# Patient Record
Sex: FEMALE | Race: WHITE | Employment: OTHER | URBAN - METROPOLITAN AREA
[De-identification: names, ages, dates, MRNs, and addresses within clinical notes are randomized per-mention and may not be internally consistent; named-entity substitution may affect disease eponyms.]

---

## 2020-09-24 ENCOUNTER — OFFICE VISIT (OUTPATIENT)
Dept: NEUROLOGY | Age: 75
End: 2020-09-24
Payer: MEDICARE

## 2020-09-24 ENCOUNTER — HOSPITAL ENCOUNTER (OUTPATIENT)
Dept: GENERAL RADIOLOGY | Age: 75
Discharge: HOME | End: 2020-09-24
Payer: MEDICARE

## 2020-09-24 VITALS
HEART RATE: 56 BPM | OXYGEN SATURATION: 98 % | TEMPERATURE: 98 F | WEIGHT: 123 LBS | SYSTOLIC BLOOD PRESSURE: 138 MMHG | DIASTOLIC BLOOD PRESSURE: 74 MMHG | RESPIRATION RATE: 12 BRPM

## 2020-09-24 DIAGNOSIS — G43.109 MIGRAINE WITH AURA, NOT INTRACTABLE, WITHOUT STATUS MIGRAINOSUS: ICD-10-CM

## 2020-09-24 DIAGNOSIS — M47.22 CERVICAL RADICULOPATHY DUE TO DEGENERATIVE JOINT DISEASE OF SPINE: ICD-10-CM

## 2020-09-24 DIAGNOSIS — G44.86 CERVICOGENIC HEADACHE: Primary | ICD-10-CM

## 2020-09-24 DIAGNOSIS — M48.061 DEGENERATIVE LUMBAR SPINAL STENOSIS: ICD-10-CM

## 2020-09-24 DIAGNOSIS — G44.86 CERVICOGENIC HEADACHE: ICD-10-CM

## 2020-09-24 PROCEDURE — 72052 X-RAY EXAM NECK SPINE 6/>VWS: CPT

## 2020-09-24 PROCEDURE — 1090F PRES/ABSN URINE INCON ASSESS: CPT | Performed by: PSYCHIATRY & NEUROLOGY

## 2020-09-24 PROCEDURE — G8400 PT W/DXA NO RESULTS DOC: HCPCS | Performed by: PSYCHIATRY & NEUROLOGY

## 2020-09-24 PROCEDURE — 1101F PT FALLS ASSESS-DOCD LE1/YR: CPT | Performed by: PSYCHIATRY & NEUROLOGY

## 2020-09-24 PROCEDURE — G8427 DOCREV CUR MEDS BY ELIG CLIN: HCPCS | Performed by: PSYCHIATRY & NEUROLOGY

## 2020-09-24 PROCEDURE — G8536 NO DOC ELDER MAL SCRN: HCPCS | Performed by: PSYCHIATRY & NEUROLOGY

## 2020-09-24 PROCEDURE — G8510 SCR DEP NEG, NO PLAN REQD: HCPCS | Performed by: PSYCHIATRY & NEUROLOGY

## 2020-09-24 PROCEDURE — 99205 OFFICE O/P NEW HI 60 MIN: CPT | Performed by: PSYCHIATRY & NEUROLOGY

## 2020-09-24 PROCEDURE — G8421 BMI NOT CALCULATED: HCPCS | Performed by: PSYCHIATRY & NEUROLOGY

## 2020-09-24 PROCEDURE — 3017F COLORECTAL CA SCREEN DOC REV: CPT | Performed by: PSYCHIATRY & NEUROLOGY

## 2020-09-24 RX ORDER — ACETAMINOPHEN 160 MG/5ML
SUSPENSION, ORAL (FINAL DOSE FORM) ORAL DAILY
COMMUNITY
End: 2021-04-05

## 2020-09-24 RX ORDER — MULTIVIT WITH MINERALS/HERBS
1 TABLET ORAL DAILY
COMMUNITY
End: 2021-04-05

## 2020-09-24 RX ORDER — LANOLIN ALCOHOL/MO/W.PET/CERES
3000 CREAM (GRAM) TOPICAL DAILY
COMMUNITY
End: 2021-04-05

## 2020-09-24 RX ORDER — TIZANIDINE 2 MG/1
2 TABLET ORAL
Qty: 100 TAB | Refills: 5 | Status: SHIPPED | OUTPATIENT
Start: 2020-09-24 | End: 2021-04-05 | Stop reason: SINTOL

## 2020-09-24 RX ORDER — ATORVASTATIN CALCIUM 10 MG/1
TABLET, FILM COATED ORAL EVERY EVENING
COMMUNITY
Start: 2020-06-30

## 2020-09-24 RX ORDER — BUTALBITAL, ACETAMINOPHEN AND CAFFEINE 50; 325; 40 MG/1; MG/1; MG/1
2 TABLET ORAL
Qty: 50 TAB | Refills: 5 | Status: SHIPPED | OUTPATIENT
Start: 2020-09-24 | End: 2021-04-05 | Stop reason: SDUPTHER

## 2020-09-24 RX ORDER — CHOLECALCIFEROL (VITAMIN D3) 125 MCG
CAPSULE ORAL
COMMUNITY
End: 2021-04-05

## 2020-09-24 RX ORDER — DICLOFENAC SODIUM 75 MG/1
75 TABLET, DELAYED RELEASE ORAL 2 TIMES DAILY
COMMUNITY
Start: 2020-07-26

## 2020-09-24 NOTE — LETTER
9/24/20 Patient: Jazmin Muñoz YOB: 1945 Date of Visit: 9/24/2020 Chava Young MD 
[facility] [address] [facility] [address] [place] [address] VIA Facsimile: [phone] Dear Chava Young MD, Thank you for referring Ms. Angelika Sal to [address] [place] [address] for evaluation. My notes for this consultation are attached. Consult REFERRED BY: 
Gerardo Guevara MD 
 
CHIEF COMPLAINT: Severe headaches in the right posterior head regions Subjective:  
 
Jazmin Muñoz is a 77 y.o. right-handed  female who states that in 2017 she fell and hit her head, and ever since then has had bad headaches seem to begin at the base of her skull on the right side and radiate up into the eyebrow area, and she is been diagnosed with right occipital neuralgia, and receiving intermittent injections in that area to try to control her pain. She had an MRI scan of the cervical spine done in 2017 that showed mild degenerative disease and disc disease and neuroforaminal disease of moderate severity throughout the cervical spine but no significant cord compression. She also had an MRI done of the lumbar spine within the last year that shows severe spinal stenosis at L3-4 and L4-5 which causes her pain and discomfort and she has to get injections in her lumbar spine intermittently from her pain management physician Dr. Siva Lizama. Patient also gets chiropractic treatment which also aligned the lower lumbar spine and the cervical spine. She is concerned because she is getting more severe headaches sometimes in the right occipital region that are associated with nausea and vomiting and sensitivity to sound and light and nausea.   Patient does not have any focal weakness or sensory loss with the headaches, but because the headaches are getting worse she is concerned about intracranial disease or vascular malformation and she is never had any imaging of the brain done. Patient tried gabapentin capsule, that dose unknown and found to be drowsy or sedating and could not lessen the dose because it was a capsule not a tablet. She tried Zanaflex capsule also and felt that too drowsy and could not break that in half because it was a capsule also. She has a lot of neck muscle tightness and spasm. She never really had headaches before this. She has had no fever or meningismus, no recent trauma, does not clench her teeth or grind her teeth, and knows of no other cause for the headaches. Past Medical History:  
Diagnosis Date  Degenerative cervical spinal stenosis  Degenerative lumbar spinal stenosis  Hyperlipemia  Hypertension  Occipital neuralgia of right side  Thyroid disease Past Surgical History:  
Procedure Laterality Date  HX APPENDECTOMY  HX CHOLECYSTECTOMY Family History Problem Relation Age of Onset  Dementia Mother  Breast Cancer Mother [employer] Other Mother POLIO AND COLITIS  COPD Father  Migraines Child  Allergic Rhinitis Child  Sleep Apnea Child Social History Tobacco Use  Smoking status: Former Smoker Last attempt to quit: 1970 Years since quittin.1  Smokeless tobacco: Never Used Substance Use Topics  Alcohol use: Yes Frequency: Monthly or less Current Outpatient Medications:  
  atorvastatin (LIPITOR) 10 mg tablet, , Disp: , Rfl:  
  diclofenac EC (VOLTAREN) 75 mg EC tablet, , Disp: , Rfl:  
  cholecalciferol, vitamin D3, (Vitamin D3) 50 mcg (2,000 unit) tab, Take  by mouth., Disp: , Rfl:  
  coenzyme Q-10 (Co Q-10) 200 mg capsule, Take  by mouth daily. , Disp: , Rfl:  
  cyanocobalamin 1,000 mcg tablet, Take 3,000 mcg by mouth daily. , Disp: , Rfl:  
  b complex vitamins tablet, Take 1 Tab by mouth daily. , Disp: , Rfl:  
   butalbital-acetaminophen-caffeine (FIORICET, ESGIC) -40 mg per tablet, Take 2 Tabs by mouth every eight (8) hours as needed for Headache., Disp: 50 Tab, Rfl: 5 
  tiZANidine (ZANAFLEX) 2 mg tablet, Take 1 Tab by mouth three (3) times daily as needed for Muscle Spasm(s). , Disp: 100 Tab, Rfl: 5 
  fexofenadine (ALLEGRA) 180 mg tablet, Take 180 mg by mouth daily. , Disp: , Rfl:  
  conjugated estrogens (PREMARIN) 0.625 mg tablet, Take  by mouth., Disp: , Rfl:  
  thyroid (ARMOUR) 90 mg tablet, Take 1 Tab by mouth daily. , Disp: 30 Tab, Rfl: 5 Allergies Allergen Reactions  Penicillamine Rash  Synthroid [Levothyroxine] Rash and Itching To breast area MRI Results (most recent): 
Results from [facility] Encounter encounter on 02/29/12 MRI SHOULDER LEFT WITHOUT CONTRAST Narrative **Final Report** 
  
 
ICD Codes / Adm. Diagnosis: 840.4   / ROTATOR CUFF (CAPSULE) SPRAIN Examination:  MR SHOULDER WO CON LT  - [id number] - Feb 29 2012  2:46PM 
Accession No:  [id number] Reason:  srct shoulder REPORT: 
INDICATION: Left shoulder pain Exam: Unenhanced MRI of the left shoulder Comparisons: None Sequences: Oblique coronal T1, oblique coronal proton density and T2 with  
fat saturation, axial proton density with fat saturation, oblique sagittal  
T2 fat saturation. Findings: There is mild to moderate degeneration of the acromioclavicular  
joint with subtle subacromial spurring. There is fluid in the subacromial  
and subdeltoid bursa with a moderate joint effusion. There is a large full  
thickness tear of the rotator cuff with 2.5 cm of retraction. There is  
shallow partial thickness undersurface tearing of the anterior  
infraspinatus. Subscapularis and tear minor are intact. The biceps long head  
tendon is located within the bicipital groove and is normal in appearance. There is no evidence for labral tear. No muscle atrophy.  
   
 
IMPRESSION: 
 1. Large full thickness tear the supraspinatus with 2.5 cm of retraction 2. Shallow partial thickness undersurface tearing of the infraspinatus Signing/Reading Doctor: Nikole LIU ([id number]) ApprovedHaydee Door ([id number])  02/29/2012 [facility] MRI SHOULDER LEFT WITHOUT CONTRAST Narrative **Final Report** 
  
 
ICD Codes / Adm. Diagnosis: 840.4   / ROTATOR CUFF (CAPSULE) SPRAIN Examination:  MR SHOULDER WO CON LT  - [id number] - Feb 29 2012  2:46PM 
Accession No:  [id number] Reason:  srct shoulder REPORT: 
INDICATION: Left shoulder pain Exam: Unenhanced MRI of the left shoulder Comparisons: None Sequences: Oblique coronal T1, oblique coronal proton density and T2 with  
fat saturation, axial proton density with fat saturation, oblique sagittal  
T2 fat saturation. Findings: There is mild to moderate degeneration of the acromioclavicular  
joint with subtle subacromial spurring. There is fluid in the subacromial  
and subdeltoid bursa with a moderate joint effusion. There is a large full  
thickness tear of the rotator cuff with 2.5 cm of retraction. There is  
shallow partial thickness undersurface tearing of the anterior  
infraspinatus. Subscapularis and tear minor are intact. The biceps long head  
tendon is located within the bicipital groove and is normal in appearance. There is no evidence for labral tear. No muscle atrophy. IMPRESSION: 
1. Large full thickness tear the supraspinatus with 2.5 cm of retraction 2. Shallow partial thickness undersurface tearing of the infraspinatus Signing/Reading Doctor: Nikole LIU ([id number]) ApprovedHaydee Door ([id number])  02/29/2012 Review of Systems: A comprehensive review of systems was negative except for: Constitutional: positive for fatigue and malaise Eyes: positive for visual disturbance Musculoskeletal: positive for myalgias, arthralgias, stiff joints and neck pain Neurological: positive for headaches Behvioral/Psych: positive for anxiety and depression Vitals:  
 09/24/20 1039 BP: 138/74 Pulse: (!) 56 Temp: 98 °F (36.7 °C) SpO2: 98% Weight: 123 lb (55.8 kg) Objective: I 
 
 
NEUROLOGICAL EXAM: 
 
Appearance: The patient is well developed, well nourished, provides a coherent history and is in no acute distress. Mental Status: Oriented to time, place and person, and the president, cognitive function is normal and speech is fluent and no aphasia or dysarthria. Mood and affect appropriate, but appears a little anxious and depressed. Cranial Nerves:   Intact visual fields. Fundi are benign, disc are flat, no lesions seen on funduscopy. VIANNEY, EOM's full, no nystagmus, no ptosis. Facial sensation is normal. Corneal reflexes are not tested. Facial movement is symmetric. Hearing is normal bilaterally. Palate is midline with normal sternocleidomastoid and trapezius muscles are normal. Tongue is midline. Neck without meningismus or bruits, but patient has mild pain on range of motion, particularly on rotation to the left and hyperextension and very tender at the occipital area at the craniocervical junction consistent with her occipital neuralgia. Temporal arteries are not tender or enlarged TMJ areas are not tender on palpation Motor:  5/5 strength in upper and lower proximal and distal muscles. Normal bulk and tone. No fasciculations. Rapid alternating movement is symmetric and intact bilaterally Reflexes:   Deep tendon reflexes 2+/4 and symmetrical. 
No babinski or clonus present Sensory:   Normal to touch, pinprick and vibration and temperature. DSS is intact Gait:  Normal gait for patient's age. Tremor:   No tremor noted.   
Cerebellar:  No abnormal cerebellar signs present on Romberg and tandem testing and finger-nose-finger exam.  
Neurovascular:  Normal heart sounds and regular rhythm, peripheral pulses decreased, and no carotid bruits. Assessment: ICD-10-CM ICD-9-CM 1. Cervicogenic headache  R51 784.0 MRI BRAIN W WO CONT  
   CBC WITH AUTOMATED DIFF JR COMPREHENSIVE PLUS PANEL  
   SED RATE (ESR) XR SPINE CERV W OBL/FLEX/EXT MIN 6 V COMP  
   butalbital-acetaminophen-caffeine (FIORICET, ESGIC) -40 mg per tablet  
   tiZANidine (ZANAFLEX) 2 mg tablet 2. Migraine with aura, not intractable, without status migrainosus  G43.109 346.00 MRI BRAIN W WO CONT  
   CBC WITH AUTOMATED DIFF JR COMPREHENSIVE PLUS PANEL  
   SED RATE (ESR) XR SPINE CERV W OBL/FLEX/EXT MIN 6 V COMP  
   butalbital-acetaminophen-caffeine (FIORICET, ESGIC) -40 mg per tablet  
   tiZANidine (ZANAFLEX) 2 mg tablet 3. Degenerative lumbar spinal stenosis  M48.061 724.02 MRI BRAIN W WO CONT  
   CBC WITH AUTOMATED DIFF JR COMPREHENSIVE PLUS PANEL  
   SED RATE (ESR) XR SPINE CERV W OBL/FLEX/EXT MIN 6 V COMP  
   butalbital-acetaminophen-caffeine (FIORICET, ESGIC) -40 mg per tablet  
   tiZANidine (ZANAFLEX) 2 mg tablet 4. Cervical radiculopathy due to degenerative joint disease of spine  M47.22 721.0 MRI BRAIN W WO CONT  
   CBC WITH AUTOMATED DIFF JR COMPREHENSIVE PLUS PANEL  
   SED RATE (ESR) XR SPINE CERV W OBL/FLEX/EXT MIN 6 V COMP  
   butalbital-acetaminophen-caffeine (FIORICET, ESGIC) -40 mg per tablet  
   tiZANidine (ZANAFLEX) 2 mg tablet Active Problems: * No active hospital problems. * 
 
 
Plan:  
 
Patient with complicated history of degenerative disease of the cervical spine and cervicogenic headaches typical of occipital neuralgia, and to help her with the headaches, we will give her Zanaflex tablets which she can cut in half or quarters and use as needed to keep the muscle spasm down to see if it helps her headaches. For the headaches himself, she found that Fioricet helped, so we gave her a new prescription for the Fioricet and will use that as needed and try to keep her off narcotics. She is to continue working with her pain management physician Dr. Hussein Hagen, and get periodic injections to help control her pain. She will also continue working with her chiropractor. Because she has never had any imaging of the brain we will get an MRI of the brain with and without contrast to rule out any structural or vascular disease. We will also check a cervical spine x-ray with flexion-extension views to rule out any instability in the spine. Also check a sed rate and JR to rule out any type of arteritis. Extremely difficult case, we could probably try liquid gabapentin which she could try a low dose and gradually work up on if this fails. She may be a candidate for Botox for her headaches, which may be beneficial for the muscle spasm and her tension vascular and transformed migraines. Do not think CGRP inhibitors will help here. 1 hour for the patient going over all her history, reviewing her previous MRI scans and x-rays, all her lab test, from her portal, and discussing her diagnosis prognosis treatment options and testing needed medications and side effects for her headaches. Follow-up in 3 months time or earlier as needed. Signed By: Demian Cobos MD   
 September 24, 2020 CC: Netta Sosa MD 
FAX: [phone] This note will not be viewable in [address] If you have questions, please do not hesitate to call me. I look forward to following your patient along with you. Sincerely, Demian Cobos MD

## 2020-09-25 NOTE — PROGRESS NOTES
Consult  REFERRED BY:  Suhail Cloud MD    CHIEF COMPLAINT: Severe headaches in the right posterior head regions      Subjective:     Eber Hudson is a 77 y.o. right-handed  female who states that in 2017 she fell and hit her head, and ever since then has had bad headaches seem to begin at the base of her skull on the right side and radiate up into the eyebrow area, and she is been diagnosed with right occipital neuralgia, and receiving intermittent injections in that area to try to control her pain. She had an MRI scan of the cervical spine done in 2017 that showed mild degenerative disease and disc disease and neuroforaminal disease of moderate severity throughout the cervical spine but no significant cord compression. She also had an MRI done of the lumbar spine within the last year that shows severe spinal stenosis at L3-4 and L4-5 which causes her pain and discomfort and she has to get injections in her lumbar spine intermittently from her pain management physician Dr. Cristina York. Patient also gets chiropractic treatment which also aligned the lower lumbar spine and the cervical spine. She is concerned because she is getting more severe headaches sometimes in the right occipital region that are associated with nausea and vomiting and sensitivity to sound and light and nausea. Patient does not have any focal weakness or sensory loss with the headaches, but because the headaches are getting worse she is concerned about intracranial disease or vascular malformation and she is never had any imaging of the brain done. Patient tried gabapentin capsule, that dose unknown and found to be drowsy or sedating and could not lessen the dose because it was a capsule not a tablet. She tried Zanaflex capsule also and felt that too drowsy and could not break that in half because it was a capsule also. She has a lot of neck muscle tightness and spasm. She never really had headaches before this.   She has had no fever or meningismus, no recent trauma, does not clench her teeth or grind her teeth, and knows of no other cause for the headaches. Past Medical History:   Diagnosis Date    Degenerative cervical spinal stenosis     Degenerative lumbar spinal stenosis     Hyperlipemia     Hypertension     Occipital neuralgia of right side     Thyroid disease       Past Surgical History:   Procedure Laterality Date    HX APPENDECTOMY      HX CHOLECYSTECTOMY       Family History   Problem Relation Age of Onset    Dementia Mother     Breast Cancer Mother     Other Mother         POLIO AND COLITIS    COPD Father     Migraines Child     Allergic Rhinitis Child     Sleep Apnea Child       Social History     Tobacco Use    Smoking status: Former Smoker     Last attempt to quit: 1970     Years since quittin.1    Smokeless tobacco: Never Used   Substance Use Topics    Alcohol use: Yes     Frequency: Monthly or less         Current Outpatient Medications:     atorvastatin (LIPITOR) 10 mg tablet, , Disp: , Rfl:     diclofenac EC (VOLTAREN) 75 mg EC tablet, , Disp: , Rfl:     cholecalciferol, vitamin D3, (Vitamin D3) 50 mcg (2,000 unit) tab, Take  by mouth., Disp: , Rfl:     coenzyme Q-10 (Co Q-10) 200 mg capsule, Take  by mouth daily. , Disp: , Rfl:     cyanocobalamin 1,000 mcg tablet, Take 3,000 mcg by mouth daily. , Disp: , Rfl:     b complex vitamins tablet, Take 1 Tab by mouth daily. , Disp: , Rfl:     butalbital-acetaminophen-caffeine (FIORICET, ESGIC) -40 mg per tablet, Take 2 Tabs by mouth every eight (8) hours as needed for Headache., Disp: 50 Tab, Rfl: 5    tiZANidine (ZANAFLEX) 2 mg tablet, Take 1 Tab by mouth three (3) times daily as needed for Muscle Spasm(s). , Disp: 100 Tab, Rfl: 5    fexofenadine (ALLEGRA) 180 mg tablet, Take 180 mg by mouth daily. , Disp: , Rfl:     conjugated estrogens (PREMARIN) 0.625 mg tablet, Take  by mouth., Disp: , Rfl:     thyroid (ARMOUR) 90 mg tablet, Take 1 Tab by mouth daily. , Disp: 30 Tab, Rfl: 5        Allergies   Allergen Reactions    Penicillamine Rash    Synthroid [Levothyroxine] Rash and Itching     To breast area      MRI Results (most recent):  Results from Hospital Encounter encounter on 02/29/12   MRI SHOULDER LEFT WITHOUT CONTRAST    Narrative **Final Report**       ICD Codes / Adm. Diagnosis: 840.4   / ROTATOR CUFF (CAPSULE) SPRAIN    Examination:  MR SHOULDER WO CON LT  - [id number] - Feb 29 2012  2:46PM  Accession No:  [id number]  Reason:  srct shoulder      REPORT:  INDICATION: Left shoulder pain    Exam: Unenhanced MRI of the left shoulder     Comparisons: None    Sequences: Oblique coronal T1, oblique coronal proton density and T2 with   fat saturation, axial proton density with fat saturation, oblique sagittal   T2 fat saturation. Findings: There is mild to moderate degeneration of the acromioclavicular   joint with subtle subacromial spurring. There is fluid in the subacromial   and subdeltoid bursa with a moderate joint effusion. There is a large full   thickness tear of the rotator cuff with 2.5 cm of retraction. There is   shallow partial thickness undersurface tearing of the anterior   infraspinatus. Subscapularis and tear minor are intact. The biceps long head   tendon is located within the bicipital groove and is normal in appearance. There is no evidence for labral tear. No muscle atrophy. IMPRESSION:  1. Large full thickness tear the supraspinatus with 2.5 cm of retraction  2. Shallow partial thickness undersurface tearing of the infraspinatus            Signing/Reading Doctor: Saima Hammond ([id number])    Approved: Saima Hammond ([id number])  02/29/2012                                      Results from Hospital Encounter encounter on 02/29/12   MRI SHOULDER LEFT WITHOUT CONTRAST    Narrative **Final Report**       ICD Codes / Adm. Diagnosis: 840.4   / ROTATOR CUFF (CAPSULE) SPRAIN    Examination:  MR SHOULDER  CON LT  - [id number] - Feb 29 2012  2:46PM  Accession No:  [id number]  Reason:  srct shoulder      REPORT:  INDICATION: Left shoulder pain    Exam: Unenhanced MRI of the left shoulder     Comparisons: None    Sequences: Oblique coronal T1, oblique coronal proton density and T2 with   fat saturation, axial proton density with fat saturation, oblique sagittal   T2 fat saturation. Findings: There is mild to moderate degeneration of the acromioclavicular   joint with subtle subacromial spurring. There is fluid in the subacromial   and subdeltoid bursa with a moderate joint effusion. There is a large full   thickness tear of the rotator cuff with 2.5 cm of retraction. There is   shallow partial thickness undersurface tearing of the anterior   infraspinatus. Subscapularis and tear minor are intact. The biceps long head   tendon is located within the bicipital groove and is normal in appearance. There is no evidence for labral tear. No muscle atrophy. IMPRESSION:  1. Large full thickness tear the supraspinatus with 2.5 cm of retraction  2. Shallow partial thickness undersurface tearing of the infraspinatus            Signing/Reading Doctor: Wing LIU ([id number])    Approved: Ruth Anderson ([id number])  02/29/2012                                    Review of Systems:  A comprehensive review of systems was negative except for: Constitutional: positive for fatigue and malaise  Eyes: positive for visual disturbance  Musculoskeletal: positive for myalgias, arthralgias, stiff joints and neck pain  Neurological: positive for headaches  Behvioral/Psych: positive for anxiety and depression   Vitals:    09/24/20 1039   BP: 138/74   Pulse: (!) 56   Temp: 98 °F (36.7 °C)   SpO2: 98%   Weight: 123 lb (55.8 kg)     Objective:     I      NEUROLOGICAL EXAM:    Appearance: The patient is well developed, well nourished, provides a coherent history and is in no acute distress.    Mental Status: Oriented to time, place and person, and the president, cognitive function is normal and speech is fluent and no aphasia or dysarthria. Mood and affect appropriate, but appears a little anxious and depressed. Cranial Nerves:   Intact visual fields. Fundi are benign, disc are flat, no lesions seen on funduscopy. VIANNEY, EOM's full, no nystagmus, no ptosis. Facial sensation is normal. Corneal reflexes are not tested. Facial movement is symmetric. Hearing is normal bilaterally. Palate is midline with normal sternocleidomastoid and trapezius muscles are normal. Tongue is midline. Neck without meningismus or bruits, but patient has mild pain on range of motion, particularly on rotation to the left and hyperextension and very tender at the occipital area at the craniocervical junction consistent with her occipital neuralgia. Temporal arteries are not tender or enlarged  TMJ areas are not tender on palpation   Motor:  5/5 strength in upper and lower proximal and distal muscles. Normal bulk and tone. No fasciculations. Rapid alternating movement is symmetric and intact bilaterally   Reflexes:   Deep tendon reflexes 2+/4 and symmetrical.  No babinski or clonus present   Sensory:   Normal to touch, pinprick and vibration and temperature. DSS is intact   Gait:  Normal gait for patient's age. Tremor:   No tremor noted. Cerebellar:  No abnormal cerebellar signs present on Romberg and tandem testing and finger-nose-finger exam.   Neurovascular:  Normal heart sounds and regular rhythm, peripheral pulses decreased, and no carotid bruits. Assessment:       ICD-10-CM ICD-9-CM    1. Cervicogenic headache  R51 784.0 MRI BRAIN W WO CONT      CBC WITH AUTOMATED DIFF      JR COMPREHENSIVE PLUS PANEL      SED RATE (ESR)      XR SPINE CERV W OBL/FLEX/EXT MIN 6 V COMP      butalbital-acetaminophen-caffeine (FIORICET, ESGIC) -40 mg per tablet      tiZANidine (ZANAFLEX) 2 mg tablet   2.  Migraine with aura, not intractable, without status migrainosus  G43.109 346.00 MRI BRAIN W WO CONT      CBC WITH AUTOMATED DIFF      JR COMPREHENSIVE PLUS PANEL      SED RATE (ESR)      XR SPINE CERV W OBL/FLEX/EXT MIN 6 V COMP      butalbital-acetaminophen-caffeine (FIORICET, ESGIC) -40 mg per tablet      tiZANidine (ZANAFLEX) 2 mg tablet   3. Degenerative lumbar spinal stenosis  M48.061 724.02 MRI BRAIN W WO CONT      CBC WITH AUTOMATED DIFF      JR COMPREHENSIVE PLUS PANEL      SED RATE (ESR)      XR SPINE CERV W OBL/FLEX/EXT MIN 6 V COMP      butalbital-acetaminophen-caffeine (FIORICET, ESGIC) -40 mg per tablet      tiZANidine (ZANAFLEX) 2 mg tablet   4. Cervical radiculopathy due to degenerative joint disease of spine  M47.22 721.0 MRI BRAIN W WO CONT      CBC WITH AUTOMATED DIFF      JR COMPREHENSIVE PLUS PANEL      SED RATE (ESR)      XR SPINE CERV W OBL/FLEX/EXT MIN 6 V COMP      butalbital-acetaminophen-caffeine (FIORICET, ESGIC) -40 mg per tablet      tiZANidine (ZANAFLEX) 2 mg tablet     Active Problems:    * No active hospital problems. *      Plan:     Patient with complicated history of degenerative disease of the cervical spine and cervicogenic headaches typical of occipital neuralgia, and to help her with the headaches, we will give her Zanaflex tablets which she can cut in half or quarters and use as needed to keep the muscle spasm down to see if it helps her headaches. For the headaches himself, she found that Fioricet helped, so we gave her a new prescription for the Fioricet and will use that as needed and try to keep her off narcotics. She is to continue working with her pain management physician Dr. Wiley Ledbetter, and get periodic injections to help control her pain. She will also continue working with her chiropractor. Because she has never had any imaging of the brain we will get an MRI of the brain with and without contrast to rule out any structural or vascular disease.   We will also check a cervical spine x-ray with flexion-extension views to rule out any instability in the spine. Also check a sed rate and JR to rule out any type of arteritis. Extremely difficult case, we could probably try liquid gabapentin which she could try a low dose and gradually work up on if this fails. She may be a candidate for Botox for her headaches, which may be beneficial for the muscle spasm and her tension vascular and transformed migraines. Do not think CGRP inhibitors will help here. 1 hour for the patient going over all her history, reviewing her previous MRI scans and x-rays, all her lab test, from her portal, and discussing her diagnosis prognosis treatment options and testing needed medications and side effects for her headaches. Follow-up in 3 months time or earlier as needed. Signed By: Pricilla Meza MD     September 24, 2020       CC: Anum Goodrich MD  FAX: [phone]    This note will not be viewable in [address]

## 2020-09-26 LAB
BASOPHILS # BLD AUTO: 0.1 X10E3/UL (ref 0–0.2)
BASOPHILS NFR BLD AUTO: 1 %
CENTROMERE B AB SER-ACNC: <0.2 AI (ref 0–0.9)
CHROMATIN AB SERPL-ACNC: <0.2 AI (ref 0–0.9)
DSDNA AB SER-ACNC: 1 IU/ML (ref 0–9)
ENA JO1 AB SER-ACNC: <0.2 AI (ref 0–0.9)
ENA RNP AB SER-ACNC: <0.2 AI (ref 0–0.9)
ENA SCL70 AB SER-ACNC: <0.2 AI (ref 0–0.9)
ENA SM AB SER-ACNC: <0.2 AI (ref 0–0.9)
ENA SM+RNP AB SER-ACNC: <0.2 AI (ref 0–0.9)
ENA SS-A AB SER-ACNC: <0.2 AI (ref 0–0.9)
ENA SS-B AB SER-ACNC: 0.3 AI (ref 0–0.9)
EOSINOPHIL # BLD AUTO: 0.1 X10E3/UL (ref 0–0.4)
EOSINOPHIL NFR BLD AUTO: 2 %
ERYTHROCYTE [DISTWIDTH] IN BLOOD BY AUTOMATED COUNT: 12 % (ref 11.7–15.4)
ERYTHROCYTE [SEDIMENTATION RATE] IN BLOOD BY WESTERGREN METHOD: 16 MM/HR (ref 0–40)
HCT VFR BLD AUTO: 38 % (ref 34–46.6)
HGB BLD-MCNC: 12.8 G/DL (ref 11.1–15.9)
IMM GRANULOCYTES # BLD AUTO: 0 X10E3/UL (ref 0–0.1)
IMM GRANULOCYTES NFR BLD AUTO: 0 %
LYMPHOCYTES # BLD AUTO: 2.2 X10E3/UL (ref 0.7–3.1)
LYMPHOCYTES NFR BLD AUTO: 32 %
MCH RBC QN AUTO: 31.6 PG (ref 26.6–33)
MCHC RBC AUTO-ENTMCNC: 33.7 G/DL (ref 31.5–35.7)
MCV RBC AUTO: 94 FL (ref 79–97)
MONOCYTES # BLD AUTO: 0.7 X10E3/UL (ref 0.1–0.9)
MONOCYTES NFR BLD AUTO: 10 %
NEUTROPHILS # BLD AUTO: 3.9 X10E3/UL (ref 1.4–7)
NEUTROPHILS NFR BLD AUTO: 55 %
PLATELET # BLD AUTO: 231 X10E3/UL (ref 150–450)
RBC # BLD AUTO: 4.05 X10E6/UL (ref 3.77–5.28)
RIBOSOMAL P AB SER-ACNC: <0.2 AI (ref 0–0.9)
SEE BELOW:, 164879: NORMAL
WBC # BLD AUTO: 7 X10E3/UL (ref 3.4–10.8)

## 2020-10-08 ENCOUNTER — TELEPHONE (OUTPATIENT)
Dept: NEUROLOGY | Age: 75
End: 2020-10-08

## 2020-10-08 NOTE — TELEPHONE ENCOUNTER
Generic Fioricet PA sent to [facility]  CaseId:[id number];Status:Approved; Review Type:Prior Auth; Coverage Start Date:09/08/2020; Coverage End Date:10/08/2021;    Faxed to [facility] in [place].

## 2020-10-09 ENCOUNTER — HOSPITAL ENCOUNTER (OUTPATIENT)
Dept: MRI IMAGING | Age: 75
Discharge: HOME | End: 2020-10-09
Attending: PSYCHIATRY & NEUROLOGY
Payer: MEDICARE

## 2020-10-09 DIAGNOSIS — M48.061 DEGENERATIVE LUMBAR SPINAL STENOSIS: ICD-10-CM

## 2020-10-09 DIAGNOSIS — G43.109 MIGRAINE WITH AURA, NOT INTRACTABLE, WITHOUT STATUS MIGRAINOSUS: ICD-10-CM

## 2020-10-09 DIAGNOSIS — M47.22 CERVICAL RADICULOPATHY DUE TO DEGENERATIVE JOINT DISEASE OF SPINE: ICD-10-CM

## 2020-10-09 DIAGNOSIS — G44.86 CERVICOGENIC HEADACHE: ICD-10-CM

## 2020-10-09 PROCEDURE — 70551 MRI BRAIN STEM W/O DYE: CPT

## 2020-11-10 ENCOUNTER — OFFICE VISIT (OUTPATIENT)
Dept: NEUROLOGY | Age: 75
End: 2020-11-10
Payer: MEDICARE

## 2020-11-10 VITALS
DIASTOLIC BLOOD PRESSURE: 59 MMHG | HEIGHT: 65 IN | OXYGEN SATURATION: 98 % | TEMPERATURE: 97.2 F | WEIGHT: 112 LBS | SYSTOLIC BLOOD PRESSURE: 146 MMHG | HEART RATE: 64 BPM | BODY MASS INDEX: 18.66 KG/M2

## 2020-11-10 DIAGNOSIS — M47.22 CERVICAL RADICULOPATHY DUE TO DEGENERATIVE JOINT DISEASE OF SPINE: ICD-10-CM

## 2020-11-10 DIAGNOSIS — G44.86 CERVICOGENIC HEADACHE: ICD-10-CM

## 2020-11-10 DIAGNOSIS — F32.1 MODERATE MAJOR DEPRESSION, SINGLE EPISODE (HCC): ICD-10-CM

## 2020-11-10 DIAGNOSIS — Z13.31 POSITIVE DEPRESSION SCREENING: ICD-10-CM

## 2020-11-10 DIAGNOSIS — F34.1 DYSTHYMIA: Primary | ICD-10-CM

## 2020-11-10 DIAGNOSIS — M54.81 OCCIPITAL NEURALGIA OF RIGHT SIDE: ICD-10-CM

## 2020-11-10 PROCEDURE — 1101F PT FALLS ASSESS-DOCD LE1/YR: CPT | Performed by: PSYCHIATRY & NEUROLOGY

## 2020-11-10 PROCEDURE — 99215 OFFICE O/P EST HI 40 MIN: CPT | Performed by: PSYCHIATRY & NEUROLOGY

## 2020-11-10 PROCEDURE — 3017F COLORECTAL CA SCREEN DOC REV: CPT | Performed by: PSYCHIATRY & NEUROLOGY

## 2020-11-10 PROCEDURE — G8420 CALC BMI NORM PARAMETERS: HCPCS | Performed by: PSYCHIATRY & NEUROLOGY

## 2020-11-10 PROCEDURE — 1090F PRES/ABSN URINE INCON ASSESS: CPT | Performed by: PSYCHIATRY & NEUROLOGY

## 2020-11-10 PROCEDURE — G8427 DOCREV CUR MEDS BY ELIG CLIN: HCPCS | Performed by: PSYCHIATRY & NEUROLOGY

## 2020-11-10 PROCEDURE — G8432 DEP SCR NOT DOC, RNG: HCPCS | Performed by: PSYCHIATRY & NEUROLOGY

## 2020-11-10 PROCEDURE — G8536 NO DOC ELDER MAL SCRN: HCPCS | Performed by: PSYCHIATRY & NEUROLOGY

## 2020-11-10 PROCEDURE — G8400 PT W/DXA NO RESULTS DOC: HCPCS | Performed by: PSYCHIATRY & NEUROLOGY

## 2020-11-10 RX ORDER — OMEPRAZOLE 20 MG/1
20 CAPSULE, DELAYED RELEASE ORAL DAILY
COMMUNITY
End: 2021-04-05

## 2020-11-10 RX ORDER — DULOXETIN HYDROCHLORIDE 20 MG/1
20 CAPSULE, DELAYED RELEASE ORAL DAILY
Qty: 30 CAP | Refills: 2 | Status: SHIPPED | OUTPATIENT
Start: 2020-11-10 | End: 2020-12-22 | Stop reason: SINTOL

## 2020-11-10 NOTE — PROGRESS NOTES
[address] UP IN OFFICE VISIT    Deena Bryant is a 77 y.o. female who presents today for the following:  Chief Complaint   Patient presents with    Headache           HPI  Historical Data  Patient is known to the practice and has been previously seen by Dr. Carmina Laboy    Neurologic diagnosis  Headaches and migraines   Onset: 2017 when she fell and hit her head   Location right side starting at the base of her skull radiating to her eyebrow   Associated symptoms: Nausea and vomiting along with light and sound sensitivity    Occipital neuralgia   Has received occipital nerve blocks   Hx gave months of relief    Now non-sustained    Cervical spinal degenerative disc disease with moderately severe foraminal stenosis but without cord compression    Degenerative lumbar spinal stenosis: Severe at L3-4 and L4-5    Patient is followed by pain management: Dr. Rufus Dukes    Interim Data:     Headaches and migraines  Using butalbital 2 tablets 4 times a day  Additionally she continues on Zanaflex 2 mg at bedtime but finds she cannot take it during the day  She has headache pain 24/7  Mostly on the right as stated above  She continues on all of her baseline medications  Additionally she is using lidocaine gel as well as Voltaren gel and CBD oil  She has trouble sleeping at night secondary to the pain  Her activities are significantly curtailed. She is a  and does dog agility training as well she has not been able to do any of her normal activities and she is very frustrated by it.     She does attest to being depressed but not suicidal or wanting to harm her self  She continues to be seen by Dr. Rufus Dukes pain management and is due for a cervical block in the next 2 weeks      Allergies   Allergen Reactions    Penicillamine Rash    Synthroid [Levothyroxine] Rash and Itching     To breast area       Current Outpatient Medications   Medication Sig    omeprazole (PRILOSEC) 20 mg capsule Take 20 mg by mouth daily.  DULoxetine (CYMBALTA) 20 mg capsule Take 1 Cap by mouth daily. Indications: chronic muscle or bone pain    atorvastatin (LIPITOR) 10 mg tablet     diclofenac EC (VOLTAREN) 75 mg EC tablet     cholecalciferol, vitamin D3, (Vitamin D3) 50 mcg (2,000 unit) tab Take  by mouth.  coenzyme Q-10 (Co Q-10) 200 mg capsule Take  by mouth daily.  cyanocobalamin 1,000 mcg tablet Take 3,000 mcg by mouth daily.  b complex vitamins tablet Take 1 Tab by mouth daily.  butalbital-acetaminophen-caffeine (FIORICET, ESGIC) -40 mg per tablet Take 2 Tabs by mouth every eight (8) hours as needed for Headache.  tiZANidine (ZANAFLEX) 2 mg tablet Take 1 Tab by mouth three (3) times daily as needed for Muscle Spasm(s).  fexofenadine (ALLEGRA) 180 mg tablet Take 180 mg by mouth daily.  conjugated estrogens (PREMARIN) 0.625 mg tablet Take  by mouth.  thyroid (ARMOUR) 90 mg tablet Take 1 Tab by mouth daily. No current facility-administered medications for this visit.         Past Medical History:   Diagnosis Date    Degenerative cervical spinal stenosis     Degenerative lumbar spinal stenosis     Hyperlipemia     Hypertension     Occipital neuralgia of right side     Thyroid disease        Past Surgical History:   Procedure Laterality Date    HX APPENDECTOMY      HX CHOLECYSTECTOMY         Family History   Problem Relation Age of Onset    Dementia Mother     Breast Cancer Mother     Other Mother         POLIO AND COLITIS    COPD Father     Migraines Child     Allergic Rhinitis Child     Sleep Apnea Child        Social History     Socioeconomic History    Marital status:      Spouse name: Not on file    Number of children: Not on file    Years of education: Not on file    Highest education level: Not on file   Tobacco Use    Smoking status: Former Smoker     Last attempt to quit: 1970     Years since quittin.3    Smokeless tobacco: Never Used   Substance and Sexual Activity    Alcohol use: Yes     Frequency: Monthly or less    Drug use: Never         ROS  Other than what is stated above under HPI there is no new or changing issues related to the following:  Constitutional: No recent weight change, fever,fatigue, sleep difficulties, or loss of appetite. ENT/Mouth:  No hearing loss, ringing in the ears, chronic sinus problem, nose bleeds sore throat, voice change, hoarseness, swollen glands in neck, or difficulties with chewing and swallowing. Cardiovascular:  No chest pain/angina pectoris, palpitations,swelling of feet/ankles/hands, or calf pain while walking. Respiratory: No chronic or frequent coughs, spitting up blood, shortness of breath, asthma, or wheezing. Gastrointestinal: No abdominal pain, heartburn, nausea, vomiting, constipation, frequent diarrhea, rectal bleeding, or blood in stool. Genitourinary: No frequent urination, burning or painful urination, blood in urine, incontinence or dribbling. Musculoskeletal:   No joint pain, stiffness/swelling, weakness of muscles, muscle pain/cramp, or back pain. Integument:   No rash/itching, change in skin color, change in hair/nails, or change in color/size of moles. Neurological:  No dizziness/vertigo, loss of consciousness, numbness/tingling sensation, tremors, weakness in limbs, difficulty with balance, frequent or recurring headaches, memory loss or confusion. Psychiatric:   No nervousness, depression, hallucinations, paranoia or suspiciousness. Endocrine: No excessive thirst or urination, heat or cold intolerance. Hematologic/Lymphatic: No bleeding/bruising tendency, phlebitis, or past transfusion.        EXAMINATION  Visit Vitals  BP (!) 146/59   Pulse 64   Temp 97.2 °F (36.2 °C)   Ht 5' 5\" (1.651 m)   Wt 112 lb (50.8 kg)   SpO2 98%   BMI 18.64 kg/m²            General appearance: Patient is well-developed and well-nourished in no apparent distress and well groomed. Psych/mental health:  Affect: Appropriate    PHQ  3 most recent PHQ Screens 11/10/2020   Little interest or pleasure in doing things Nearly every day   Feeling down, depressed, irritable, or hopeless Nearly every day   Total Score PHQ 2 6   Trouble falling or staying asleep, or sleeping too much Nearly every day   Feeling tired or having little energy Nearly every day   Poor appetite, weight loss, or overeating Nearly every day   Feeling bad about yourself - or that you are a failure or have let yourself or your family down Not at all   Trouble concentrating on things such as school, work, reading, or watching TV Several days   Moving or speaking so slowly that other people could have noticed; or the opposite being so fidgety that others notice Not at all   Thoughts of being better off dead, or hurting yourself in some way Not at all   PHQ 9 Score 16   How difficult have these problems made it for you to do your work, take care of your home and get along with others Extremely difficult       HEENT:   Normocephalic  With evidence of trauma: No  Full range of motion head neck: Yes  Tenderness to palpation of the head neck region: No      Cardiovascular:   Extremities warm to touch: Yes   Extremity swelling: No  Discoloration: No  Evidence of PVD: No    Respiratory:   Dyspnea on exertion: No   Normal effort on casual observation: No   Use of portable oxygen: No   Evidence of cyanosis: No     Musculoskeletal:   Evidence of significant bone deformities: No   Spinal curvature: No     Integumentary:    Obvious bruising: No   Lacerations or discoloration on casual observation: No       Neurological Examination:   Mental Status:        MMSE  No flowsheet data found. Formal testing was not completed    there was nothing concerning on general observation and discussion.    Alert oriented and appropriate to general conversation  Normal processing on general observation  Followed conversation and responded seemingly appropriate throughout the office visit  No word finding difficulties noted on casual observation  Able to follow directions without difficulty     Cranial Nerves:    PERRLA,   EOMs intact gaze is conjugate  No nystagmus is appreciated  No JEANNA  Facial motor and sensory intact bilaterally  Hearing intact to conversation  Voice with normal projection, no evidence of secretion pooling  Palate elevates symmetrically  Shoulder shrug intact bilaterally  No tongue deviation appreciated     Motor:   Normal tone and bulk  Fine dexterity intact bilaterally  No tremor appreciated on today's exam  No abnormal movements appreciated on today's exam    Muscle strength testing:  Right side  Upper extremities  Deltoid 5/5  Bicep 5/5  Tricep 5/5  Hand grasp 5/5    Lower extremity  Hip flexor 5/5  Extensor 5/5  Flexor 5/5  Plantar flexion 5/5  Dorsiflexion 5/5      Left side  Upper extremity  Deltoid 5/5  Bicep 5/5  Tricep 5/5  Hand grasp 5/5    Lower extremity  Hip flexor 5/5  Extensor 5/5  Flexor 5/5  Plantar flexion 5/5  Dorsiflexion 5/5    Sensation: Intact to light touch bilaterally    Coordination/Cerebellar:   Grossly intact    Gait: Ambulates independently    Fall risk assessment  Fall Risk Assessment, last 12 mths 11/10/2020   Able to walk? Yes   Fall in past 12 months? No       Reflexes: Symmetrical    ASSESSMENT AND PLAN    Patient is known to this practice. This is my first time seeing the patient. Chart and history reviewed in detail at today's office visit. Chronic headache and some migraines of multiple etiology very disruptive to her life with reactionary major depression  Restart the patient on Cymbalta 20 mg and titrate her upward.   Patient states she has a high sensitivity to medications and usually has reactions at normal doses which is why restarting at low dose    Discussed with the patient and her daughter today that the Cymbalta hopefully will have multifactorial purpose to include pain management, mood improvement, and sleep improvement. Continue to use Zanaflex 2 mg at bedtime and try half a tablet during the day if needed hopefully should be able to tolerate that during the day    Finally I want her to try to wean down off the butalbital she currently is taking approximately 8 tablets daily I would like her to wean down by half a tablet every several days as tolerated regarding pain    Depression  Starting on Cymbalta as discussed above    She is to continue with all of her other medications and specialist.        ICD-10-CM ICD-9-CM    1. Dysthymia  F34.1 300.4 DULoxetine (CYMBALTA) 20 mg capsule   2. Cervicogenic headache  R51.9 784.0 DULoxetine (CYMBALTA) 20 mg capsule   3. Moderate major depression, single episode (HCC)  F32.1 296.22    4. Positive depression screening  Z13.31 796.4    5. Cervical radiculopathy due to degenerative joint disease of spine  M47.22 721.0    6. Occipital neuralgia of right side  M54.81 723.8            Additional pertinent medical data reviewed at today's office visit:       Office Visit on 09/24/2020   Component Date Value    WBC 09/25/2020 7.0     RBC 09/25/2020 4.05     HGB 09/25/2020 12.8     HCT 09/25/2020 38.0     MCV 09/25/2020 94     MCH 09/25/2020 31.6     MCHC 09/25/2020 33.7     RDW 09/25/2020 12.0     PLATELET 05/35/5971 016     NEUTROPHILS 09/25/2020 55     Lymphocytes 09/25/2020 32     MONOCYTES 09/25/2020 10     EOSINOPHILS 09/25/2020 2     BASOPHILS 09/25/2020 1     ABS. NEUTROPHILS 09/25/2020 3.9     Abs Lymphocytes 09/25/2020 2.2     ABS. MONOCYTES 09/25/2020 0.7     ABS. EOSINOPHILS 09/25/2020 0.1     ABS. BASOPHILS 09/25/2020 0.1     IMMATURE GRANULOCYTES 09/25/2020 0     ABS. IMM.  GRANS. 09/25/2020 0.0     Anti-DNA (DS) Ab, QT 09/25/2020 1     RNP Abs 09/25/2020 <0.2     Leal Abs 09/25/2020 <0.2     Leal/RNP Abs 09/25/2020 <0.2     Scleroderma-70 Ab 09/25/2020 <0.2     Sjogren's Anti-SS-A 09/25/2020 <0.2     Sjogren's Anti-SS-B 09/25/2020 0.3     Antichromatin Ab 09/25/2020 <0.2     Anti Ribosomal P Ab 09/25/2020 <0.2     Anti-Dhara-1 09/25/2020 <0.2     Centromere B Ab 09/25/2020 <0.2     See below 09/25/2020 Comment     Sed rate (ESR) 09/25/2020 16        XR Results (maximum last 3): Results from Hospital Encounter encounter on 09/24/20   XR SPINE CERV W OBL/FLEX/EXT MIN 6 V COMP    Impression IMPRESSION:  Degenerative disc and joint disease as detailed above. CT Results (maximum last 3): No results found for this or any previous visit. MRI Results (maximum last 3): Results from [place] encounter on 10/09/20   MRI BRAIN WO CONT    Impression IMPRESSION:     1. Normal brain MRI. Results from [place] encounter on 02/29/12   MRI SHOULDER LEFT WITHOUT CONTRAST         Follow-up and Dispositions    · Return in about 4 weeks (around 12/8/2020) for In office appointment. Cm West, MS, ANP-BC, MSCN  Depression screen positive, PHQ 9 Score: 16, .

## 2020-11-10 NOTE — PATIENT INSTRUCTIONS
We are going to add in Cymbalta 20 mg start by taking 1 tablet every other day for approximately 2 weeks and then increase to 1 full tablet daily This will have hopefully multiple benefits to include pain management, mood management, and sleep Continue with the lidocaine gel as well as the Voltaren gel and CBD as appropriate Continue with your pain management doctor and I agree with getting the cervical block Continue with the Zanaflex 2 mg at bedtime and if needed try just taking a half a tablet during the day since a full tablet is too much for you during waking hours Try to wean the butalbital starting by 1/2 tablet every couple of days as tolerated to the lowest dose possible without pain acceleration We will see you back in a month were available to you sooner if needed

## 2020-12-22 ENCOUNTER — OFFICE VISIT (OUTPATIENT)
Dept: NEUROLOGY | Age: 75
End: 2020-12-22
Payer: MEDICARE

## 2020-12-22 VITALS
BODY MASS INDEX: 19.49 KG/M2 | SYSTOLIC BLOOD PRESSURE: 150 MMHG | RESPIRATION RATE: 18 BRPM | HEIGHT: 65 IN | HEART RATE: 64 BPM | WEIGHT: 117 LBS | OXYGEN SATURATION: 97 % | DIASTOLIC BLOOD PRESSURE: 84 MMHG | TEMPERATURE: 92.7 F

## 2020-12-22 DIAGNOSIS — G44.209 TENSION VASCULAR HEADACHE: ICD-10-CM

## 2020-12-22 DIAGNOSIS — M47.22 CERVICAL RADICULOPATHY DUE TO DEGENERATIVE JOINT DISEASE OF SPINE: ICD-10-CM

## 2020-12-22 DIAGNOSIS — G44.86 CERVICOGENIC HEADACHE: ICD-10-CM

## 2020-12-22 DIAGNOSIS — Z91.89 AT RISK FOR SIDE EFFECT OF MEDICATION: ICD-10-CM

## 2020-12-22 DIAGNOSIS — M54.81 OCCIPITAL NEURALGIA OF RIGHT SIDE: Primary | ICD-10-CM

## 2020-12-22 PROCEDURE — G8536 NO DOC ELDER MAL SCRN: HCPCS | Performed by: PSYCHIATRY & NEUROLOGY

## 2020-12-22 PROCEDURE — 1090F PRES/ABSN URINE INCON ASSESS: CPT | Performed by: PSYCHIATRY & NEUROLOGY

## 2020-12-22 PROCEDURE — G8432 DEP SCR NOT DOC, RNG: HCPCS | Performed by: PSYCHIATRY & NEUROLOGY

## 2020-12-22 PROCEDURE — G8427 DOCREV CUR MEDS BY ELIG CLIN: HCPCS | Performed by: PSYCHIATRY & NEUROLOGY

## 2020-12-22 PROCEDURE — G8400 PT W/DXA NO RESULTS DOC: HCPCS | Performed by: PSYCHIATRY & NEUROLOGY

## 2020-12-22 PROCEDURE — 1101F PT FALLS ASSESS-DOCD LE1/YR: CPT | Performed by: PSYCHIATRY & NEUROLOGY

## 2020-12-22 PROCEDURE — 3017F COLORECTAL CA SCREEN DOC REV: CPT | Performed by: PSYCHIATRY & NEUROLOGY

## 2020-12-22 PROCEDURE — 99215 OFFICE O/P EST HI 40 MIN: CPT | Performed by: PSYCHIATRY & NEUROLOGY

## 2020-12-22 PROCEDURE — G8420 CALC BMI NORM PARAMETERS: HCPCS | Performed by: PSYCHIATRY & NEUROLOGY

## 2020-12-22 RX ORDER — GABAPENTIN 100 MG/1
CAPSULE ORAL
Qty: 240 CAP | Refills: 3 | Status: SHIPPED | OUTPATIENT
Start: 2020-12-22 | End: 2021-07-09 | Stop reason: SDUPTHER

## 2020-12-22 NOTE — PROGRESS NOTES
[address] UP IN OFFICE VISIT    Rach Lew is a 77 y.o. female who presents today for the following:  Chief Complaint   Patient presents with    Neurologic Problem     Occipital neuralgia         HPI  Historical Data  Patient is known to the practice and has been previously seen by Dr. Giovanni Dorado    Neurologic diagnosis  Headaches and migraines   Onset: 2017 when she fell and hit her head   Location right side starting at the base of her skull radiating to her eyebrow   Associated symptoms: Nausea and vomiting along with light and sound sensitivity    Occipital neuralgia   Has received occipital nerve blocks   Hx gave months of relief    Now non-sustained    Cervical spinal degenerative disc disease with moderately severe foraminal stenosis but without cord compression    Degenerative lumbar spinal stenosis: Severe at L3-4 and L4-5    Patient is followed by pain management: Dr. Celeste Sun    Interim Data:     Headaches and migraines  Using butalbital 2 tablets 4 times a day  Additionally she continues on Zanaflex 2 mg at bedtime but finds she cannot take it during the day  She has headache pain 24/7  Mostly on the right as stated above  She continues on all of her baseline medications  Additionally she is using lidocaine gel as well as Voltaren gel and CBD oil  She has trouble sleeping at night secondary to the pain  Her activities are significantly curtailed. She is a  and does dog agility training as well she has not been able to do any of her normal activities and she is very frustrated by it. Office visit 11/10/2020 we started Cymbalta titrating to 20 mg this was started for pain management headaches and mood; due to patient's high sensitivity to medications a very slow induction titration was planned which is why she is only on 20 mg    OV 12/22/2020:could not take cymbalta; got Occipital Nerve block 6 weeks ago;  Fioricet decreased 1/2 tab TID  And zanaflex 1 tab Q8 hours; continues w ON pain all the time no matter what; overall did well up until about a week ago occipital nerve pain is worsening she does feel as though the epidural cervical blocks she got from pain management has helped with the neck and upper back pain but the headaches are starting to escalate again. She does feel as though she might benefit from occipital nerve blocks again and she has an appointment with pain management in the beginning in January    Other  She does attest to being depressed but not suicidal or wanting to harm her self  She continues to be seen by Dr. Vinayak Sherman pain management and is due for a cervical block in the next 2 weeks    She is received cervical epidural injections 11/24/2020 and lumbar epidural injections 12/1/2020      Allergies   Allergen Reactions    Penicillamine Rash    Synthroid [Levothyroxine] Rash and Itching     To breast area       Current Outpatient Medications   Medication Sig    gabapentin (NEURONTIN) 100 mg capsule 1 to 2 tablets up to 4 times a day as needed for headache/migraine rescue  Indications: chronic headaches    omeprazole (PRILOSEC) 20 mg capsule Take 20 mg by mouth daily.  atorvastatin (LIPITOR) 10 mg tablet     diclofenac EC (VOLTAREN) 75 mg EC tablet     cholecalciferol, vitamin D3, (Vitamin D3) 50 mcg (2,000 unit) tab Take  by mouth.  coenzyme Q-10 (Co Q-10) 200 mg capsule Take  by mouth daily.  cyanocobalamin 1,000 mcg tablet Take 3,000 mcg by mouth daily.  b complex vitamins tablet Take 1 Tab by mouth daily.  butalbital-acetaminophen-caffeine (FIORICET, ESGIC) -40 mg per tablet Take 2 Tabs by mouth every eight (8) hours as needed for Headache.  tiZANidine (ZANAFLEX) 2 mg tablet Take 1 Tab by mouth three (3) times daily as needed for Muscle Spasm(s).  fexofenadine (ALLEGRA) 180 mg tablet Take 180 mg by mouth daily.  conjugated estrogens (PREMARIN) 0.625 mg tablet Take  by mouth.     thyroid (ARMOUR) 90 mg tablet Take 1 Tab by mouth daily. No current facility-administered medications for this visit. Past Medical History:   Diagnosis Date    Degenerative cervical spinal stenosis     Degenerative lumbar spinal stenosis     Hyperlipemia     Hypertension     Occipital neuralgia of right side     Thyroid disease        Past Surgical History:   Procedure Laterality Date    HX APPENDECTOMY      HX CHOLECYSTECTOMY         Family History   Problem Relation Age of Onset    Dementia Mother     Breast Cancer Mother     Other Mother         POLIO AND COLITIS    COPD Father     Migraines Child     Allergic Rhinitis Child     Sleep Apnea Child        Social History     Socioeconomic History    Marital status:      Spouse name: Not on file    Number of children: Not on file    Years of education: Not on file    Highest education level: Not on file   Tobacco Use    Smoking status: Former Smoker     Quit date: 1970     Years since quittin.4    Smokeless tobacco: Never Used   Substance and Sexual Activity    Alcohol use: Yes     Frequency: Monthly or less    Drug use: Never         ROS  Other than what is stated above under HPI there is no new or changing issues related to the following:  Constitutional: No recent weight change, fever,fatigue, sleep difficulties, or loss of appetite. ENT/Mouth:  No hearing loss, ringing in the ears, chronic sinus problem, nose bleeds sore throat, voice change, hoarseness, swollen glands in neck, or difficulties with chewing and swallowing. Cardiovascular:  No chest pain/angina pectoris, palpitations,swelling of feet/ankles/hands, or calf pain while walking. Respiratory: No chronic or frequent coughs, spitting up blood, shortness of breath, asthma, or wheezing. Gastrointestinal: No abdominal pain, heartburn, nausea, vomiting, constipation, frequent diarrhea, rectal bleeding, or blood in stool.    Genitourinary: No frequent urination, burning or painful urination, blood in urine, incontinence or dribbling. Musculoskeletal:   No joint pain, stiffness/swelling, weakness of muscles, muscle pain/cramp, or back pain. Integument:   No rash/itching, change in skin color, change in hair/nails, or change in color/size of moles. Neurological:  No dizziness/vertigo, loss of consciousness, numbness/tingling sensation, tremors, weakness in limbs, difficulty with balance, frequent or recurring headaches, memory loss or confusion. Psychiatric:   No nervousness, depression, hallucinations, paranoia or suspiciousness. Endocrine: No excessive thirst or urination, heat or cold intolerance. Hematologic/Lymphatic: No bleeding/bruising tendency, phlebitis, or past transfusion. EXAMINATION  Visit Vitals  BP (!) 150/84 (BP 1 Location: Left arm, BP Patient Position: Sitting)   Pulse 64   Temp (!) 92.7 °F (33.7 °C) (Temporal)   Resp 18   Ht 5' 5\" (1.651 m)   Wt 117 lb (53.1 kg)   SpO2 97%   BMI 19.47 kg/m²            General appearance: Patient is well-developed and well-nourished in no apparent distress and well groomed.     Psych/mental health:  Affect: Appropriate    PHQ  3 most recent PHQ Screens 11/10/2020   Little interest or pleasure in doing things Nearly every day   Feeling down, depressed, irritable, or hopeless Nearly every day   Total Score PHQ 2 6   Trouble falling or staying asleep, or sleeping too much Nearly every day   Feeling tired or having little energy Nearly every day   Poor appetite, weight loss, or overeating Nearly every day   Feeling bad about yourself - or that you are a failure or have let yourself or your family down Not at all   Trouble concentrating on things such as school, work, reading, or watching TV Several days   Moving or speaking so slowly that other people could have noticed; or the opposite being so fidgety that others notice Not at all   Thoughts of being better off dead, or hurting yourself in some way Not at all PHQ 9 Score 16   How difficult have these problems made it for you to do your work, take care of your home and get along with others Extremely difficult       HEENT:   Normocephalic  With evidence of trauma: No  Full range of motion head neck: Yes  Tenderness to palpation of the head neck region: Yes especially on the right side over the occipital neurovascular bundles and of the sternocleidomastoid on the right side      Cardiovascular:   Extremities warm to touch: Yes   Extremity swelling: No  Discoloration: No  Evidence of PVD: No    Respiratory:   Dyspnea on exertion: No   Normal effort on casual observation: Yes  Use of portable oxygen: No   Evidence of cyanosis: No     Musculoskeletal:   Evidence of significant bone deformities: No   Spinal curvature: No     Integumentary:    Obvious bruising: No   Lacerations or discoloration on casual observation: No       Neurological Examination:   Mental Status:        MMSE  No flowsheet data found. Formal testing was not completed    there was nothing concerning on general observation and discussion.    Alert oriented and appropriate to general conversation  Normal processing on general observation  Followed conversation and responded seemingly appropriate throughout the office visit  No word finding difficulties noted on casual observation  Able to follow directions without difficulty     Cranial Nerves:    PERRLA,   EOMs intact gaze is conjugate  No nystagmus is appreciated  No JEANNA  Facial motor and sensory intact bilaterally  Hearing intact to conversation  Voice with normal projection, no evidence of secretion pooling  Palate elevates symmetrically  Shoulder shrug intact bilaterally  No tongue deviation appreciated     Motor:   Normal tone and bulk  Fine dexterity intact bilaterally  No tremor appreciated on today's exam  No abnormal movements appreciated on today's exam  Moves all extremities spontaneously and with purpose    Sensation: Intact to light touch bilaterally    Coordination/Cerebellar:   Grossly intact    Gait: Ambulates independently    Fall risk assessment  Fall Risk Assessment, last 12 mths 11/10/2020   Able to walk? Yes   Fall in past 12 months? No       Reflexes: Not tested    ASSESSMENT AND PLAN    Chronic headache and migraine disorder: Mixed   Cervicogenic   Occipital neuralgia   Migraines   Vascular tension headaches    Patient is to continue on reduced dose of butalbital half a tablet 3-4 times a day and Zanaflex 2 mg 3 times a day  We are going to start  gabapentin 100 mg 1 to 2 tablets up to 4 times a day as needed for breakthrough headache pain  Patient is extremely sensitive to medications so have asked her to start with just 1 or 2 tablets/day and increase as needed or tolerated  She also has a home traction unit and I have asked her to start using that again more consistently    We can consider Botox injections to see if there would be some benefit from that perspective  I sent a message to Dr. Ligia Benson to get his thoughts about this with this particular patient    We can try NurTec or Ubrelvy for rescue medicine or possibly consider CGRP therapy preventatively  Topamax or Depakote could be tried or perhaps a very low dose amitriptyline or nortriptyline or possibly trazodone    We can also consider atypicals such as Namenda, Zonegran or possibly lithium    What ever is tried it will need to be started at very low doses due to patient's medication sensitivity      She is to continue with all of her other medications and specialist.        ICD-10-CM ICD-9-CM    1. Occipital neuralgia of right side  M54.81 723.8 gabapentin (NEURONTIN) 100 mg capsule   2. Cervicogenic headache  R51.9 784.0 gabapentin (NEURONTIN) 100 mg capsule   3. Cervical radiculopathy due to degenerative joint disease of spine  M47.22 721.0    4. Tension vascular headache  G44.209 307.81    5.  At risk for side effect of medication  Z91.89 V49.89            Additional pertinent medical data reviewed at today's office visit:       Office Visit on 09/24/2020   Component Date Value    WBC 09/25/2020 7.0     RBC 09/25/2020 4.05     HGB 09/25/2020 12.8     HCT 09/25/2020 38.0     MCV 09/25/2020 94     MCH 09/25/2020 31.6     MCHC 09/25/2020 33.7     RDW 09/25/2020 12.0     PLATELET 03/92/3255 871     NEUTROPHILS 09/25/2020 55     Lymphocytes 09/25/2020 32     MONOCYTES 09/25/2020 10     EOSINOPHILS 09/25/2020 2     BASOPHILS 09/25/2020 1     ABS. NEUTROPHILS 09/25/2020 3.9     Abs Lymphocytes 09/25/2020 2.2     ABS. MONOCYTES 09/25/2020 0.7     ABS. EOSINOPHILS 09/25/2020 0.1     ABS. BASOPHILS 09/25/2020 0.1     IMMATURE GRANULOCYTES 09/25/2020 0     ABS. IMM. GRANS. 09/25/2020 0.0     Anti-DNA (DS) Ab, QT 09/25/2020 1     RNP Abs 09/25/2020 <0.2     Leal Abs 09/25/2020 <0.2     Leal/RNP Abs 09/25/2020 <0.2     Scleroderma-70 Ab 09/25/2020 <0.2     Sjogren's Anti-SS-A 09/25/2020 <0.2     Sjogren's Anti-SS-B 09/25/2020 0.3     Antichromatin Ab 09/25/2020 <0.2     Anti Ribosomal P Ab 09/25/2020 <0.2     Anti-Dhara-1 09/25/2020 <0.2     Centromere B Ab 09/25/2020 <0.2     See below 09/25/2020 Comment     Sed rate (ESR) 09/25/2020 16        XR Results (maximum last 3): Results from Hospital Encounter encounter on 09/24/20   XR SPINE CERV W OBL/FLEX/EXT MIN 6 V COMP    Impression IMPRESSION:  Degenerative disc and joint disease as detailed above. CT Results (maximum last 3): No results found for this or any previous visit. MRI Results (maximum last 3): Results from [place] encounter on 10/09/20   MRI BRAIN WO CONT    Impression IMPRESSION:     1. Normal brain MRI. Results from [place] encounter on 02/29/12   MRI SHOULDER LEFT WITHOUT CONTRAST         Follow-up and Dispositions    · Return in about 6 weeks (around 2/2/2021) for Virtual visit.            Audrey Pichardo, MS, ANP-BC, MSCN  ,

## 2020-12-22 NOTE — PATIENT INSTRUCTIONS
Supportive discussion today in the office  We are going to add in gabapentin gabapentin 100 mg 1 to 2 tablets up to 4 times a day as needed for breakthrough headache pain  For you start slow and low and titrate up as needed and I am hopeful that may be can back down on the butalbital continue with the Zanaflex    I would also consider restarting your traction and again start low and slow and work your way back to the 8 pounds for 8 minutes/day and if it helps terrific if it does not then we can discontinue it but I do think he will probably get some benefit from it    We will see you back for virtual visit in approximately 6 weeks    Wishing you a very Rachelle Sandhu and a [place]    o Phone calls/patient messages:  Please allow up to 24 hours for someone in the office to contact you about your call or message. Be mindful your provider may be out of the office or your message may require further review. We encourage you to use [employer] for your messages as this is a faster, more efficient way to communicate with our office    o Medication Refills:  Prescription medications require up to 48 business hours to process. We encourage you to use [employer] for your refills. For controlled medications: Please allow up to 72 business hours to process. Certain medications may require you to  a written prescription at our office. NO narcotic/controlled medications will be prescribed after 4pm Monday through Friday or on weekends    o Form/Paperwork Completion:  We ask that you allow 7-14 business days. You may also download your forms to [employer] to have your doctor print off.

## 2021-02-05 ENCOUNTER — VIRTUAL VISIT (OUTPATIENT)
Dept: NEUROLOGY | Age: 76
End: 2021-02-05
Payer: MEDICARE

## 2021-02-05 DIAGNOSIS — G44.86 CERVICOGENIC HEADACHE: ICD-10-CM

## 2021-02-05 DIAGNOSIS — G44.209 TENSION VASCULAR HEADACHE: ICD-10-CM

## 2021-02-05 DIAGNOSIS — M47.22 CERVICAL RADICULOPATHY DUE TO DEGENERATIVE JOINT DISEASE OF SPINE: ICD-10-CM

## 2021-02-05 DIAGNOSIS — M54.81 OCCIPITAL NEURALGIA OF RIGHT SIDE: Primary | ICD-10-CM

## 2021-02-05 PROCEDURE — G8400 PT W/DXA NO RESULTS DOC: HCPCS | Performed by: PSYCHIATRY & NEUROLOGY

## 2021-02-05 PROCEDURE — G8420 CALC BMI NORM PARAMETERS: HCPCS | Performed by: PSYCHIATRY & NEUROLOGY

## 2021-02-05 PROCEDURE — 99214 OFFICE O/P EST MOD 30 MIN: CPT | Performed by: PSYCHIATRY & NEUROLOGY

## 2021-02-05 PROCEDURE — 1101F PT FALLS ASSESS-DOCD LE1/YR: CPT | Performed by: PSYCHIATRY & NEUROLOGY

## 2021-02-05 PROCEDURE — G8536 NO DOC ELDER MAL SCRN: HCPCS | Performed by: PSYCHIATRY & NEUROLOGY

## 2021-02-05 PROCEDURE — 3017F COLORECTAL CA SCREEN DOC REV: CPT | Performed by: PSYCHIATRY & NEUROLOGY

## 2021-02-05 PROCEDURE — G8427 DOCREV CUR MEDS BY ELIG CLIN: HCPCS | Performed by: PSYCHIATRY & NEUROLOGY

## 2021-02-05 PROCEDURE — G8432 DEP SCR NOT DOC, RNG: HCPCS | Performed by: PSYCHIATRY & NEUROLOGY

## 2021-02-05 PROCEDURE — 1090F PRES/ABSN URINE INCON ASSESS: CPT | Performed by: PSYCHIATRY & NEUROLOGY

## 2021-02-05 NOTE — PATIENT INSTRUCTIONS
As per our discussion today I think we have a good protocol to help manage your occipital neuralgia Continue with the occipital nerve blocks with pain management but as we discussed Dr. Keren Horan did mention that he would be willing to bring it in house to do it here should you not be able to get routine appointments with your current pain management team 
Continue with the gabapentin and the Fioricet as appropriate with de-escalation of use after the occipital nerve blocks and slow increase of use as the nerve blocks were off while waiting for the next nerve block Continue with physical therapy but please check with pain management regarding adding the dry needling but from my perspective I have absolutely no objection to it Awesome that you are residential through your Covid vaccination :-) I strongly encourage you to use my chart if you need to contact us. Presently with a high utilization of telehealth it is very difficult to get through on her phone lines. If you have not already activated my chart or you forget your password their instructions in this packet to get my chart activated. Happy birthday :-) Office Policies 
 
o Phone calls/patient messages: Please allow up to 24 hours for someone in the office to contact you about your call or message. Be mindful your provider may be out of the office or your message may require further review. We encourage you to use [employer] for your messages as this is a faster, more efficient way to communicate with our office 
 
o Medication Refills: 
Prescription medications require up to 48 business hours to process. We encourage you to use [employer] for your refills. For controlled medications: Please allow up to 72 business hours to process. Certain medications may require you to  a written prescription at our office.  
 
NO narcotic/controlled medications will be prescribed after 4pm Monday through Friday or on weekends 
 
o Form/Paperwork Completion: We ask that you allow 7-14 business days. You may also download your forms to [employer] to have your doctor print off.

## 2021-02-05 NOTE — PROGRESS NOTES
[address]  TELEHEALTH Virtual FOLLOW-UP VISIT        Michael Ha is a 77 y.o. female who was seen by synchronous (real-time) audio-video technology on 2/5/2021. Michael Ha is a 77 y.o. female who presents today for the following:  Chief Complaint   Patient presents with    Follow-up     PT and massage only help some, they suggested dry needling for inflammation , seeing pain management    Insomnia     up at night with headaches, \"right side, occipital, shoulder, hand and arm painful and asleep\"sleeps in chair alot     Headache     visual disturbance prior to headache         ASSESSMENT AND PLAN    Patient has chronic pain syndrome  From our perspective are managing occipital neuralgia and cervicogenic headaches with medication gabapentin, Fioricet, Zanaflex  She is getting interventional therapy through pain management which she also finds helpful  She is using physical therapy as prescribed by pain management which is helpful and have asked her to discuss with them adding the dry needling to the order    She is reasonably stable. Patient does understand that she will have to [facility] with pain\" but for right now things are reasonably manageable although slowly escalating as she gets closer to needing her repeat injections. And she is to continue to use her medications prescribed us in a stratified fashion depending on pain level severity            ICD-10-CM ICD-9-CM    1. Occipital neuralgia of right side  M54.81 723.8    2. Cervicogenic headache  R51.9 784.0    3. Cervical radiculopathy due to degenerative joint disease of spine  M47.22 721.0    4.  Tension vascular headache  G44.209 307.81          I attest that 30 minutes was spent on today's visit reviewing medical records and diagnostic testing deemed pertinent to this patient's care, along with direct time spent at patient's visit including the history, physical assessment and plan, discussing diagnosis and management along with documentation. HPI  Historical Data  Patient is known to the practice and has been previously seen by Dr. Diana Fajardo     Neurologic diagnosis  Headaches and migraines             Onset: 2017 when she fell and hit her head             Location right side starting at the base of her skull radiating to her eyebrow             Associated symptoms: Nausea and vomiting along with light and sound sensitivity     Occipital neuralgia             Has received occipital nerve blocks             Hx gave months of relief              Now non-sustained     Cervical spinal degenerative disc disease with moderately severe foraminal stenosis but without cord compression     Degenerative lumbar spinal stenosis: Severe at L3-4 and L4-5     Patient is followed by pain management: Dr. Sandoval Freddy     Interim Data:      Headaches and migraines  Using butalbital 2 tablets 4 times a day  Additionally she continues on Zanaflex 2 mg at bedtime but finds she cannot take it during the day  She has headache pain 24/7  Mostly on the right as stated above  She continues on all of her baseline medications  Additionally she is using lidocaine gel as well as Voltaren gel and CBD oil  She has trouble sleeping at night secondary to the pain  Her activities are significantly curtailed. She is a  and does dog agility training as well she has not been able to do any of her normal activities and she is very frustrated by it. Office visit 11/10/2020 we started Cymbalta titrating to 20 mg this was started for pain management headaches and mood; due to patient's high sensitivity to medications a very slow induction titration was planned which is why she is only on 20 mg     OV 12/22/2020:could not take cymbalta; got Occipital Nerve block 6 weeks ago;  Fioricet decreased 1/2 tab TID  And zanaflex 1 tab Q8 hours; continues w ON pain all the time no matter what; overall did well up until about a week ago occipital nerve pain is worsening she does feel as though the epidural cervical blocks she got from pain management has helped with the neck and upper back pain but the headaches are starting to escalate again. She does feel as though she might benefit from occipital nerve blocks again and she has an appointment with pain management in the beginning in January    OV 2/5/2021Occiptial N blocks improved and gabapentin only q 6 hours and off routine Fioricet: night is worse and only getting about 4-5 ours of sleep then has to get up and walk around to reduce the pain; needing to use Fioricet about 3 AM when she walks up; physical therapy is helpful and she is wondering about dry needling; she also continues on her Zanaflex       Other  She does attest to being depressed but not suicidal or wanting to harm her self  She continues to be seen by Dr. Zachery Obrien pain management she gets epidural injections as well as occipital nerve blocks        Allergies   Allergen Reactions    Penicillamine Rash    Synthroid [Levothyroxine] Rash and Itching     To breast area       Current Outpatient Medications   Medication Sig    gabapentin (NEURONTIN) 100 mg capsule 1 to 2 tablets up to 4 times a day as needed for headache/migraine rescue  Indications: chronic headaches    omeprazole (PRILOSEC) 20 mg capsule Take 20 mg by mouth daily.  atorvastatin (LIPITOR) 10 mg tablet     diclofenac EC (VOLTAREN) 75 mg EC tablet     cholecalciferol, vitamin D3, (Vitamin D3) 50 mcg (2,000 unit) tab Take  by mouth.  coenzyme Q-10 (Co Q-10) 200 mg capsule Take  by mouth daily.  cyanocobalamin 1,000 mcg tablet Take 3,000 mcg by mouth daily.  b complex vitamins tablet Take 1 Tab by mouth daily.  butalbital-acetaminophen-caffeine (FIORICET, ESGIC) -40 mg per tablet Take 2 Tabs by mouth every eight (8) hours as needed for Headache.  tiZANidine (ZANAFLEX) 2 mg tablet Take 1 Tab by mouth three (3) times daily as needed for Muscle Spasm(s).     fexofenadine (ALLEGRA) 180 mg tablet Take 180 mg by mouth daily.  conjugated estrogens (PREMARIN) 0.625 mg tablet Take  by mouth.  thyroid (ARMOUR) 90 mg tablet Take 1 Tab by mouth daily. No current facility-administered medications for this visit. Past medical history/surgical history, family history, and social history have been reviewed for today's visit      ROS    A ten system review of constitutional, cardiovascular, respiratory, musculoskeletal, endocrine, skin, SHEENT, genitourinary, psychiatric and neurologic systems was obtained and is unremarkable except as mentioned under HPI          EXAMINATION: Within the context of virtual telehealth visit:    General appearance: Patient is well-developed and well-nourished in no apparent distress and well groomed.     Psych/mental health:  Affect: Appropriate    PHQ  3 most recent PHQ Screens 2/5/2021   Little interest or pleasure in doing things Not at all   Feeling down, depressed, irritable, or hopeless Several days   Total Score PHQ 2 1   Trouble falling or staying asleep, or sleeping too much -   Feeling tired or having little energy -   Poor appetite, weight loss, or overeating -   Feeling bad about yourself - or that you are a failure or have let yourself or your family down -   Trouble concentrating on things such as school, work, reading, or watching TV -   Moving or speaking so slowly that other people could have noticed; or the opposite being so fidgety that others notice -   Thoughts of being better off dead, or hurting yourself in some way -   PHQ 9 Score -   How difficult have these problems made it for you to do your work, take care of your home and get along with others -       HEENT:   Normocephalic  With evidence of trauma: No  Full range of motion head neck: Yes  Tenderness to palpation of the head neck region: N/A      Cardiovascular:     Extremities warm to touch: N/A  Extremity swelling: No  Discoloration: No  Evidence of PVD: No    Respiratory:   Dyspnea on exertion: No   Abnormal effort on casual observation: No   Use of portable oxygen: No   Evidence of cyanosis: No     Musculoskeletal:   Evidence of significant bone deformities: No   Spinal curvature: No     Integumentary:    Obvious bruising: No   Lacerations or discoloration on casual observation: No       Neurological Examination:   Mental Status:        MMSE  No flowsheet data found. Formal testing was not completed    there was nothing concerning on general observation and discussion. Alert oriented and appropriate to general conversation  Normal processing on general observation  Followed conversation and responded seemingly appropriate throughout the office visit  No word finding difficulties noted on casual observation  Able to follow directions without difficulty     Cranial Nerves:    Grossly intact    Motor:   Normal bulk  No tremor appreciated on today's exam  No abnormal movements appreciated on today's exam  Moves extremities spontaneously and with purpose      Sensation: Not tested    Coordination/Cerebellar:   Grossly intact    Gait: Not tested    Fall risk assessment  Fall Risk Assessment, last 12 mths 2/5/2021   Able to walk? Yes   Fall in past 12 months? 0   Do you feel unsteady? 0   Are you worried about falling 0           Due to this being a TeleHealth evaluation, many elements of the physical examination are unable to be assessed. Pursuant to the emergency declaration under the [facility], [facility] waiver authority and the [employer] Act, this Virtual  Visit was conducted, with patient's consent, to reduce the patient's risk of exposure to COVID-19 and provide continuity of care for an established patient. Services were provided through a video synchronous discussion virtually to substitute for in-person clinic visit.     Follow-up and Dispositions    · Return in about 2 months (around 4/5/2021) for Virtual visit.            Diane Baez MS, ANP-BC, [facility]

## 2021-02-10 ENCOUNTER — PATIENT MESSAGE (OUTPATIENT)
Dept: NEUROLOGY | Age: 76
End: 2021-02-10

## 2021-02-10 DIAGNOSIS — G44.86 CERVICOGENIC HEADACHE: Primary | ICD-10-CM

## 2021-02-10 DIAGNOSIS — M54.81 OCCIPITAL NEURALGIA OF RIGHT SIDE: ICD-10-CM

## 2021-03-16 NOTE — TELEPHONE ENCOUNTER
Katharine Frey, LPN 1/29/4139 9:45 AM EDT      ----- Message -----  From: Fannie Arshad  Sent: 3/16/2021 6:39 AM EDT  To: Leda Lee  Subject: RE: Non-Urgent Medical Question     Good Morning, is it possible to get the doctor in your office to give me an occipital injection? Dr Annita Cogan not available untilApril 6. Im not sure if its medicine related or not but for the last couple of weeks Marquez had serious diarrhea. Megan Hunger been able to get it to stop with anti diarrhea pill but it returns. Im only taking minimal pain meds during the day and my prescription meds. Marquez had some good days but terrible nights. Would like to see orthopedics about shoulder pain, particularly at night. Occipital pain and shoulder pain along with hand numbness and resulting headache equals not much sleep.

## 2021-04-05 ENCOUNTER — VIRTUAL VISIT (OUTPATIENT)
Dept: NEUROLOGY | Age: 76
End: 2021-04-05
Payer: MEDICARE

## 2021-04-05 ENCOUNTER — TELEPHONE (OUTPATIENT)
Dept: NEUROLOGY | Age: 76
End: 2021-04-05

## 2021-04-05 DIAGNOSIS — G44.86 CERVICOGENIC HEADACHE: ICD-10-CM

## 2021-04-05 DIAGNOSIS — G44.209 TENSION VASCULAR HEADACHE: ICD-10-CM

## 2021-04-05 DIAGNOSIS — G43.109 MIGRAINE WITH AURA, NOT INTRACTABLE, WITHOUT STATUS MIGRAINOSUS: ICD-10-CM

## 2021-04-05 DIAGNOSIS — M48.061 DEGENERATIVE LUMBAR SPINAL STENOSIS: ICD-10-CM

## 2021-04-05 DIAGNOSIS — M54.81 OCCIPITAL NEURALGIA OF RIGHT SIDE: ICD-10-CM

## 2021-04-05 DIAGNOSIS — M47.22 CERVICAL RADICULOPATHY DUE TO DEGENERATIVE JOINT DISEASE OF SPINE: ICD-10-CM

## 2021-04-05 DIAGNOSIS — G44.86 CERVICOGENIC HEADACHE: Primary | ICD-10-CM

## 2021-04-05 DIAGNOSIS — F34.1 DYSTHYMIA: ICD-10-CM

## 2021-04-05 PROCEDURE — G8427 DOCREV CUR MEDS BY ELIG CLIN: HCPCS | Performed by: PSYCHIATRY & NEUROLOGY

## 2021-04-05 PROCEDURE — 3017F COLORECTAL CA SCREEN DOC REV: CPT | Performed by: PSYCHIATRY & NEUROLOGY

## 2021-04-05 PROCEDURE — 99214 OFFICE O/P EST MOD 30 MIN: CPT | Performed by: PSYCHIATRY & NEUROLOGY

## 2021-04-05 PROCEDURE — 1101F PT FALLS ASSESS-DOCD LE1/YR: CPT | Performed by: PSYCHIATRY & NEUROLOGY

## 2021-04-05 PROCEDURE — G8432 DEP SCR NOT DOC, RNG: HCPCS | Performed by: PSYCHIATRY & NEUROLOGY

## 2021-04-05 PROCEDURE — G8400 PT W/DXA NO RESULTS DOC: HCPCS | Performed by: PSYCHIATRY & NEUROLOGY

## 2021-04-05 PROCEDURE — G8420 CALC BMI NORM PARAMETERS: HCPCS | Performed by: PSYCHIATRY & NEUROLOGY

## 2021-04-05 PROCEDURE — 1090F PRES/ABSN URINE INCON ASSESS: CPT | Performed by: PSYCHIATRY & NEUROLOGY

## 2021-04-05 PROCEDURE — G8536 NO DOC ELDER MAL SCRN: HCPCS | Performed by: PSYCHIATRY & NEUROLOGY

## 2021-04-05 RX ORDER — CHOLECALCIFEROL (VITAMIN D3) 50 MCG
CAPSULE ORAL DAILY
COMMUNITY

## 2021-04-05 RX ORDER — BUTALBITAL, ACETAMINOPHEN AND CAFFEINE 50; 325; 40 MG/1; MG/1; MG/1
2 TABLET ORAL
Qty: 50 TAB | Refills: 5 | Status: SHIPPED | OUTPATIENT
Start: 2021-04-05 | End: 2021-11-12 | Stop reason: SDUPTHER

## 2021-04-05 RX ORDER — LEVOTHYROXINE AND LIOTHYRONINE 57; 13.5 UG/1; UG/1
TABLET ORAL
COMMUNITY

## 2021-04-05 NOTE — PROGRESS NOTES
Chief Complaint   Patient presents with    Headache     migraines  2 month f/u    Medication Evaluation       1. Have you been to the ER, urgent care clinic since your last visit? Hospitalized since your last visit? no    2. Have you seen or consulted any other health care providers outside of the [address] [place] [address] since your last visit? Include any pap smears or colon screening.   no

## 2021-04-05 NOTE — PATIENT INSTRUCTIONS
As per our discussion I am thrilled that you are feeling much better But not as thrilled as you are LOL Remember not to overdo it when you are feeling well Continue on all your prescription medications as we discussed and I discontinued the Zanaflex which is also known as tizanidine Follow-up with all your regular providers as appropriate If you have a vitamin D level you may need to go back on supplements but I would recommend the gummy supplements versus the tablets as the Gummies tend to not upset stomach as much as tablets do Thank you for getting your Covid vaccination completed :-) I strongly encourage you to use my chart if you need to contact us. Presently with a high utilization of telehealth it is very difficult to get through on her phone lines. If you have not already activated my chart or you forget your password their instructions in this packet to get my chart activated. Office Policies 
 
o Phone calls/patient messages: Please allow up to 24 hours for someone in the office to contact you about your call or message. Be mindful your provider may be out of the office or your message may require further review. We encourage you to use [employer] for your messages as this is a faster, more efficient way to communicate with our office 
 
o Medication Refills: 
Prescription medications require up to 48 business hours to process. We encourage you to use [employer] for your refills. For controlled medications: Please allow up to 72 business hours to process. Certain medications may require you to  a written prescription at our office. NO narcotic/controlled medications will be prescribed after 4pm Monday through Friday or on weekends 
 
o Form/Paperwork Completion: We ask that you allow 7-14 business days. You may also download your forms to [employer] to have your doctor print off.
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
## 2021-06-09 ENCOUNTER — TELEPHONE (OUTPATIENT)
Dept: NEUROLOGY | Age: 76
End: 2021-06-09

## 2021-06-09 NOTE — TELEPHONE ENCOUNTER
----- Message from [employer] sent at 6/9/2021  8:28 AM EDT -----  Regarding: /telephone  General Message/Vendor Calls    Caller's first and last name:      Reason for call: The pt stated she needs to get a \"Nerve block\" scheduled. Callback required yes/no and why:Yes.       Best contact number(s):[phone]

## 2021-07-04 DIAGNOSIS — G44.86 CERVICOGENIC HEADACHE: ICD-10-CM

## 2021-07-04 DIAGNOSIS — M54.81 OCCIPITAL NEURALGIA OF RIGHT SIDE: ICD-10-CM

## 2021-07-04 RX ORDER — GABAPENTIN 100 MG/1
CAPSULE ORAL
Qty: 240 CAPSULE | Refills: 3 | Status: CANCELLED | OUTPATIENT
Start: 2021-07-04

## 2021-07-09 DIAGNOSIS — M54.81 OCCIPITAL NEURALGIA OF RIGHT SIDE: ICD-10-CM

## 2021-07-09 DIAGNOSIS — G44.86 CERVICOGENIC HEADACHE: ICD-10-CM

## 2021-07-12 RX ORDER — GABAPENTIN 100 MG/1
CAPSULE ORAL
Qty: 240 CAPSULE | Refills: 3 | Status: SHIPPED | OUTPATIENT
Start: 2021-07-12 | End: 2022-03-07

## 2021-08-09 ENCOUNTER — VIRTUAL VISIT (OUTPATIENT)
Dept: NEUROLOGY | Age: 76
End: 2021-08-09
Payer: MEDICARE

## 2021-08-09 DIAGNOSIS — G44.86 CERVICOGENIC HEADACHE: ICD-10-CM

## 2021-08-09 DIAGNOSIS — G43.109 MIGRAINE WITH AURA, NOT INTRACTABLE, WITHOUT STATUS MIGRAINOSUS: ICD-10-CM

## 2021-08-09 DIAGNOSIS — M54.81 OCCIPITAL NEURALGIA OF RIGHT SIDE: Primary | ICD-10-CM

## 2021-08-09 DIAGNOSIS — M47.22 CERVICAL RADICULOPATHY DUE TO DEGENERATIVE JOINT DISEASE OF SPINE: ICD-10-CM

## 2021-08-09 DIAGNOSIS — M48.02 DEGENERATIVE CERVICAL SPINAL STENOSIS: ICD-10-CM

## 2021-08-09 DIAGNOSIS — G44.209 TENSION VASCULAR HEADACHE: ICD-10-CM

## 2021-08-09 DIAGNOSIS — M48.061 DEGENERATIVE LUMBAR SPINAL STENOSIS: ICD-10-CM

## 2021-08-09 PROBLEM — M54.16 LUMBAR RADICULOPATHY: Status: ACTIVE | Noted: 2020-08-18

## 2021-08-09 PROBLEM — S81.802A WOUND OF LEFT LEG: Status: ACTIVE | Noted: 2017-12-13

## 2021-08-09 PROBLEM — M25.811 SHOULDER IMPINGEMENT, RIGHT: Status: ACTIVE | Noted: 2020-04-06

## 2021-08-09 PROBLEM — M47.812 CERVICAL SPONDYLOSIS: Status: ACTIVE | Noted: 2017-05-02

## 2021-08-09 PROBLEM — M75.41 SHOULDER IMPINGEMENT, RIGHT: Status: ACTIVE | Noted: 2020-04-06

## 2021-08-09 PROBLEM — M79.18 MYOFASCIAL PAIN DYSFUNCTION SYNDROME: Status: ACTIVE | Noted: 2017-05-02

## 2021-08-09 PROBLEM — S81.802A WOUND OF LEFT LEG: Status: RESOLVED | Noted: 2017-12-13 | Resolved: 2021-08-09

## 2021-08-09 PROBLEM — E03.8 OTHER SPECIFIED ACQUIRED HYPOTHYROIDISM: Status: ACTIVE | Noted: 2017-03-21

## 2021-08-09 PROCEDURE — G8400 PT W/DXA NO RESULTS DOC: HCPCS | Performed by: PSYCHIATRY & NEUROLOGY

## 2021-08-09 PROCEDURE — 1101F PT FALLS ASSESS-DOCD LE1/YR: CPT | Performed by: PSYCHIATRY & NEUROLOGY

## 2021-08-09 PROCEDURE — G8420 CALC BMI NORM PARAMETERS: HCPCS | Performed by: PSYCHIATRY & NEUROLOGY

## 2021-08-09 PROCEDURE — 3017F COLORECTAL CA SCREEN DOC REV: CPT | Performed by: PSYCHIATRY & NEUROLOGY

## 2021-08-09 PROCEDURE — G8536 NO DOC ELDER MAL SCRN: HCPCS | Performed by: PSYCHIATRY & NEUROLOGY

## 2021-08-09 PROCEDURE — 1090F PRES/ABSN URINE INCON ASSESS: CPT | Performed by: PSYCHIATRY & NEUROLOGY

## 2021-08-09 PROCEDURE — G8427 DOCREV CUR MEDS BY ELIG CLIN: HCPCS | Performed by: PSYCHIATRY & NEUROLOGY

## 2021-08-09 PROCEDURE — G8432 DEP SCR NOT DOC, RNG: HCPCS | Performed by: PSYCHIATRY & NEUROLOGY

## 2021-08-09 PROCEDURE — 99214 OFFICE O/P EST MOD 30 MIN: CPT | Performed by: PSYCHIATRY & NEUROLOGY

## 2021-08-09 RX ORDER — DIPHENOXYLATE HCL/ATROPINE 2.5-.025/5
5 LIQUID (ML) ORAL
COMMUNITY
End: 2022-07-26

## 2021-08-09 RX ORDER — COLESEVELAM 180 1/1
TABLET ORAL
COMMUNITY
Start: 2021-07-23 | End: 2022-01-07

## 2021-08-09 NOTE — PATIENT INSTRUCTIONS
As per our discussion  I am really glad that you are doing so much better  Sorry to hear about the diarrhea and the GI issues but hopefully that will get squared away soon  Continue to work on minimizing your medications not just from a GI standpoint but I think overall it is beneficial for the headaches   In a perfect world I do not want you taking rescue medicine more than 2 days/week but overall you are doing tremendously well keep up the good work    You need to get a hold of me before we see you back my chart is most efficient method of communication    Office Policies    o Phone calls/patient messages:  Please allow up to 24 hours for someone in the office to contact you about your call or message. Be mindful your provider may be out of the office or your message may require further review. We encourage you to use [employer] for your messages as this is a faster, more efficient way to communicate with our office    o Medication Refills:  Prescription medications require up to 48 business hours to process. We encourage you to use [employer] for your refills. For controlled medications: Please allow up to 72 business hours to process. Certain medications may require you to  a written prescription at our office. NO narcotic/controlled medications will be prescribed after 4pm Monday through Friday or on weekends    o Form/Paperwork Completion:  We ask that you allow 7-14 business days. You may also download your forms to [employer] to have your doctor print off.

## 2021-08-09 NOTE — PROGRESS NOTES
[facility]  TELEHEALTH Virtual FOLLOW-UP VISIT        Nahomy Weston is a 77 y.o. female who was seen by synchronous (real-time) audio-video technology on 8/9/2021. Nahomy Weston is a 77 y.o. female who presents today for the following:  Chief Complaint   Patient presents with    Follow-up     improving     Migraine         ASSESSMENT AND PLAN    Patient has chronic pain syndrome due to multiple etiologies  Currently stable on current protocol  In fact she is actually improved  Patient is having reduction in rescue medication   Patient has been encouraged to continue to try to reduce rescue medication as appropriate but not to the extent or quality of life is interrupted    Continue to follow-up with all providers as appropriate to include orthopedics pain management and primary care        ICD-10-CM ICD-9-CM    1. Occipital neuralgia of right side  M54.81 723.8    2. Cervicogenic headache  R51.9 784.0    3. Degenerative lumbar spinal stenosis  M48.061 724.02    4. Tension vascular headache  G44.209 307.81    5. Cervical radiculopathy due to degenerative joint disease of spine  M47.22 721.0    6. Degenerative cervical spinal stenosis  M48.02 723.0    7. Migraine with aura, not intractable, without status migrainosus  G43.109 346.00          I attest that 30 minutes was spent on today's visit reviewing medical records and diagnostic testing deemed pertinent to this patient's care, along with direct time spent at patient's visit including the history, physical assessment and plan, discussing diagnosis and management along with documentation.       HPI  Historical Data  Patient is known to the practice and has been previously seen by Dr. Demarcus Gonzalez     Neurologic diagnosis  Headaches and migraines             Onset: 2017 when she fell and hit her head             Location right side starting at the base of her skull radiating to her eyebrow             Associated symptoms: Nausea and vomiting along with light and sound sensitivity     Occipital neuralgia             Has received occipital nerve blocks             Hx gave months of relief              Now non-sustained     Cervical spinal degenerative disc disease with moderately severe foraminal stenosis but without cord compression     Degenerative lumbar spinal stenosis: Severe at L3-4 and L4-5     Patient is followed by pain management: Dr. Maria A Chua     Interim Data:      Headaches and migraines  Due to diarrhea: limiting meds    appt with GI pending    Using butalbital about  1/2-1BID; usually 1/2 tab   Occasionally taking gabapentin up to 2 times/day  Still using massage therapy and traction  Still with daily headache but not lasting all day [was 24/7]  Mostly on RT side  Usually wakes at 4 or 5 AM with a headache and uses Butalbital  Still getting occipital nerve blocks every 2-3 months  Still getting cervical injections about every 6 months   Sees Dr Maria A Chua for pain mangement      Cannot tolerate: cymbalta zanaflex          Allergies   Allergen Reactions    Cephalexin Other (comments)     Other reaction(s): Dermatological problems, e.g., rash, hives    Penicillins Rash     Other reaction(s): Dermatological problems, e.g., rash, hives  Other reaction(s): Dermatological problems, e.g., rash, hives      Aspirin Rash     Unable to take large dosages  Unable to take large dosages      Codeine Other (comments)     Rash, unable to take large dosages       Penicillamine Rash    Penicillamine Rash    Synthroid [Levothyroxine] Rash and Itching     To breast area    Levothyroxine Itching and Rash     To breast area       Current Outpatient Medications   Medication Sig    colesevelam (WELCHOL) 625 mg tablet TAKE 1-2 TABLETS BY MOUTH WITH MEAL THREE TIMES DAILY    diphenoxylate-atropine (LOMOTIL) 2.5-0.025 mg/5 mL liquid Take 5 mL by mouth.     gabapentin (NEURONTIN) 100 mg capsule 1 to 2 tablets up to 4 times a day as needed for headache/migraine rescue  Indications: chronic headaches    B.infantis-B.ani-B.long-B.bifi (Probiotic 4X) 10-15 mg TbEC Take  by mouth.  butalbital-acetaminophen-caffeine (FIORICET, ESGIC) -40 mg per tablet Take 2 Tabs by mouth every eight (8) hours as needed for Headache.  atorvastatin (LIPITOR) 10 mg tablet     diclofenac EC (VOLTAREN) 75 mg EC tablet     conjugated estrogens (PREMARIN) 0.625 mg tablet Take  by mouth.  thyroid (ARMOUR) 90 mg tablet Take 1 Tab by mouth daily.  thyroid, [place], ([place] Thyroid) 90 mg tablet [place] Thyroid 90 mg tablet   Take 1 tablet every day by oral route. (Patient not taking: Reported on 8/9/2021)     No current facility-administered medications for this visit. Past medical history/surgical history, family history, and social history have been reviewed for today's visit      ROS    A ten system review of constitutional, cardiovascular, respiratory, musculoskeletal, endocrine, skin, SHEENT, genitourinary, psychiatric and neurologic systems was obtained and is unremarkable except as mentioned under HPI          EXAMINATION: Within the context of virtual telehealth visit:    General appearance: Patient is well-developed and well-nourished in no apparent distress and well groomed.     Psych/mental health:  Affect: Appropriate    PHQ  3 most recent PHQ Screens 2/5/2021   Little interest or pleasure in doing things Not at all   Feeling down, depressed, irritable, or hopeless Several days   Total Score PHQ 2 1   Trouble falling or staying asleep, or sleeping too much -   Feeling tired or having little energy -   Poor appetite, weight loss, or overeating -   Feeling bad about yourself - or that you are a failure or have let yourself or your family down -   Trouble concentrating on things such as school, work, reading, or watching TV -   Moving or speaking so slowly that other people could have noticed; or the opposite being so fidgety that others notice - Thoughts of being better off dead, or hurting yourself in some way -   PHQ 9 Score -   How difficult have these problems made it for you to do your work, take care of your home and get along with others -       HEENT:   Normocephalic  With evidence of trauma: No  Full range of motion head neck: Yes  Tenderness to palpation of the head neck region: N/A      Cardiovascular:     Extremities warm to touch: N/A  Extremity swelling: No  Discoloration: No  Evidence of PVD: No    Respiratory:   Dyspnea on exertion: No   Abnormal effort on casual observation: No   Use of portable oxygen: No   Evidence of cyanosis: No     Musculoskeletal:   Evidence of significant bone deformities: No   Spinal curvature: No     Integumentary:    Obvious bruising: No   Lacerations or discoloration on casual observation: No       Neurological Examination:   Mental Status:        MMSE  No flowsheet data found. Formal testing was not completed    there was nothing concerning on general observation and discussion. Alert oriented and appropriate to general conversation  Normal processing on general observation  Followed conversation and responded seemingly appropriate throughout the office visit  No word finding difficulties noted on casual observation  Able to follow directions without difficulty     Cranial Nerves:    Grossly intact    Motor:   Normal bulk  No tremor appreciated on today's exam  No abnormal movements appreciated on today's exam  Moves extremities spontaneously and with purpose      Sensation: Not tested    Coordination/Cerebellar:   Grossly intact    Gait: Not tested    Fall risk assessment  Fall Risk Assessment, last 12 mths 2/5/2021   Able to walk? Yes   Fall in past 12 months? 0   Do you feel unsteady? 0   Are you worried about falling 0           Due to this being a TeleHealth evaluation, many elements of the physical examination are unable to be assessed.        Pursuant to the emergency declaration under the [facility] Act and the [address] waiver authority and the [employer] Act, this Virtual  Visit was conducted, with patient's consent, to reduce the patient's risk of exposure to COVID-19 and provide continuity of care for an established patient. Services were provided through a video synchronous discussion virtually to substitute for in-person clinic visit. Follow-up and Dispositions    · Return in about 6 months (around 2/9/2022) for Virtual visit.            Miles Olivier MS, ANP-BC, [facility]

## 2021-09-30 ENCOUNTER — HOSPITAL ENCOUNTER (OUTPATIENT)
Dept: PREADMISSION TESTING | Age: 76
Discharge: HOME | End: 2021-09-30
Payer: MEDICARE

## 2021-09-30 LAB
SARS-COV-2, XPLCVT: NOT DETECTED
SOURCE, COVRS: NORMAL

## 2021-09-30 PROCEDURE — U0005 INFEC AGEN DETEC AMPLI PROBE: HCPCS

## 2021-10-01 RX ORDER — DICYCLOMINE HYDROCHLORIDE 10 MG/1
10 CAPSULE ORAL EVERY 6 HOURS
COMMUNITY
End: 2022-01-07

## 2021-10-04 ENCOUNTER — HOSPITAL ENCOUNTER (OUTPATIENT)
Age: 76
Setting detail: OUTPATIENT SURGERY
Discharge: HOME | End: 2021-10-04
Attending: INTERNAL MEDICINE | Admitting: INTERNAL MEDICINE
Payer: MEDICARE

## 2021-10-04 ENCOUNTER — ANESTHESIA (OUTPATIENT)
Dept: ENDOSCOPY | Age: 76
End: 2021-10-04
Payer: MEDICARE

## 2021-10-04 ENCOUNTER — ANESTHESIA EVENT (OUTPATIENT)
Dept: ENDOSCOPY | Age: 76
End: 2021-10-04
Payer: MEDICARE

## 2021-10-04 VITALS
DIASTOLIC BLOOD PRESSURE: 52 MMHG | RESPIRATION RATE: 11 BRPM | OXYGEN SATURATION: 99 % | TEMPERATURE: 97.6 F | BODY MASS INDEX: 17.33 KG/M2 | HEART RATE: 62 BPM | HEIGHT: 65 IN | SYSTOLIC BLOOD PRESSURE: 127 MMHG | WEIGHT: 104 LBS

## 2021-10-04 PROCEDURE — 2709999900 HC NON-CHARGEABLE SUPPLY: Performed by: INTERNAL MEDICINE

## 2021-10-04 PROCEDURE — 74011250636 HC RX REV CODE- 250/636: Performed by: ANESTHESIOLOGY

## 2021-10-04 PROCEDURE — 74011250636 HC RX REV CODE- 250/636: Performed by: INTERNAL MEDICINE

## 2021-10-04 PROCEDURE — 77030021593 HC FCPS BIOP ENDOSC BSC -A: Performed by: INTERNAL MEDICINE

## 2021-10-04 PROCEDURE — 76040000019: Performed by: INTERNAL MEDICINE

## 2021-10-04 PROCEDURE — 76060000031 HC ANESTHESIA FIRST 0.5 HR: Performed by: INTERNAL MEDICINE

## 2021-10-04 PROCEDURE — 88305 TISSUE EXAM BY PATHOLOGIST: CPT

## 2021-10-04 RX ORDER — SODIUM CHLORIDE 0.9 % (FLUSH) 0.9 %
5-40 SYRINGE (ML) INJECTION EVERY 8 HOURS
Status: DISCONTINUED | OUTPATIENT
Start: 2021-10-04 | End: 2021-10-04 | Stop reason: HOSPADM

## 2021-10-04 RX ORDER — NALOXONE HYDROCHLORIDE 0.4 MG/ML
0.4 INJECTION, SOLUTION INTRAMUSCULAR; INTRAVENOUS; SUBCUTANEOUS
Status: DISCONTINUED | OUTPATIENT
Start: 2021-10-04 | End: 2021-10-04 | Stop reason: HOSPADM

## 2021-10-04 RX ORDER — SODIUM CHLORIDE 9 MG/ML
50 INJECTION, SOLUTION INTRAVENOUS CONTINUOUS
Status: DISCONTINUED | OUTPATIENT
Start: 2021-10-04 | End: 2021-10-04 | Stop reason: HOSPADM

## 2021-10-04 RX ORDER — EPINEPHRINE 0.1 MG/ML
1 INJECTION INTRACARDIAC; INTRAVENOUS
Status: DISCONTINUED | OUTPATIENT
Start: 2021-10-04 | End: 2021-10-04 | Stop reason: HOSPADM

## 2021-10-04 RX ORDER — SODIUM CHLORIDE 0.9 % (FLUSH) 0.9 %
5-40 SYRINGE (ML) INJECTION AS NEEDED
Status: DISCONTINUED | OUTPATIENT
Start: 2021-10-04 | End: 2021-10-04 | Stop reason: HOSPADM

## 2021-10-04 RX ORDER — FLUMAZENIL 0.1 MG/ML
0.2 INJECTION INTRAVENOUS
Status: DISCONTINUED | OUTPATIENT
Start: 2021-10-04 | End: 2021-10-04 | Stop reason: HOSPADM

## 2021-10-04 RX ORDER — ATROPINE SULFATE 0.1 MG/ML
0.5 INJECTION INTRAVENOUS
Status: DISCONTINUED | OUTPATIENT
Start: 2021-10-04 | End: 2021-10-04 | Stop reason: HOSPADM

## 2021-10-04 RX ORDER — DEXTROMETHORPHAN/PSEUDOEPHED 2.5-7.5/.8
1.2 DROPS ORAL
Status: DISCONTINUED | OUTPATIENT
Start: 2021-10-04 | End: 2021-10-04 | Stop reason: HOSPADM

## 2021-10-04 RX ORDER — PROPOFOL 10 MG/ML
INJECTION, EMULSION INTRAVENOUS AS NEEDED
Status: DISCONTINUED | OUTPATIENT
Start: 2021-10-04 | End: 2021-10-04 | Stop reason: HOSPADM

## 2021-10-04 RX ORDER — SODIUM CHLORIDE 9 MG/ML
25 INJECTION, SOLUTION INTRAVENOUS CONTINUOUS
Status: DISCONTINUED | OUTPATIENT
Start: 2021-10-04 | End: 2021-10-04 | Stop reason: HOSPADM

## 2021-10-04 RX ADMIN — PROPOFOL 30 MG: 10 INJECTION, EMULSION INTRAVENOUS at 10:13

## 2021-10-04 RX ADMIN — SODIUM CHLORIDE 50 ML/HR: 9 INJECTION, SOLUTION INTRAVENOUS at 09:43

## 2021-10-04 RX ADMIN — PROPOFOL 40 MG: 10 INJECTION, EMULSION INTRAVENOUS at 10:07

## 2021-10-04 RX ADMIN — PROPOFOL 20 MG: 10 INJECTION, EMULSION INTRAVENOUS at 10:16

## 2021-10-04 RX ADMIN — PROPOFOL 80 MG: 10 INJECTION, EMULSION INTRAVENOUS at 10:03

## 2021-10-04 RX ADMIN — PROPOFOL 30 MG: 10 INJECTION, EMULSION INTRAVENOUS at 10:10

## 2021-10-04 NOTE — ANESTHESIA POSTPROCEDURE EVALUATION
Procedure(s):  COLONOSCOPY WITH BIOPSY  COLON BIOPSY. general, total IV anesthesia    Anesthesia Post Evaluation      Multimodal analgesia: multimodal analgesia used between 6 hours prior to anesthesia start to PACU discharge  Patient location during evaluation: bedside  Patient participation: complete - patient participated  Level of consciousness: awake  Pain management: adequate  Airway patency: patent  Anesthetic complications: no  Cardiovascular status: acceptable  Respiratory status: acceptable  Hydration status: acceptable  Post anesthesia nausea and vomiting:  controlled  Final Post Anesthesia Temperature Assessment:  Normothermia (36.0-37.5 degrees C)      INITIAL Post-op Vital signs:   Vitals Value Taken Time   /120 10/04/21 1048   Temp 36.4 °C (97.6 °F) 10/04/21 1033   Pulse 67 10/04/21 1050   Resp 11 10/04/21 1050   SpO2 97 % 10/04/21 1050   Vitals shown include unvalidated device data.

## 2021-10-04 NOTE — ROUTINE PROCESS
Fannie Caballeros  1945  [id number]    Situation:  Verbal report received from: Rachna Hardy, RN  Procedure: Procedure(s):  COLONOSCOPY WITH BIOPSY  COLON BIOPSY    Background:    Preoperative diagnosis: Diarrhea, unspecified type [R19.7]  Postoperative diagnosis: Diverticulosis, hemorrhoids    :  Dr. Jeff Malave  Assistant(s): Endoscopy RN-1: Jessica James RN; Nina Cruz RN    Specimens:   ID Type Source Tests Collected by Time Destination   1 : Random colon bx Preservative   Tony Carney MD 10/4/2021 1018 Pathology     H. Pylori  no    Assessment:  Intra-procedure medications   Anesthesia gave intra-procedure sedation and medications, see anesthesia flow sheet yes    Intravenous fluids: NS@ KVO     Vital signs stable     Abdominal assessment: round and soft     Recommendation:  Discharge patient per MD order.     Family   Permission to share finding with family or friend yes    Glasses returned to patient

## 2021-10-04 NOTE — ANESTHESIA PREPROCEDURE EVALUATION
Relevant Problems   No relevant active problems       Anesthetic History   No history of anesthetic complications            Review of Systems / Medical History  Patient summary reviewed and pertinent labs reviewed    Pulmonary  Within defined limits                 Neuro/Psych         Headaches     Cardiovascular    Hypertension          Hyperlipidemia    Exercise tolerance: >4 METS     GI/Hepatic/Renal  Within defined limits              Endo/Other      Hypothyroidism: well controlled  Arthritis     Other Findings   Comments: Lumbar stenosis  Cervical stenosis         Physical Exam    Airway  Mallampati: I  TM Distance: > 6 cm    Mouth opening: Normal     Cardiovascular  Regular rate and rhythm,  S1 and S2 normal,  no murmur, click, rub, or gallop  Rhythm: regular  Rate: normal         Dental  No notable dental hx       Pulmonary  Breath sounds clear to auscultation               Abdominal  GI exam deferred       Other Findings            Anesthetic Plan    ASA: 2  Anesthesia type: general and total IV anesthesia          Induction: Intravenous  Anesthetic plan and risks discussed with: Patient

## 2021-10-04 NOTE — PROGRESS NOTES
Endoscope was pre-cleaned at the bedside immediately following procedure by LISA Bowden. Anesthesia reports 200mg Propofol and 550mL NS given during procedure. Received report from anesthesia staff on vital signs and status of patient.

## 2021-10-04 NOTE — PROCEDURES
NAME:  Carmen Yi   :   1945   MRN:   [id number]     Date/Time:  10/4/2021 10:04 AM    Colonoscopy Operative Report    Procedure Type:  Colonoscopy with biopsy     Indications: diarrhea, weight loss unexplained  Pre-operative Diagnosis: see indication above  Post-operative Diagnosis:  See findings below  :  Bon Lopez MD  Referring Provider: Percy Moeller MD    Exam:  Airway: clear, no airway problems anticipated  Heart: RRR, without gallops or rubs  Lungs: clear bilaterally without wheezes, crackles, or rhonchi  Abdomen: soft, nontender, nondistended, bowel sounds present  Mental Status: awake, alert and oriented to person, place and time    Sedation:  MAC anesthesia Propofol  Procedure Details:  After informed consent was obtained with all risks and benefits of procedure explained and preoperative exam completed, the patient was taken to the endoscopy suite and placed in the left lateral decubitus position. Upon sequential sedation as per above, a digital rectal exam was performed demonstrating internal and external hemorrhoids. The Olympus videocolonoscope  was inserted in the rectum and carefully advanced to the terminal ileum. The quality of preparation was good. The colonoscope was slowly withdrawn with careful evaluation between folds. Retroflexion in the rectum was completed demonstrating internal and external hemorrhoids. Findings:   ANUS: Anal exam reveals no masses but hemorrhoids, sphincter tone is normal.   RECTUM: Rectal exam reveals no masses but hemorrhoids. SIGMOID COLON: Diverticulosis, otherwise normal.  DESCENDING COLON: Diverticulosis, otherwise normal.  TRANSVERSE COLON: The mucosa is normal with good vascular pattern and without ulcers, diverticula, and polyps. ASCENDING COLON: The mucosa is normal with good vascular pattern and without ulcers, diverticula, and polyps.    CECUM: The appendiceal orifice appears normal. The ileocecal valve appears normal.   TERMINAL ILEUM: The terminal ileum was normal.    Specimen Removed:  Random colon  Complications:  None. EBL:     None. Impression:  -- diverticulosis, left-sided  -- internal hemorrhoids  -- normal mucosa otherwise, without explanation for diarrhea, biopsies obtained throughout    Recommendations:   -- await pathology  -- high fiber diet  -- repeat colonoscopy not required for routine screening    Discharge Disposition:  Home in the company of a  when able to ambulate.       Brooke Pisano MD

## 2021-10-04 NOTE — DISCHARGE INSTRUCTIONS
Aranza Dominguez  [id number]  1945    COLON DISCHARGE INSTRUCTIONS  Discomfort:  Redness at IV site- apply warm compress to area; if redness or soreness persist- contact your physician  There may be a slight amount of blood passed from the rectum  Gaseous discomfort- walking, belching will help relieve any discomfort  You may not operate a vehicle for 12 hours  You may not engage in an occupation involving machinery or appliances for rest of today  You may not drink alcoholic beverages for at least 12 hours  Avoid making any critical decisions for at least 24 hour  DIET:   High fiber diet. - however -  remember your colon is empty and a heavy meal will produce gas. Avoid these foods:  vegetables, fried / greasy foods, carbonated drinks for today  MEDICATION:  (See attached)     ACTIVITY:  You may not resume your normal daily activities until tomorrow AM; it is recommended that you spend the remainder of the day resting -  avoid any strenuous activity. CALL M.D. ANY SIGN OF:   Increasing pain, nausea, vomiting  Abdominal distension (swelling)  New increased bleeding (oral or rectal)  Fever (chills)  Pain in chest area  Bloody discharge from nose or mouth  Shortness of breath    IMPRESSION:  -- no polyps! !  -- diverticulosis, which is when small out-pouchings in the inner lining of the colon form, little stretched out pockets. This is very common, and a diet high in fiber with the addition of a daily fiber supplement (like 2 teaspoons of metamucil or psyllium husk fiber powder mixed in water) can help treat this! -- we saw some hemorrhoids, which are very common, and these are swollen veins at the anal canal or end of the rectum, which can bulge with constipation and straining or frequent bowel movements. Sometimes they cause bleeding, burning, pressure, or even pain.  A diet high in fiber helps, but using a daily fiber supplement (like 2 teaspoons of psyllium husk powder or metamucil) can help treat these.    Follow-up Instructions:   Call Dr. Jeff Malave for the results of procedure / biopsy in 7-10 days  Appointment in 4-6 [facility]  Telephone # [phone]  Repeat colonoscopy not required for routine screening    Darrian Timmons MD

## 2021-10-04 NOTE — H&P
Gastroenterology Outpatient History and Physical    Patient: Michael Ha    Physician: Shelia Romero MD    Chief Complaint: diarrhea  History of Present Illness: 76 yo F with diarrhea. History:  Past Medical History:   Diagnosis Date    Degenerative cervical spinal stenosis     Degenerative lumbar spinal stenosis     Hyperlipemia     Hypertension     Occipital neuralgia of right side     Thyroid disease     Ulceration of vulva 2008    Wound of left leg 2017      Past Surgical History:   Procedure Laterality Date    HX APPENDECTOMY      HX COLONOSCOPY      HX GYN      hysterectomy at age 28   [place] [facility] Left 2017    rotator cuff repair      Social History     Socioeconomic History    Marital status:      Spouse name: Not on file    Number of children: Not on file    Years of education: Not on file    Highest education level: Not on file   Tobacco Use    Smoking status: Former Smoker     Quit date: 1970     Years since quittin.2    Smokeless tobacco: Never Used   Vaping Use    Vaping Use: Never used   Substance and Sexual Activity    Alcohol use: Not Currently     Comment: very occasionally    Drug use: Never     Social Determinants of Health     Financial Resource Strain:     Difficulty of Paying Living Expenses:    Food Insecurity:     Worried About Running Out of Food in the Last Year:     Ran Out of Food in the Last Year:    Transportation Needs:     Lack of Transportation (Medical):      Lack of Transportation (Non-Medical):    Physical Activity:     Days of Exercise per Week:     Minutes of Exercise per Session:    Stress:     Feeling of Stress :    Social Connections:     Frequency of Communication with Friends and Family:     Frequency of Social Gatherings with Friends and Family:     Attends Hoahaoism Services:     Active Member of Clubs or Organizations:     Attends Club or Organization Meetings:     Marital Status:       Family History   Problem Relation Age of Onset    Dementia Mother     Breast Cancer Mother     Other Mother         POLIO AND COLITIS    COPD Father     Migraines Child     Allergic Rhinitis Child     Sleep Apnea Child       Patient Active Problem List   Diagnosis Code    Migraine with aura, not intractable, without status migrainosus G43. 109    Cervicogenic headache G44.86    Degenerative lumbar spinal stenosis M48.061    Cervical radiculopathy due to degenerative joint disease of spine M47.22    Hyperlipemia E78.5    Degenerative cervical spinal stenosis M48.02    Occipital neuralgia of right side M54.81    Thyroid disease E07.9    Actinic keratosis L57.0    Atopic rhinitis J30.9    Degeneration of lumbar or lumbosacral intervertebral disc M51.37    Diverticulosis of intestine K57.90    Family history of cardiovascular disease Z80.55    Family history of malignant neoplasm of breast Z80.3    History of colonic polyps Z86.010    Inflammation of sacroiliac joint (HCC) M46.1    Cervical spondylosis M47.812    Lumbar spondylosis M47.816    Malignant neoplasm of skin C44.90    Menopausal syndrome N95.1    Myofascial pain dysfunction syndrome M79.18    Other specified acquired hypothyroidism E03.8    Primary osteoarthritis of left knee M17.12    Shoulder impingement, right M75.41    Cervical radiculopathy M54.12    Lumbar radiculopathy M54.16    Spinal stenosis of cervical region M48.02       Allergies:    Allergies   Allergen Reactions    Cephalexin Other (comments)     Other reaction(s): Dermatological problems, e.g., rash, hives    Penicillins Rash     Other reaction(s): Dermatological problems, e.g., rash, hives  Other reaction(s): Dermatological problems, e.g., rash, hives      Aspirin Rash     Unable to take large dosages  Unable to take large dosages      Codeine Other (comments)     Rash, unable to take large dosages       Penicillamine Rash    Penicillamine Rash    Synthroid [Levothyroxine] Rash and Itching     To breast area    Levothyroxine Itching and Rash     To breast area     Medications:   Prior to Admission medications    Medication Sig Start Date End Date Taking? Authorizing Provider   dicyclomine (BENTYL) 10 mg capsule Take 10 mg by mouth every six (6) hours. Yes Provider, Historical   colesevelam (WELCHOL) 625 mg tablet TAKE 1-2 TABLETS BY MOUTH WITH MEAL THREE TIMES DAILY 7/23/21  Yes Provider, Historical   gabapentin (NEURONTIN) 100 mg capsule 1 to 2 tablets up to 4 times a day as needed for headache/migraine rescue  Indications: chronic headaches  Patient taking differently: every six (6) hours. 1 to 2 tablets  needed for headache/migraine rescue  Indications: chronic headaches 7/12/21  Yes Walter Jean Baptiste NP   B.infantis-B.ani-B.long-B.bifi (Probiotic 4X) 10-15 mg TbEC Take  by mouth. Yes Provider, Historical   butalbital-acetaminophen-caffeine (FIORICET, ESGIC) -40 mg per tablet Take 2 Tabs by mouth every eight (8) hours as needed for Headache. 4/5/21  Yes Walter Jean Baptiste NP   atorvastatin (LIPITOR) 10 mg tablet every evening. 6/30/20  Yes Provider, Historical   diclofenac EC (VOLTAREN) 75 mg EC tablet 75 mg two (2) times a day. 7/26/20  Yes Provider, Historical   conjugated estrogens (PREMARIN) 0.625 mg tablet Take  by mouth. Yes Provider, Historical   thyroid (ARMOUR) 90 mg tablet Take 1 Tab by mouth daily. Patient taking differently: Take 90 mg by mouth daily. None on weekends! 7/29/10  Yes Caleb Cui NP   diphenoxylate-atropine (LOMOTIL) 2.5-0.025 mg/5 mL liquid Take 5 mL by mouth. Patient not taking: Reported on 10/1/2021    Provider, Historical   thyroid, Pork, ([place] Thyroid) 90 mg tablet [place] Thyroid 90 mg tablet   Take 1 tablet every day by oral route. Patient not taking: Reported on 8/9/2021    Provider, Historical     Physical Exam:   Vital Signs: Blood pressure 139/63, pulse 76, resp.  rate 12, height 5' 5\" (1.651 m), weight 47.2 kg (104 lb), SpO2 99 %, not currently breastfeeding.   General: well developed, well nourished   HEENT: unremarkable   Heart: regular rhythm no mumur    Lungs: clear   Abdominal:  benign   Neurological: unremarkable   Extremities: no edema     Findings/Diagnosis: diarrhea  Plan of Care/Planned Procedure: Colonoscopy with conscious/deep sedation    Signed:  Kerwin Mejia MD 10/4/2021

## 2021-11-12 ENCOUNTER — PATIENT MESSAGE (OUTPATIENT)
Dept: NEUROLOGY | Age: 76
End: 2021-11-12

## 2021-11-12 DIAGNOSIS — G43.109 MIGRAINE WITH AURA, NOT INTRACTABLE, WITHOUT STATUS MIGRAINOSUS: ICD-10-CM

## 2021-11-12 DIAGNOSIS — G44.86 CERVICOGENIC HEADACHE: ICD-10-CM

## 2021-11-12 DIAGNOSIS — M48.061 DEGENERATIVE LUMBAR SPINAL STENOSIS: ICD-10-CM

## 2021-11-12 DIAGNOSIS — M47.22 CERVICAL RADICULOPATHY DUE TO DEGENERATIVE JOINT DISEASE OF SPINE: ICD-10-CM

## 2021-11-12 RX ORDER — BUTALBITAL, ACETAMINOPHEN AND CAFFEINE 50; 325; 40 MG/1; MG/1; MG/1
2 TABLET ORAL
Qty: 50 TABLET | Refills: 5 | Status: SHIPPED | OUTPATIENT
Start: 2021-11-12 | End: 2022-06-09

## 2021-11-12 NOTE — TELEPHONE ENCOUNTER
Channing Lopez 11/12/2021 3:17 PM EST      ----- Message -----  From: Carlos Crook  Sent: 11/12/2021 3:14 PM EST  To: [facility]  Subject: Prescription     Hi Frances Jewell,  I need a new prescription sent to [facility] for Butalbital.   Have been doing pretty good for the last several months, managing with Gabapentin during the day and 1/2 does of Butalbital at night. Was diagnosed with Cystitis in October and those meds have worked well. Have been under a lot of emotional stress for the last two weeks due to the loss of our son to Covid. Its hard to lose a child no matter the age.      Cathi Zarate

## 2021-12-21 ENCOUNTER — TELEPHONE (OUTPATIENT)
Dept: NEUROLOGY | Age: 76
End: 2021-12-21

## 2021-12-21 NOTE — TELEPHONE ENCOUNTER
ian requesting alternative as patient's butalbital has been denied. Covered alternative is   Ibuprophen, meloxicam, naproxen, naratriptanhcl, sumatriptansuccinate.   Please escribe alternative to Ian hilton

## 2021-12-22 NOTE — TELEPHONE ENCOUNTER
Called and confirmed patient id. Patient states that she has picked up the script and will use [facility] the next time but it was only 26 dollars. She was happy to get it.

## 2021-12-22 NOTE — TELEPHONE ENCOUNTER
Tell the patient her insurance is denying the butalbital so she can use [facility] and pay cash for it at her local pharmacy otherwise she is going to have to wait until we can get a prior authorization completed

## 2022-01-07 ENCOUNTER — OFFICE VISIT (OUTPATIENT)
Dept: ORTHOPEDIC SURGERY | Age: 77
End: 2022-01-07

## 2022-01-07 VITALS — BODY MASS INDEX: 17.33 KG/M2 | HEIGHT: 65 IN | WEIGHT: 104 LBS

## 2022-01-07 DIAGNOSIS — M25.511 ACUTE PAIN OF RIGHT SHOULDER: Primary | ICD-10-CM

## 2022-01-07 DIAGNOSIS — M75.121 NONTRAUMATIC COMPLETE TEAR OF RIGHT ROTATOR CUFF: ICD-10-CM

## 2022-01-07 DIAGNOSIS — S32.2XXA CLOSED FRACTURE OF COCCYX, INITIAL ENCOUNTER (HCC): ICD-10-CM

## 2022-01-07 DIAGNOSIS — M53.3 COCCYX PAIN: ICD-10-CM

## 2022-01-07 PROCEDURE — 1090F PRES/ABSN URINE INCON ASSESS: CPT | Performed by: ORTHOPAEDIC SURGERY

## 2022-01-07 PROCEDURE — 1101F PT FALLS ASSESS-DOCD LE1/YR: CPT | Performed by: ORTHOPAEDIC SURGERY

## 2022-01-07 PROCEDURE — G8427 DOCREV CUR MEDS BY ELIG CLIN: HCPCS | Performed by: ORTHOPAEDIC SURGERY

## 2022-01-07 PROCEDURE — G8432 DEP SCR NOT DOC, RNG: HCPCS | Performed by: ORTHOPAEDIC SURGERY

## 2022-01-07 PROCEDURE — G8536 NO DOC ELDER MAL SCRN: HCPCS | Performed by: ORTHOPAEDIC SURGERY

## 2022-01-07 PROCEDURE — G8419 CALC BMI OUT NRM PARAM NOF/U: HCPCS | Performed by: ORTHOPAEDIC SURGERY

## 2022-01-07 PROCEDURE — 27200 TREAT TAIL BONE FRACTURE: CPT | Performed by: ORTHOPAEDIC SURGERY

## 2022-01-07 PROCEDURE — G8400 PT W/DXA NO RESULTS DOC: HCPCS | Performed by: ORTHOPAEDIC SURGERY

## 2022-01-07 PROCEDURE — 3017F COLORECTAL CA SCREEN DOC REV: CPT | Performed by: ORTHOPAEDIC SURGERY

## 2022-01-07 PROCEDURE — 99214 OFFICE O/P EST MOD 30 MIN: CPT | Performed by: ORTHOPAEDIC SURGERY

## 2022-01-07 PROCEDURE — 20610 DRAIN/INJ JOINT/BURSA W/O US: CPT | Performed by: ORTHOPAEDIC SURGERY

## 2022-01-07 RX ORDER — BUDESONIDE 3 MG/1
CAPSULE, COATED PELLETS ORAL
COMMUNITY
Start: 2022-01-03

## 2022-01-07 RX ORDER — TRIAMCINOLONE ACETONIDE 40 MG/ML
40 INJECTION, SUSPENSION INTRA-ARTICULAR; INTRAMUSCULAR ONCE
Status: COMPLETED | OUTPATIENT
Start: 2022-01-07 | End: 2022-01-07

## 2022-01-07 RX ORDER — BUPIVACAINE HYDROCHLORIDE 2.5 MG/ML
6 INJECTION, SOLUTION INFILTRATION; PERINEURAL ONCE
Status: COMPLETED | OUTPATIENT
Start: 2022-01-07 | End: 2022-01-07

## 2022-01-07 RX ADMIN — BUPIVACAINE HYDROCHLORIDE 15 MG: 2.5 INJECTION, SOLUTION INFILTRATION; PERINEURAL at 14:03

## 2022-01-07 RX ADMIN — TRIAMCINOLONE ACETONIDE 40 MG: 40 INJECTION, SUSPENSION INTRA-ARTICULAR; INTRAMUSCULAR at 14:04

## 2022-01-07 NOTE — PROGRESS NOTES
Jalyn Villa (: 1945) is a 77 y.o. female, established patient, here for evaluation of the following chief complaint(s):  Shoulder Pain (right) and Tailbone Pain       ASSESSMENT/PLAN:  Below is the assessment and plan developed based on review of pertinent history, physical exam, labs, studies, and medications. She elected to try corticosteroid injection for the right shoulder today. We discussed the risks and benefits of injection and informed consent was obtained. After a sterile preparation, 6 cc of bupivicaine and 40 mg of Kenalog were injected into the right shoulder. The patient tolerated the procedure well and there were no complications. Post injection pain, blood sugar elevation, skin discoloration, fatty atrophy and the signs of infection were discussed in detail. The patient was instructed to contact us if there were any questions or concerns prior to their follow up appointment. We discussed conservative treatment of her coccyx fracture. She will take vitamin D and calcium supplement and she will use a donut pillow. 1. Acute pain of right shoulder  2. Coccyx pain  -     XR SACRUM AND COCCYX; Future  3. Nontraumatic complete tear of right rotator cuff  4. Closed fracture of coccyx, initial encounter ([facility] [address]      Return in about 3 months (around 2022), or if symptoms worsen or fail to improve. SUBJECTIVE/OBJECTIVE:  Jalyn Villa (: 1945) is a 77 y.o. female. She notes a fall that occurred recently and she landed straight backwards. She describes coccygeal pain. She denies any radicular symptoms or bowel or bladder symptoms. She does note continued chronic right shoulder pain that responds well to corticosteroid injection. She works with therapy for range of motion of the shoulder.         Allergies   Allergen Reactions    Cephalexin Other (comments)     Other reaction(s): Dermatological problems, e.g., rash, hives    Penicillins Rash     Other reaction(s): Dermatological problems, e.g., rash, hives  Other reaction(s): Dermatological problems, e.g., rash, hives      Aspirin Rash     Unable to take large dosages  Unable to take large dosages      Codeine Other (comments)     Rash, unable to take large dosages       Penicillamine Rash    Penicillamine Rash    Synthroid [Levothyroxine] Rash and Itching     To breast area    Levothyroxine Itching and Rash     To breast area       Current Outpatient Medications   Medication Sig    budesonide (ENTOCORT EC) 3 mg capsule TAKE 3 CAPSULES BY MOUTH DAILY    butalbital-acetaminophen-caffeine (FIORICET, ESGIC) -40 mg per tablet Take 2 Tablets by mouth every eight (8) hours as needed for Headache.  diphenoxylate-atropine (LOMOTIL) 2.5-0.025 mg/5 mL liquid Take 5 mL by mouth.  gabapentin (NEURONTIN) 100 mg capsule 1 to 2 tablets up to 4 times a day as needed for headache/migraine rescue  Indications: chronic headaches (Patient taking differently: every six (6) hours. 1 to 2 tablets  needed for headache/migraine rescue  Indications: chronic headaches)    B.infantis-B.ani-B.long-B.bifi (Probiotic 4X) 10-15 mg TbEC Take  by mouth.  thyroid, Pork, ([place] Thyroid) 90 mg tablet [place] Thyroid 90 mg tablet   Take 1 tablet every day by oral route.  atorvastatin (LIPITOR) 10 mg tablet every evening.  diclofenac EC (VOLTAREN) 75 mg EC tablet 75 mg two (2) times a day.  conjugated estrogens (PREMARIN) 0.625 mg tablet Take  by mouth.      Current Facility-Administered Medications   Medication    bupivacaine HCl (MARCAINE) 0.25 % (2.5 mg/mL) injection 15 mg    triamcinolone acetonide (KENALOG-40) 40 mg/mL injection 40 mg       Social History     Socioeconomic History    Marital status:      Spouse name: Not on file    Number of children: Not on file    Years of education: Not on file    Highest education level: Not on file   Occupational History    Not on file   Tobacco Use    Smoking status: Former Smoker     Packs/day: 1.00     Years: 8.00     Pack years: 8.00     Quit date: 1970     Years since quittin.4    Smokeless tobacco: Never Used   Vaping Use    Vaping Use: Never used   Substance and Sexual Activity    Alcohol use: Not Currently     Alcohol/week: 0.0 standard drinks     Comment: very occasionally    Drug use: Never    Sexual activity: Yes     Partners: Male     Birth control/protection: None   Other Topics Concern    Not on file   Social History Narrative    Not on file     Social Determinants of Health     Financial Resource Strain:     Difficulty of Paying Living Expenses: Not on file   Food Insecurity:     Worried About Running Out of Food in the Last Year: Not on file    Ruperto of Food in the Last Year: Not on file   Transportation Needs:     Lack of Transportation (Medical): Not on file    Lack of Transportation (Non-Medical):  Not on file   Physical Activity:     Days of Exercise per Week: Not on file    Minutes of Exercise per Session: Not on file   Stress:     Feeling of Stress : Not on file   Social Connections:     Frequency of Communication with Friends and Family: Not on file    Frequency of Social Gatherings with Friends and Family: Not on file    Attends Mosque Services: Not on file    Active Member of [address] [place] [address] [employer] or Organizations: Not on file    Attends Club or Organization Meetings: Not on file    Marital Status: Not on file   Intimate Partner Violence:     Fear of Current or Ex-Partner: Not on file    Emotionally Abused: Not on file    Physically Abused: Not on file    Sexually Abused: Not on file   Housing Stability:     Unable to Pay for Housing in the Last Year: Not on file    Number of [place] in the Last Year: Not on file    Unstable Housing in the Last Year: Not on file       Past Surgical History:   Procedure Laterality Date    COLONOSCOPY N/A 10/4/2021    COLONOSCOPY WITH BIOPSY performed by Lanre Marquez MD at [facility]COPY    COLONOSCOPY,DIAGNOSTIC  10/4/2021         HX ANKLE FRACTURE TX      HX APPENDECTOMY      HX COLONOSCOPY      HX GYN      hysterectomy at age 28   [de-identified] ORTHOPAEDIC Left 2017    rotator cuff repair       Family History   Problem Relation Age of Onset    Dementia Mother     Breast Cancer Mother     Other Mother         POLIO AND COLITIS    Cancer Mother         Breast cancer, alzheimers    COPD Father     Migraines Child     Allergic Rhinitis Child     Sleep Apnea Child         OB History    No obstetric history on file. REVIEW OF SYSTEMS:  ROS     Positive for: Musculoskeletal    Last edited by Compa Lau on 1/7/2022  1:37 PM. (History)        Patient denies any recent fever, chills, nausea, vomiting, chest pain, or shortness of breath. Vitals:  Ht 5' 5\" (1.651 m)   Wt 104 lb (47.2 kg)   BMI 17.31 kg/m²    Body mass index is 17.31 kg/m². PHYSICAL EXAM:  General exam: Patient is awake, alert, and oriented x3. Well-appearing. No acute distress. Ambulates with an antalgic gait      Right shoulder: Neurovascular and sensory intact. There is tenderness to palpation at the anterior lateral shoulder. There is limited passive and active overhead range of motion. Pain is noted with impingement testing including Ojeda exam.  Pain and weakness 4/5 is noted with rotator cuff strength testing including resisted abduction and resisted external rotation. Normal stability. There is some tenderness palpation at the [facility] joint and pain with crossarm exam.    Pelvis: There is tenderness to palpation along the sacrum/coccyx. She has pain in this area when sitting. Negative straight leg raise exam.  No significant pain with range of motion of the hips. IMAGING:    XR Results (most recent):  Results from Hospital Encounter encounter on 09/24/20    XR SPINE CERV W OBL/FLEX/EXT MIN 6 V COMP    Narrative  Clinical indication: Migraine with neck pain. Chronic.  Cervical radiculopathy. Lateral neutral flexion-extension, AP, both oblique and odontoid views of the  cervical spine. There is no fracture or focal lytic lesion. There are disc degeneration and spondylosis seen at C4-5 and C6-C7. Small  anterior subluxation approximately 2 mm seen on flexion at C3-C4. Reduction seen  in extension. Mild foraminal narrowing seen on the right at 6 C7. Impression  IMPRESSION:  Degenerative disc and joint disease as detailed above. Orders Placed This Encounter    XR SACRUM AND COCCYX     Standing Status:   Future     Number of Occurrences:   1     Standing Expiration Date:   1/8/2023    bupivacaine HCl (MARCAINE) 0.25 % (2.5 mg/mL) injection 15 mg    triamcinolone acetonide (KENALOG-40) 40 mg/mL injection 40 mg              An electronic signature was used to authenticate this note.   -- Raina Vidal, DO

## 2022-01-07 NOTE — LETTER
1/7/2022    Patient: Thee Frias   YOB: 1945   Date of Visit: 1/7/2022     Charla Rodgers MD  [facility]  [facility] [address]  [address] [id number]  Via Fax: [phone]    Dear Charla Rodgers MD,      Thank you for referring Ms. Romulo Munguia to [facility] for evaluation. My notes for this consultation are attached. If you have questions, please do not hesitate to call me. I look forward to following your patient along with you.       Sincerely,    Irvin Wu, DO

## 2022-01-12 ENCOUNTER — OFFICE VISIT (OUTPATIENT)
Dept: ORTHOPEDIC SURGERY | Age: 77
End: 2022-01-12
Payer: MEDICARE

## 2022-01-12 VITALS — BODY MASS INDEX: 18.99 KG/M2 | WEIGHT: 114 LBS | HEIGHT: 65 IN

## 2022-01-12 DIAGNOSIS — M22.42 CHONDROMALACIA OF BOTH PATELLAE: Primary | ICD-10-CM

## 2022-01-12 DIAGNOSIS — M17.0 BILATERAL PRIMARY OSTEOARTHRITIS OF KNEE: ICD-10-CM

## 2022-01-12 DIAGNOSIS — M22.41 CHONDROMALACIA OF BOTH PATELLAE: Primary | ICD-10-CM

## 2022-01-12 PROCEDURE — 1101F PT FALLS ASSESS-DOCD LE1/YR: CPT | Performed by: ORTHOPAEDIC SURGERY

## 2022-01-12 PROCEDURE — G8432 DEP SCR NOT DOC, RNG: HCPCS | Performed by: ORTHOPAEDIC SURGERY

## 2022-01-12 PROCEDURE — G8427 DOCREV CUR MEDS BY ELIG CLIN: HCPCS | Performed by: ORTHOPAEDIC SURGERY

## 2022-01-12 PROCEDURE — 1090F PRES/ABSN URINE INCON ASSESS: CPT | Performed by: ORTHOPAEDIC SURGERY

## 2022-01-12 PROCEDURE — 99214 OFFICE O/P EST MOD 30 MIN: CPT | Performed by: ORTHOPAEDIC SURGERY

## 2022-01-12 PROCEDURE — G8400 PT W/DXA NO RESULTS DOC: HCPCS | Performed by: ORTHOPAEDIC SURGERY

## 2022-01-12 PROCEDURE — G8420 CALC BMI NORM PARAMETERS: HCPCS | Performed by: ORTHOPAEDIC SURGERY

## 2022-01-12 PROCEDURE — 20610 DRAIN/INJ JOINT/BURSA W/O US: CPT | Performed by: ORTHOPAEDIC SURGERY

## 2022-01-12 PROCEDURE — G8536 NO DOC ELDER MAL SCRN: HCPCS | Performed by: ORTHOPAEDIC SURGERY

## 2022-01-12 PROCEDURE — 3017F COLORECTAL CA SCREEN DOC REV: CPT | Performed by: ORTHOPAEDIC SURGERY

## 2022-01-12 RX ORDER — BACLOFEN 10 MG/1
10 TABLET ORAL 2 TIMES DAILY
Qty: 60 TABLET | Refills: 1 | Status: SHIPPED | OUTPATIENT
Start: 2022-01-12 | End: 2022-07-26

## 2022-01-12 RX ORDER — BUPIVACAINE HYDROCHLORIDE 2.5 MG/ML
6 INJECTION, SOLUTION INFILTRATION; PERINEURAL ONCE
Status: COMPLETED | OUTPATIENT
Start: 2022-01-12 | End: 2022-01-12

## 2022-01-12 RX ORDER — TRIAMCINOLONE ACETONIDE 40 MG/ML
40 INJECTION, SUSPENSION INTRA-ARTICULAR; INTRAMUSCULAR ONCE
Status: COMPLETED | OUTPATIENT
Start: 2022-01-12 | End: 2022-01-12

## 2022-01-12 RX ADMIN — BUPIVACAINE HYDROCHLORIDE 15 MG: 2.5 INJECTION, SOLUTION INFILTRATION; PERINEURAL at 10:11

## 2022-01-12 RX ADMIN — TRIAMCINOLONE ACETONIDE 40 MG: 40 INJECTION, SUSPENSION INTRA-ARTICULAR; INTRAMUSCULAR at 10:12

## 2022-01-12 RX ADMIN — TRIAMCINOLONE ACETONIDE 40 MG: 40 INJECTION, SUSPENSION INTRA-ARTICULAR; INTRAMUSCULAR at 10:11

## 2022-01-12 NOTE — LETTER
1/12/2022    Patient: Bindu Gibbons   YOB: 1945   Date of Visit: 1/12/2022     Tomás Prince MD  [facility]  [facility]  [address] [address]  Via Fax: [phone]    Dear Tomás Prince MD,      Thank you for referring Ms. Oliverio Lund to [facility] for evaluation. My notes for this consultation are attached. If you have questions, please do not hesitate to call me. I look forward to following your patient along with you.       Sincerely,    Luz Marina Saleh, DO

## 2022-01-12 NOTE — PROGRESS NOTES
Nikko Casillas (: 1945) is a 77 y.o. female, established patient, here for evaluation of the following chief complaint(s):  Knee Pain (bilateral)       ASSESSMENT/PLAN:  Below is the assessment and plan developed based on review of pertinent history, physical exam, labs, studies, and medications. He will call us to let us know the baclofen dosage that works for her for muscle spasms. We will provide this prescription. However, for the knees today she elected to try corticosteroid injections. We discussed the risks and benefits of injection and informed consent was obtained. After a sterile preparation, 6 cc of bupivicaine and 40 mg of Kenalog were injected into the bilateral knees. The patient tolerated the procedure well and there were no complications. Post injection pain, blood sugar elevation, skin discoloration, fatty atrophy and the signs of infection were discussed in detail. The patient was instructed to contact us if there were any questions or concerns prior to their follow up appointment. 1. Chondromalacia of both patellae  2. Bilateral primary osteoarthritis of knee      Return in about 3 months (around 2022), or if symptoms worsen or fail to improve. SUBJECTIVE/OBJECTIVE:  Nikko Casillas (: 1945) is a 77 y.o. female. She notes aching pain and clicking in both knees. Overall her shoulders have been feeling good after recent corticosteroid injection. She normally takes baclofen as a muscle relaxer which works better than other muscle laxer medications.         Allergies   Allergen Reactions    Cephalexin Other (comments)     Other reaction(s): Dermatological problems, e.g., rash, hives    Penicillins Rash     Other reaction(s): Dermatological problems, e.g., rash, hives  Other reaction(s): Dermatological problems, e.g., rash, hives      Aspirin Rash     Unable to take large dosages  Unable to take large dosages      Codeine Other (comments)     Rash, unable to take large dosages       Penicillamine Rash    Penicillamine Rash    Synthroid [Levothyroxine] Rash and Itching     To breast area    Levothyroxine Itching and Rash     To breast area       Current Outpatient Medications   Medication Sig    budesonide (ENTOCORT EC) 3 mg capsule TAKE 3 CAPSULES BY MOUTH DAILY    butalbital-acetaminophen-caffeine (FIORICET, ESGIC) -40 mg per tablet Take 2 Tablets by mouth every eight (8) hours as needed for Headache.  gabapentin (NEURONTIN) 100 mg capsule 1 to 2 tablets up to 4 times a day as needed for headache/migraine rescue  Indications: chronic headaches (Patient taking differently: every six (6) hours. 1 to 2 tablets  needed for headache/migraine rescue  Indications: chronic headaches)    B.infantis-B.ani-B.long-B.bifi (Probiotic 4X) 10-15 mg TbEC Take  by mouth.  thyroid, Pork, ([place] Thyroid) 90 mg tablet [place] Thyroid 90 mg tablet   Take 1 tablet every day by oral route.  atorvastatin (LIPITOR) 10 mg tablet every evening.  diclofenac EC (VOLTAREN) 75 mg EC tablet 75 mg two (2) times a day.  conjugated estrogens (PREMARIN) 0.625 mg tablet Take  by mouth.  diphenoxylate-atropine (LOMOTIL) 2.5-0.025 mg/5 mL liquid Take 5 mL by mouth.  (Patient not taking: Reported on 1/12/2022)     Current Facility-Administered Medications   Medication    bupivacaine HCl (MARCAINE) 0.25 % (2.5 mg/mL) injection 15 mg    triamcinolone acetonide (KENALOG-40) 40 mg/mL injection 40 mg    bupivacaine HCl (MARCAINE) 0.25 % (2.5 mg/mL) injection 15 mg    triamcinolone acetonide (KENALOG-40) 40 mg/mL injection 40 mg       Social History     Socioeconomic History    Marital status:      Spouse name: Not on file    Number of children: Not on file    Years of education: Not on file    Highest education level: Not on file   Occupational History    Not on file   Tobacco Use    Smoking status: Former Smoker     Packs/day: 1.00     Years: 8.00     Pack years: 8.00     Quit date: 1970     Years since quittin.4    Smokeless tobacco: Never Used   Vaping Use    Vaping Use: Never used   Substance and Sexual Activity    Alcohol use: Not Currently     Alcohol/week: 0.0 standard drinks     Comment: very occasionally    Drug use: Never    Sexual activity: Yes     Partners: Male     Birth control/protection: None   Other Topics Concern    Not on file   Social History Narrative    Not on file     Social Determinants of Health     Financial Resource Strain:     Difficulty of Paying Living Expenses: Not on file   Food Insecurity:     Worried About Running Out of Food in the Last Year: Not on file    Ruperto of Food in the Last Year: Not on file   Transportation Needs:     Lack of Transportation (Medical): Not on file    Lack of Transportation (Non-Medical):  Not on file   Physical Activity:     Days of Exercise per Week: Not on file    Minutes of Exercise per Session: Not on file   Stress:     Feeling of Stress : Not on file   Social Connections:     Frequency of Communication with Friends and Family: Not on file    Frequency of Social Gatherings with Friends and Family: Not on file    Attends Caodaism Services: Not on file    Active Member of [address] [place] [address] [employer] or Organizations: Not on file    Attends Club or Organization Meetings: Not on file    Marital Status: Not on file   Intimate Partner Violence:     Fear of Current or Ex-Partner: Not on file    Emotionally Abused: Not on file    Physically Abused: Not on file    Sexually Abused: Not on file   Housing Stability:     Unable to Pay for Housing in the Last Year: Not on file    [facility]: Not on file    Unstable Housing in the Last Year: Not on file       Past Surgical History:   Procedure Laterality Date    COLONOSCOPY N/A 10/4/2021    COLONOSCOPY WITH BIOPSY performed [facility] at [facility]  10/4/2021         [address] [place] [address]  HX APPENDECTOMY      HX COLONOSCOPY      HX GYN      hysterectomy at age 28   [facility] Left 2017    rotator cuff repair       Family History   Problem Relation Age of Onset    Dementia Mother     Breast Cancer Mother     Other Mother         POLIO AND COLITIS    Cancer Mother         Breast cancer, alzheimers    COPD Father     Migraines Child     Allergic Rhinitis Child     Sleep Apnea Child         OB History    No obstetric history on file. REVIEW OF SYSTEMS:  ROS     Positive for: Musculoskeletal    Last edited by Henrik Xiao on 1/12/2022  9:41 AM. (History)        Patient denies any recent fever, chills, nausea, vomiting, chest pain, or shortness of breath. Vitals:  Ht 5' 5\" (1.651 m)   Wt 114 lb (51.7 kg)   BMI 18.97 kg/m²    Body mass index is 18.97 kg/m². PHYSICAL EXAM:  General exam: Patient is awake, alert, and oriented x3. Well-appearing. No acute distress. Ambulates with an antalgic gait    Bilateral knees: Neurovascular and sensory intact. Effusion is present. Crepitus is noted with range of motion of both knees. There is tenderness palpation at the medial and lateral joint lines. Eda's maneuver creates mild pain. Normal stability on ligamentous testing including Lachman's exam.  Stable anterior and posterior drawer. No erythema or ecchymosis. IMAGING:    XR Results (most recent):  Results from Appointment encounter on 01/07/22    XR SACRUM AND COCCYX    Narrative  X-rays 3 views of the sacrum done today show evidence of a nondisplaced fracture at the distal sacrum/coccyx. Results from Hospital Encounter encounter on 09/24/20    XR SPINE CERV W OBL/FLEX/EXT MIN 6 V COMP    Narrative  Clinical indication: Migraine with neck pain. Chronic. Cervical radiculopathy. Lateral neutral flexion-extension, AP, both oblique and odontoid views of the  cervical spine. There is no fracture or focal lytic lesion.   There are disc degeneration and spondylosis seen at C4-5 and C6-C7. Small  anterior subluxation approximately 2 mm seen on flexion at C3-C4. Reduction seen  in extension. Mild foraminal narrowing seen on the right at 6 C7. Impression  IMPRESSION:  Degenerative disc and joint disease as detailed above. Orders Placed This Encounter    bupivacaine HCl (MARCAINE) 0.25 % (2.5 mg/mL) injection 15 mg    triamcinolone acetonide (KENALOG-40) 40 mg/mL injection 40 mg    bupivacaine HCl (MARCAINE) 0.25 % (2.5 mg/mL) injection 15 mg    triamcinolone acetonide (KENALOG-40) 40 mg/mL injection 40 mg              An electronic signature was used to authenticate this note.   -- Yuliana Larios, DO

## 2022-03-06 DIAGNOSIS — M54.81 OCCIPITAL NEURALGIA OF RIGHT SIDE: ICD-10-CM

## 2022-03-06 DIAGNOSIS — G44.86 CERVICOGENIC HEADACHE: ICD-10-CM

## 2022-03-07 RX ORDER — GABAPENTIN 100 MG/1
CAPSULE ORAL
Qty: 240 CAPSULE | Refills: 2 | Status: SHIPPED | OUTPATIENT
Start: 2022-03-07 | End: 2022-09-19

## 2022-03-18 ENCOUNTER — OFFICE VISIT (OUTPATIENT)
Dept: ORTHOPEDIC SURGERY | Age: 77
End: 2022-03-18
Payer: MEDICARE

## 2022-03-18 VITALS — WEIGHT: 114 LBS | BODY MASS INDEX: 18.99 KG/M2 | HEIGHT: 65 IN

## 2022-03-18 DIAGNOSIS — M75.121 NONTRAUMATIC COMPLETE TEAR OF RIGHT ROTATOR CUFF: ICD-10-CM

## 2022-03-18 DIAGNOSIS — M25.511 ACUTE PAIN OF RIGHT SHOULDER: ICD-10-CM

## 2022-03-18 DIAGNOSIS — M75.101 RIGHT ROTATOR CUFF TEAR ARTHROPATHY: ICD-10-CM

## 2022-03-18 DIAGNOSIS — M12.811 RIGHT ROTATOR CUFF TEAR ARTHROPATHY: ICD-10-CM

## 2022-03-18 DIAGNOSIS — M54.12 CERVICAL RADICULOPATHY: Primary | ICD-10-CM

## 2022-03-18 PROBLEM — G43.109 MIGRAINE WITH AURA, NOT INTRACTABLE, WITHOUT STATUS MIGRAINOSUS: Status: ACTIVE | Noted: 2020-09-24

## 2022-03-18 PROCEDURE — 20610 DRAIN/INJ JOINT/BURSA W/O US: CPT | Performed by: ORTHOPAEDIC SURGERY

## 2022-03-18 RX ORDER — METHYLPREDNISOLONE ACETATE 80 MG/ML
40 INJECTION, SUSPENSION INTRA-ARTICULAR; INTRALESIONAL; INTRAMUSCULAR; SOFT TISSUE ONCE
Status: COMPLETED | OUTPATIENT
Start: 2022-03-18 | End: 2022-03-18

## 2022-03-18 RX ORDER — BUPIVACAINE HYDROCHLORIDE 2.5 MG/ML
6 INJECTION, SOLUTION INFILTRATION; PERINEURAL ONCE
Status: COMPLETED | OUTPATIENT
Start: 2022-03-18 | End: 2022-03-18

## 2022-03-18 RX ORDER — BUPIVACAINE HYDROCHLORIDE 2.5 MG/ML
1 INJECTION, SOLUTION INFILTRATION; PERINEURAL ONCE
Status: COMPLETED | OUTPATIENT
Start: 2022-03-18 | End: 2022-03-18

## 2022-03-18 RX ORDER — METHYLPREDNISOLONE ACETATE 80 MG/ML
80 INJECTION, SUSPENSION INTRA-ARTICULAR; INTRALESIONAL; INTRAMUSCULAR; SOFT TISSUE ONCE
Status: COMPLETED | OUTPATIENT
Start: 2022-03-18 | End: 2022-03-18

## 2022-03-18 RX ADMIN — BUPIVACAINE HYDROCHLORIDE 2.5 MG: 2.5 INJECTION, SOLUTION INFILTRATION; PERINEURAL at 12:09

## 2022-03-18 RX ADMIN — METHYLPREDNISOLONE ACETATE 80 MG: 80 INJECTION, SUSPENSION INTRA-ARTICULAR; INTRALESIONAL; INTRAMUSCULAR; SOFT TISSUE at 12:09

## 2022-03-18 RX ADMIN — METHYLPREDNISOLONE ACETATE 40 MG: 80 INJECTION, SUSPENSION INTRA-ARTICULAR; INTRALESIONAL; INTRAMUSCULAR; SOFT TISSUE at 12:09

## 2022-03-18 RX ADMIN — BUPIVACAINE HYDROCHLORIDE 15 MG: 2.5 INJECTION, SOLUTION INFILTRATION; PERINEURAL at 12:09

## 2022-03-18 NOTE — PROGRESS NOTES
Nikko Casillas (: 1945) is a 69 y.o. female, established patient, here for evaluation of the following chief complaint(s):  Shoulder Pain (right)       ASSESSMENT/PLAN:  Below is the assessment and plan developed based on review of pertinent history, physical exam, labs, studies, and medications. Her symptoms seem to be most consistent with cervical radiculopathy. However, she does have some true right shoulder pain as well. We have discussed treatment options and she would like to try trigger point injection for the right parascapular region today and corticosteroid injection for the left knee. She also inquired about possible surgical treatment for the right shoulder in the future. Based on her previous MRI showing evidence of significant rotator cuff tears with retraction and early chondral changes I believe that a reverse shoulder replacement would be her best option in the future. We discussed the risks and benefits of injection and informed consent was obtained. After a sterile preparation, 6 cc of bupivicaine and 80 mg of Depo-Medrol were injected into the left knee. Right shoulder trigger point injection was performed under same technique with 1 cc of bupivacaine and 40 mg of Depo-Medrol. The patient tolerated the procedure well and there were no complications. Post injection pain, blood sugar elevation, skin discoloration, fatty atrophy and the signs of infection were discussed in detail. The patient was instructed to contact us if there were any questions or concerns prior to their follow up appointment. 1. Cervical radiculopathy  2. 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 Acute pain of right shoulder      No follow-ups on file. SUBJECTIVE/OBJECTIVE:  Nikko Casillas (: 1945) is a 69 y.o. female. She notes continued aching pain in the right shoulder.   Corticosteroid injection helped her shoulder specific symptoms but she notes radiating pain down the right upper extremity and specifically a painful nodule in the parascapular region. Allergies   Allergen Reactions    Cephalexin Other (comments)     Other reaction(s): Dermatological problems, e.g., rash, hives    Penicillins Rash     Other reaction(s): Dermatological problems, e.g., rash, hives  Other reaction(s): Dermatological problems, e.g., rash, hives      Aspirin Rash     Unable to take large dosages  Unable to take large dosages      Codeine Other (comments)     Rash, unable to take large dosages       Penicillamine Rash    Penicillamine Rash    Synthroid [Levothyroxine] Rash and Itching     To breast area    Levothyroxine Itching and Rash     To breast area       Current Outpatient Medications   Medication Sig    gabapentin (NEURONTIN) 100 mg capsule TAKE 1 TO 2 CAPSULES BY MOUTH UP TO 4 TIMES A DAY AS NEEDED FOR HEADACHE MIGRAINES    baclofen (LIORESAL) 10 mg tablet Take 1 Tablet by mouth two (2) times a day.  budesonide (ENTOCORT EC) 3 mg capsule TAKE 3 CAPSULES BY MOUTH DAILY    butalbital-acetaminophen-caffeine (FIORICET, ESGIC) -40 mg per tablet Take 2 Tablets by mouth every eight (8) hours as needed for Headache.  diphenoxylate-atropine (LOMOTIL) 2.5-0.025 mg/5 mL liquid Take 5 mL by mouth. (Patient not taking: Reported on 1/12/2022)    B.infantis-B.ani-B.long-B.bifi (Probiotic 4X) 10-15 mg TbEC Take  by mouth.  thyroid, Pork, ([place] Thyroid) 90 mg tablet [place] Thyroid 90 mg tablet   Take 1 tablet every day by oral route.  atorvastatin (LIPITOR) 10 mg tablet every evening.  diclofenac EC (VOLTAREN) 75 mg EC tablet 75 mg two (2) times a day.  conjugated estrogens (PREMARIN) 0.625 mg tablet Take  by mouth. No current facility-administered medications for this visit.        Social History     Socioeconomic History    Marital status:      Spouse name: Not on file    Number of children: Not on file    Years of education: Not on file    Highest education level: Not on file   Occupational History    Not on file   Tobacco Use    Smoking status: Former Smoker     Packs/day: 1.00     Years: 8.00     Pack years: 8.00     Quit date: 1970     Years since quittin.6    Smokeless tobacco: Never Used   Vaping Use    Vaping Use: Never used   Substance and Sexual Activity    Alcohol use: Not Currently     Alcohol/week: 0.0 standard drinks     Comment: very occasionally    Drug use: Never    Sexual activity: Yes     Partners: Male     Birth control/protection: None   Other Topics Concern    Not on file   Social History Narrative    Not on file     Social Determinants of Health     Financial Resource Strain:     Difficulty of Paying Living Expenses: Not on file   Food Insecurity:     Worried About Running Out of Food in the Last Year: Not on file    Ruperto of Food in the Last Year: Not on file   Transportation Needs:     Lack of Transportation (Medical): Not on file    Lack of Transportation (Non-Medical):  Not on file   Physical Activity:     Days of Exercise per Week: Not on file    Minutes of Exercise per Session: Not on file   Stress:     Feeling of Stress : Not on file   Social Connections:     Frequency of Communication with Friends and Family: Not on file    Frequency of Social Gatherings with Friends and Family: Not on file    Attends Hoah[employer]ism Services: Not on file    Active Member of [address] [place] [address] or Organizations: Not on file    Attends Club or Organization Meetings: Not on file    Marital Status: Not on file   Intimate Partner Violence:     Fear of Current or Ex-Partner: Not on file    Emotionally Abused: Not on file    Physically Abused: Not on file    Sexually Abused: Not on file   Housing Stability:     Unable to Pay for Housing in the Last Year: Not on file    Number of [place] in the Last Year: Not on file    Unstable Housing in the Last Year: Not on file       Past Surgical History:   Procedure Laterality Date    COLONOSCOPY N/A 10/4/2021    COLONOSCOPY WITH BIOPSY [facility]  10/4/2021         HX ANKLE FRACTURE [address]      HX APPENDECTOMY      HX COLONOSCOPY      HX GYN      hysterectomy at age 28   Reford Joao [facility] Left 2017    rotator cuff repair       Family History   Problem Relation Age of Onset    Dementia Mother     Breast Cancer Mother     Other Mother         POLIO AND COLITIS    Cancer Mother         Breast cancer, alzheimers    COPD Father     Migraines Child     Allergic Rhinitis Child     Sleep Apnea Child         OB History    No obstetric history on file. REVIEW OF SYSTEMS:  ROS     Positive for: Musculoskeletal    Last edited by Shyla Hartley on 3/18/2022 10:23 AM. (History)        Patient denies any recent fever, chills, nausea, vomiting, chest pain, or shortness of breath. Vitals:  Ht 5' 5\" (1.651 m)   Wt 114 lb (51.7 kg)   BMI 18.97 kg/m²    Body mass index is 18.97 kg/m². PHYSICAL EXAM:  General exam: Patient is awake, alert, and oriented x3. Well-appearing. No acute distress. Ambulates with a normal gait. Neck: There is tenderness to palpation in the paraspinal region. No obvious midline tenderness to palpation. There is stiffness with flexion and extension of the cervical spine. Negative Spurling's exam.  No erythema or ecchymosis. Right shoulder: Neurovascular and sensory intact. There is decreased range of motion in all planes on active and passive exam.  There is tender trigger point noted in the supraspinatus musculature. There is crepitus with range of motion of the shoulder. There is pain with impingement testing including Ojeda exam.  There is weakness noted with resisted abduction and resisted external rotation on exam.  Normal stability is noted.           IMAGING:  MRI Results (most recent):  Results from [place] encounter on 10/09/20    MRI BRAIN WO CONT    Narrative  EXAM: MRI BRAIN WO CONT    INDICATION: headache severe    COMPARISON: None. CONTRAST: None. TECHNIQUE:  Multiplanar multisequence acquisition without contrast of the brain. FINDINGS:  The ventricles are normal in size and position. The brain parenchyma has normal  signal characteristics. There is no acute infarct, hemorrhage, extra-axial fluid  collection, or mass effect. There is no cerebellar tonsillar herniation. Expected arterial flow-voids are present. The paranasal sinuses, mastoid air cells, and middle ears are clear. The orbital  contents are within normal limits. No significant osseous or scalp lesions are  identified. Impression  IMPRESSION:    1. Normal brain MRI. Previous MRI of the right shoulder shows evidence of tearing of the supraspinatus and infraspinatus with significant retraction. Early chondral changes are noted at the glenohumeral joint. XR Results (most recent):  Results from Appointment encounter on 01/07/22    XR SACRUM AND COCCYX    Narrative  X-rays 3 views of the sacrum done today show evidence of a nondisplaced fracture at the distal sacrum/coccyx. Results from Hospital Encounter encounter on 09/24/20    XR SPINE CERV W OBL/FLEX/EXT MIN 6 V COMP    Narrative  Clinical indication: Migraine with neck pain. Chronic. Cervical radiculopathy. Lateral neutral flexion-extension, AP, both oblique and odontoid views of the  cervical spine. There is no fracture or focal lytic lesion. There are disc degeneration and spondylosis seen at C4-5 and C6-C7. Small  anterior subluxation approximately 2 mm seen on flexion at C3-C4. Reduction seen  in extension. Mild foraminal narrowing seen on the right at 6 C7. Impression  IMPRESSION:  Degenerative disc and joint disease as detailed above. No orders of the defined types were placed in this encounter. An electronic signature was used to authenticate this note.   -- Jesús De La Rosa DO

## 2022-03-19 PROBLEM — G44.86 CERVICOGENIC HEADACHE: Status: ACTIVE | Noted: 2020-09-24

## 2022-03-19 PROBLEM — M25.811 SHOULDER IMPINGEMENT, RIGHT: Status: ACTIVE | Noted: 2020-04-06

## 2022-03-19 PROBLEM — M47.22 CERVICAL RADICULOPATHY DUE TO DEGENERATIVE JOINT DISEASE OF SPINE: Status: ACTIVE | Noted: 2020-09-24

## 2022-03-19 PROBLEM — M47.812 CERVICAL SPONDYLOSIS: Status: ACTIVE | Noted: 2017-05-02

## 2022-03-19 PROBLEM — M48.02 SPINAL STENOSIS OF CERVICAL REGION: Status: ACTIVE | Noted: 2017-07-11

## 2022-03-19 PROBLEM — M79.18 MYOFASCIAL PAIN DYSFUNCTION SYNDROME: Status: ACTIVE | Noted: 2017-05-02

## 2022-03-19 PROBLEM — M54.16 LUMBAR RADICULOPATHY: Status: ACTIVE | Noted: 2020-08-18

## 2022-03-20 PROBLEM — M48.061 DEGENERATIVE LUMBAR SPINAL STENOSIS: Status: ACTIVE | Noted: 2020-09-24

## 2022-05-11 ENCOUNTER — OFFICE VISIT (OUTPATIENT)
Dept: ORTHOPEDIC SURGERY | Age: 77
End: 2022-05-11
Payer: MEDICARE

## 2022-05-11 VITALS — WEIGHT: 114 LBS | BODY MASS INDEX: 18.99 KG/M2 | HEIGHT: 65 IN

## 2022-05-11 DIAGNOSIS — M75.101 RIGHT ROTATOR CUFF TEAR ARTHROPATHY: ICD-10-CM

## 2022-05-11 DIAGNOSIS — M12.811 RIGHT ROTATOR CUFF TEAR ARTHROPATHY: ICD-10-CM

## 2022-05-11 DIAGNOSIS — M17.12 PRIMARY OSTEOARTHRITIS OF LEFT KNEE: Primary | ICD-10-CM

## 2022-05-11 DIAGNOSIS — M54.12 CERVICAL RADICULOPATHY: ICD-10-CM

## 2022-05-11 DIAGNOSIS — M25.511 ACUTE PAIN OF RIGHT SHOULDER: ICD-10-CM

## 2022-05-11 PROCEDURE — 20610 DRAIN/INJ JOINT/BURSA W/O US: CPT | Performed by: ORTHOPAEDIC SURGERY

## 2022-05-11 RX ORDER — BUPIVACAINE HYDROCHLORIDE 2.5 MG/ML
1 INJECTION, SOLUTION INFILTRATION; PERINEURAL ONCE
Status: COMPLETED | OUTPATIENT
Start: 2022-05-11 | End: 2022-05-11

## 2022-05-11 RX ORDER — TRIAMCINOLONE ACETONIDE 40 MG/ML
40 INJECTION, SUSPENSION INTRA-ARTICULAR; INTRAMUSCULAR ONCE
Status: COMPLETED | OUTPATIENT
Start: 2022-05-11 | End: 2022-05-11

## 2022-05-11 RX ADMIN — BUPIVACAINE HYDROCHLORIDE 2.5 MG: 2.5 INJECTION, SOLUTION INFILTRATION; PERINEURAL at 08:40

## 2022-05-11 RX ADMIN — TRIAMCINOLONE ACETONIDE 40 MG: 40 INJECTION, SUSPENSION INTRA-ARTICULAR; INTRAMUSCULAR at 08:40

## 2022-05-11 NOTE — LETTER
5/11/2022    Patient: America Prader   YOB: 1945   Date of Visit: 5/11/2022     Erlinda Joseph MD  [facility]  [address]  [address] [facility] [address]  Via Fax: [phone]    Dear Erlinda Joseph MD,      Thank you for referring Ms. Berna Orona to [facility] for evaluation. My notes for this consultation are attached. If you have questions, please do not hesitate to call me. I look forward to following your patient along with you.       Sincerely,    Cyndy Palacios, DO

## 2022-05-11 NOTE — PROGRESS NOTES
Ernesto Calderon (: 1945) is a 69 y.o. female, established patient, here for evaluation of the following chief complaint(s):  Neck Pain and Shoulder Pain       ASSESSMENT/PLAN:  Below is the assessment and plan developed based on review of pertinent history, physical exam, labs, studies, and medications. Findings were discussed with the patient today. She elected to try viscosupplementation injection of the left knee. She would also like to try corticosteroid injection for a trigger point at her right parascapular region. She had good relief with previous injection. She will also follow with our spine team.  We discussed the risks and benefits of injection and informed consent was obtained. After a sterile preparation, 1 cc of bupivicaine and 40 mg of Kenalog were injected into the right shoulder region trigger point. Similar technique was used to perform a viscosupplementation injection for the left knee using gel 1. The patient tolerated the procedure well and there were no complications. Post injection pain, blood sugar elevation, skin discoloration, fatty atrophy and the signs of infection were discussed in detail. The patient was instructed to contact us if there were any questions or concerns prior to their follow up appointment. 1. Primary osteoarthritis of left knee  2. Cervical radiculopathy  3. Acute pain of right shoulder  4. Right rotator cuff tear arthropathy      Return in about 3 months (around 2022), or if symptoms worsen or fail to improve. SUBJECTIVE/OBJECTIVE:  Ernesto Calderon (: 1945) is a 69 y.o. female. She notes continued radiating pain into the right parascapular region and down the arm. She has noted some neck pain and stiffness as well. She recently had a repeat MRI of the cervical spine indicating severe foraminal stenosis at the C5-6 level. She has tried everything from physical therapy to epidural injections over the years.   She notes that her symptoms are worsening. She describes clicking and popping in the left knee. Previous corticosteroid injection lasted about 2 months. Allergies   Allergen Reactions    Cephalexin Other (comments)     Other reaction(s): Dermatological problems, e.g., rash, hives    Penicillins Rash     Other reaction(s): Dermatological problems, e.g., rash, hives  Other reaction(s): Dermatological problems, e.g., rash, hives      Aspirin Rash     Unable to take large dosages  Unable to take large dosages      Codeine Other (comments)     Rash, unable to take large dosages       Penicillamine Rash    Penicillamine Rash    Synthroid [Levothyroxine] Rash and Itching     To breast area    Levothyroxine Itching and Rash     To breast area       Current Outpatient Medications   Medication Sig    gabapentin (NEURONTIN) 100 mg capsule TAKE 1 TO 2 CAPSULES BY MOUTH UP TO 4 TIMES A DAY AS NEEDED FOR HEADACHE MIGRAINES    baclofen (LIORESAL) 10 mg tablet Take 1 Tablet by mouth two (2) times a day.  budesonide (ENTOCORT EC) 3 mg capsule TAKE 3 CAPSULES BY MOUTH DAILY    butalbital-acetaminophen-caffeine (FIORICET, ESGIC) -40 mg per tablet Take 2 Tablets by mouth every eight (8) hours as needed for Headache.  diphenoxylate-atropine (LOMOTIL) 2.5-0.025 mg/5 mL liquid Take 5 mL by mouth. (Patient not taking: Reported on 1/12/2022)    B.infantis-B.ani-B.long-B.bifi (Probiotic 4X) 10-15 mg TbEC Take  by mouth.  thyroid, Pork, (Sunman Thyroid) 90 mg tablet Sunman Thyroid 90 mg tablet   Take 1 tablet every day by oral route.  atorvastatin (LIPITOR) 10 mg tablet every evening.  diclofenac EC (VOLTAREN) 75 mg EC tablet 75 mg two (2) times a day.  conjugated estrogens (PREMARIN) 0.625 mg tablet Take  by mouth. No current facility-administered medications for this visit.        Social History     Socioeconomic History    Marital status:      Spouse name: Not on file    Number of children: Not on file  Years of education: Not on file    Highest education level: Not on file   Occupational History    Not on file   Tobacco Use    Smoking status: Former Smoker     Packs/day: 1.00     Years: 8.00     Pack years: 8.00     Quit date: 1970     Years since quittin.8    Smokeless tobacco: Never Used   Vaping Use    Vaping Use: Never used   Substance and Sexual Activity    Alcohol use: Not Currently     Alcohol/week: 0.0 standard drinks     Comment: very occasionally    Drug use: Never    Sexual activity: Yes     Partners: Male     Birth control/protection: None   Other Topics Concern    Not on file   Social History Narrative    Not on file     Social Determinants of Health     Financial Resource Strain:     Difficulty of Paying Living Expenses: Not on file   Food Insecurity:     Worried About Running Out of Food in the Last Year: Not on file    Ruperto of Food in the Last Year: Not on file   Transportation Needs:     Lack of Transportation (Medical): Not on file    Lack of Transportation (Non-Medical):  Not on file   Physical Activity:     Days of Exercise per Week: Not on file    Minutes of Exercise per Session: Not on file   Stress:     Feeling of Stress : Not on file   Social Connections:     Frequency of Communication with Friends and Family: Not on file    Frequency of Social Gatherings with Friends and Family: Not on file    Attends Yazidi Services: Not on file    Active Member of [address] [place] [address] or Organizations: Not on file    Attends Club or Organization Meetings: Not on file    Marital Status: Not on file   Intimate Partner Violence:     Fear of Current or Ex-Partner: Not on file    Emotionally Abused: Not on file    Physically Abused: Not on file    Sexually Abused: Not on file   Housing Stability:     Unable to Pay for Housing in the Last Year: Not on file    Number of [place] in the Last Year: Not on file    Unstable Housing in the Last Year: Not on file       Past Surgical History:   Procedure Laterality Date    COLONOSCOPY N/A 10/4/2021    COLONOSCOPY WITH BIOPSY performed by Kamron [facility]y [facility] at [facility]  10/4/2021         HX ANKLE FRACTURE [address]      HX APPENDECTOMY      HX COLONOSCOPY      HX GYN      hysterectomy at age 28   [de-identified] ORTHOPAEDIC Left 2017    rotator cuff repair       Family History   Problem Relation Age of Onset    Dementia Mother     Breast Cancer Mother     Other Mother         POLIO AND COLITIS    Cancer Mother         Breast cancer, alzheimers    COPD Father     Migraines Child     Allergic Rhinitis Child     Sleep Apnea Child         OB History    No obstetric history on file. REVIEW OF SYSTEMS:  ROS     Positive for: Musculoskeletal    Last edited by Candy Bradley on 5/11/2022  8:13 AM. (History)        Patient denies any recent fever, chills, nausea, vomiting, chest pain, or shortness of breath. Vitals:  Ht 5' 5\" (1.651 m)   Wt 114 lb (51.7 kg)   BMI 18.97 kg/m²    Body mass index is 18.97 kg/m². PHYSICAL EXAM:  General exam: Patient is awake, alert, and oriented x3. Well-appearing. No acute distress. Ambulates with an antalgic gait    Neck: There is tenderness to palpation in the paraspinal region. No obvious midline tenderness to palpation. There is stiffness with flexion and extension of the cervical spine. Negative Spurling's exam.  No erythema or ecchymosis. She has a palpable trigger point at the right parascapular region left knee: Neurovascular and sensory intact. Effusion is present. Crepitus is noted with range of motion. There is tenderness palpation at the medial and lateral joint line. Eda's maneuver creates mild pain. Normal stability on ligamentous testing including Lachman's exam.  Stable anterior and posterior drawer. No erythema or ecchymosis.         There is normal range of motion of both shoulders and minimal pain with impingement testing including Ojeda exam.  Grossly neurovascular and sensory intact. IMAGING:  Previous x-rays of the left knee show evidence of joint space narrowing and osteophyte formation    Recent MRI of the cervical spine shows evidence of multilevel cervical spondylosis. Most significant level is the C5-6 level with severe bilateral neuroforaminal narrowing. XR Results (most recent):  Results from Appointment encounter on 01/07/22    XR SACRUM AND COCCYX    Narrative  X-rays 3 views of the sacrum done today show evidence of a nondisplaced fracture at the distal sacrum/coccyx. Results from Hospital Encounter encounter on 09/24/20    XR SPINE CERV W OBL/FLEX/EXT MIN 6 V COMP    Narrative  Clinical indication: Migraine with neck pain. Chronic. Cervical radiculopathy. Lateral neutral flexion-extension, AP, both oblique and odontoid views of the  cervical spine. There is no fracture or focal lytic lesion. There are disc degeneration and spondylosis seen at C4-5 and C6-C7. Small  anterior subluxation approximately 2 mm seen on flexion at C3-C4. Reduction seen  in extension. Mild foraminal narrowing seen on the right at 6 C7. Impression  IMPRESSION:  Degenerative disc and joint disease as detailed above. No orders of the defined types were placed in this encounter. An electronic signature was used to authenticate this note.   -- Yadira Oakley DO

## 2022-06-06 ENCOUNTER — OFFICE VISIT (OUTPATIENT)
Dept: ORTHOPEDIC SURGERY | Age: 77
End: 2022-06-06
Payer: MEDICARE

## 2022-06-06 VITALS — WEIGHT: 114 LBS | BODY MASS INDEX: 18.99 KG/M2 | HEIGHT: 65 IN

## 2022-06-06 DIAGNOSIS — M17.12 PRIMARY OSTEOARTHRITIS OF LEFT KNEE: ICD-10-CM

## 2022-06-06 DIAGNOSIS — M25.562 LEFT KNEE PAIN, UNSPECIFIED CHRONICITY: Primary | ICD-10-CM

## 2022-06-06 DIAGNOSIS — M70.52 PES ANSERINUS BURSITIS OF LEFT KNEE: ICD-10-CM

## 2022-06-06 PROCEDURE — 20600 DRAIN/INJ JOINT/BURSA W/O US: CPT | Performed by: ORTHOPAEDIC SURGERY

## 2022-06-06 PROCEDURE — G8536 NO DOC ELDER MAL SCRN: HCPCS | Performed by: ORTHOPAEDIC SURGERY

## 2022-06-06 PROCEDURE — G8420 CALC BMI NORM PARAMETERS: HCPCS | Performed by: ORTHOPAEDIC SURGERY

## 2022-06-06 PROCEDURE — 1101F PT FALLS ASSESS-DOCD LE1/YR: CPT | Performed by: ORTHOPAEDIC SURGERY

## 2022-06-06 PROCEDURE — 99215 OFFICE O/P EST HI 40 MIN: CPT | Performed by: ORTHOPAEDIC SURGERY

## 2022-06-06 PROCEDURE — 1090F PRES/ABSN URINE INCON ASSESS: CPT | Performed by: ORTHOPAEDIC SURGERY

## 2022-06-06 PROCEDURE — G8432 DEP SCR NOT DOC, RNG: HCPCS | Performed by: ORTHOPAEDIC SURGERY

## 2022-06-06 PROCEDURE — G8427 DOCREV CUR MEDS BY ELIG CLIN: HCPCS | Performed by: ORTHOPAEDIC SURGERY

## 2022-06-06 PROCEDURE — G8400 PT W/DXA NO RESULTS DOC: HCPCS | Performed by: ORTHOPAEDIC SURGERY

## 2022-06-06 PROCEDURE — 1123F ACP DISCUSS/DSCN MKR DOCD: CPT | Performed by: ORTHOPAEDIC SURGERY

## 2022-06-06 RX ORDER — TRIAMCINOLONE ACETONIDE 40 MG/ML
40 INJECTION, SUSPENSION INTRA-ARTICULAR; INTRAMUSCULAR ONCE
Status: COMPLETED | OUTPATIENT
Start: 2022-06-06 | End: 2022-06-06

## 2022-06-06 RX ORDER — BUPIVACAINE HYDROCHLORIDE 2.5 MG/ML
1 INJECTION, SOLUTION INFILTRATION; PERINEURAL ONCE
Status: COMPLETED | OUTPATIENT
Start: 2022-06-06 | End: 2022-06-06

## 2022-06-06 RX ADMIN — BUPIVACAINE HYDROCHLORIDE 2.5 MG: 2.5 INJECTION, SOLUTION INFILTRATION; PERINEURAL at 11:56

## 2022-06-06 RX ADMIN — TRIAMCINOLONE ACETONIDE 40 MG: 40 INJECTION, SUSPENSION INTRA-ARTICULAR; INTRAMUSCULAR at 11:56

## 2022-06-06 NOTE — PROGRESS NOTES
Viktoriya Mireles (: 1945) is a 69 y.o. female, established patient, here for evaluation of the following chief complaint(s):  Knee Pain       ASSESSMENT/PLAN:  Below is the assessment and plan developed based on review of pertinent history, physical exam, labs, studies, and medications. We discussed different treatment options today. I believe that some of her pain is due to osteoarthritis. Most of her arthritis is localized to the medial joint space which is where her pain is. However, she does seem to have some Pes anserine bursitis symptoms. This could be related to her recent increase in activity. She elected to try corticosteroid injection in this area. We discussed the risks and benefits of injection and informed consent was obtained. After a sterile preparation, 1 cc of bupivicaine and 40 mg of Kenalog were injected into the left knee pes anserine bursa. The patient tolerated the procedure well and there were no complications. Post injection pain, blood sugar elevation, skin discoloration, fatty atrophy and the signs of infection were discussed in detail. The patient was instructed to contact us if there were any questions or concerns prior to their follow up appointment. 1. Left knee pain, unspecified chronicity  -     XR KNEE LT MAX 2 VWS; Future  2. Pes anserinus bursitis of left knee  -     bupivacaine HCl (MARCAINE) 0.25 % (2.5 mg/mL) injection 2.5 mg; 2.5 mg (1 mL), Other, ONCE, 1 dose, On 22 at 1200  -     triamcinolone acetonide (KENALOG-40) 40 mg/mL injection 40 mg; 40 mg, Intra artICUlar, ONCE, 1 dose, On 22 at 1200  3. Primary osteoarthritis of left knee      Return if symptoms worsen or fail to improve. SUBJECTIVE/OBJECTIVE:  Viktoriya Mireles (: 1945) is a 69 y.o. female. She notes continued aching pain at one spot at the inferior medial knee. She has continued to remain active and is very active with her dogs.   She describes sharp pain and point tenderness at the proximal tibia and in the area of the pes anserine bursa. Previous viscosupplementation injection provided minimal relief. Allergies   Allergen Reactions    Cephalexin Other (comments)     Other reaction(s): Dermatological problems, e.g., rash, hives    Penicillins Rash     Other reaction(s): Dermatological problems, e.g., rash, hives  Other reaction(s): Dermatological problems, e.g., rash, hives      Aspirin Rash     Unable to take large dosages  Unable to take large dosages      Codeine Other (comments)     Rash, unable to take large dosages       Penicillamine Rash    Penicillamine Rash    Synthroid [Levothyroxine] Rash and Itching     To breast area    Levothyroxine Itching and Rash     To breast area       Current Outpatient Medications   Medication Sig    gabapentin (NEURONTIN) 100 mg capsule TAKE 1 TO 2 CAPSULES BY MOUTH UP TO 4 TIMES A DAY AS NEEDED FOR HEADACHE MIGRAINES    baclofen (LIORESAL) 10 mg tablet Take 1 Tablet by mouth two (2) times a day.  budesonide (ENTOCORT EC) 3 mg capsule TAKE 3 CAPSULES BY MOUTH DAILY    butalbital-acetaminophen-caffeine (FIORICET, ESGIC) -40 mg per tablet Take 2 Tablets by mouth every eight (8) hours as needed for Headache.  diphenoxylate-atropine (LOMOTIL) 2.5-0.025 mg/5 mL liquid Take 5 mL by mouth. (Patient not taking: Reported on 1/12/2022)    B.infantis-B.ani-B.long-B.bifi (Probiotic 4X) 10-15 mg TbEC Take  by mouth.  thyroid, Pork, ([place] Thyroid) 90 mg tablet [place] Thyroid 90 mg tablet   Take 1 tablet every day by oral route.  atorvastatin (LIPITOR) 10 mg tablet every evening.  diclofenac EC (VOLTAREN) 75 mg EC tablet 75 mg two (2) times a day.  conjugated estrogens (PREMARIN) 0.625 mg tablet Take  by mouth.      Current Facility-Administered Medications   Medication    bupivacaine HCl (MARCAINE) 0.25 % (2.5 mg/mL) injection 2.5 mg    triamcinolone acetonide (KENALOG-40) 40 mg/mL injection 40 mg Social History     Socioeconomic History    Marital status:      Spouse name: Not on file    Number of children: Not on file    Years of education: Not on file    Highest education level: Not on file   Occupational History    Not on file   Tobacco Use    Smoking status: Former Smoker     Packs/day: 1.00     Years: 8.00     Pack years: 8.00     Quit date: 1970     Years since quittin.8    Smokeless tobacco: Never Used   Vaping Use    Vaping Use: Never used   Substance and Sexual Activity    Alcohol use: Not Currently     Alcohol/week: 0.0 standard drinks     Comment: very occasionally    Drug use: Never    Sexual activity: Yes     Partners: Male     Birth control/protection: None   Other Topics Concern    Not on file   Social History Narrative    Not on file     Social Determinants of Health     Financial Resource Strain:     Difficulty of Paying Living Expenses: Not on file   Food Insecurity:     Worried About Running Out of Food in the Last Year: Not on file    Ruperto of Food in the Last Year: Not on file   Transportation Needs:     Lack of Transportation (Medical): Not on file    Lack of Transportation (Non-Medical):  Not on file   Physical Activity:     Days of Exercise per Week: Not on file    Minutes of Exercise per Session: Not on file   Stress:     Feeling of Stress : Not on file   Social Connections:     Frequency of Communication with Friends and Family: Not on file    Frequency of Social Gatherings with Friends and Family: Not on file    Attends Nondenominational Services: Not on file    Active Member of Clubs or Organizations: Not on file    Attends Club or Organization Meetings: Not on file    Marital Status: Not on file   Intimate Partner Violence:     Fear of Current or Ex-Partner: Not on file    Emotionally Abused: Not on file    Physically Abused: Not on file    Sexually Abused: Not on file   Housing Stability:     Unable to Pay for Housing in the Last Year: Not on file    Number of Places Lived in the Last Year: Not on file    Unstable Housing in the Last Year: Not on file       Past Surgical History:   Procedure Laterality Date    COLONOSCOPY N/A 10/4/2021    COLON[facility]COPY WITH BIOPSY [facility]  10/4/2021         HX ANKLE FRACTURE [place]      HX APPENDECTOMY      HX COLONOSCOPY      HX GYN      hysterectomy at age 28   [facility] ORTHOPAEDIC Left 2017    rotator cuff repair       Family History   Problem Relation Age of Onset    Dementia Mother     Breast Cancer Mother     Other Mother         POLIO AND COLITIS    Cancer Mother         Breast cancer, alzheimers    COPD Father     Migraines Child     Allergic Rhinitis Child     Sleep Apnea Child         OB History    No obstetric history on file. REVIEW OF SYSTEMS:  ROS     Positive for: Musculoskeletal    Last edited by Janet Sidhu on 6/6/2022 10:44 AM. (History)        Patient denies any recent fever, chills, nausea, vomiting, chest pain, or shortness of breath. Vitals:  Ht 5' 5\" (1.651 m)   Wt 114 lb (51.7 kg)   BMI 18.97 kg/m²    Body mass index is 18.97 kg/m². PHYSICAL EXAM:  General exam: Patient is awake, alert, and oriented x3. Well-appearing. No acute distress. Ambulates with an antalgic gait    Left knee: There is crepitus with range of motion of the knee. Mild pain with Eda's testing. There is point tenderness palpation at the proximal medial tibia in the area of the pes anserine bursa. No open wounds, swelling or ecchymosis. IMAGING:    XR Results (most recent):  Results from Appointment encounter on 06/06/22    XR KNEE LT MAX 2 VWS    Narrative  X-rays of the left knee 4 views done today show evidence of bone-on-bone joint space narrowing and early osteophyte formation at the medial compartment.   Small area of calcium deposition noted at the lateral joint line      Results from Appointment encounter on 01/07/22    XR SACRUM AND COCCYX    Narrative  X-rays 3 views of the sacrum done today show evidence of a nondisplaced fracture at the distal sacrum/coccyx. Results from Hospital Encounter encounter on 09/24/20    XR SPINE CERV W OBL/FLEX/EXT MIN 6 V COMP    Narrative  Clinical indication: Migraine with neck pain. Chronic. Cervical radiculopathy. Lateral neutral flexion-extension, AP, both oblique and odontoid views of the  cervical spine. There is no fracture or focal lytic lesion. There are disc degeneration and spondylosis seen at C4-5 and C6-C7. Small  anterior subluxation approximately 2 mm seen on flexion at C3-C4. Reduction seen  in extension. Mild foraminal narrowing seen on the right at 6 C7. Impression  IMPRESSION:  Degenerative disc and joint disease as detailed above. Orders Placed This Encounter    XR KNEE LT MAX 2 VWS     3     Standing Status:   Future     Number of Occurrences:   1     Standing Expiration Date:   6/7/2023    bupivacaine HCl (MARCAINE) 0.25 % (2.5 mg/mL) injection 2.5 mg    triamcinolone acetonide (KENALOG-40) 40 mg/mL injection 40 mg              An electronic signature was used to authenticate this note.   -- Yadira Oakley DO

## 2022-06-06 NOTE — LETTER
6/6/2022    Patient: Ayo Marquez   YOB: 1945   Date of Visit: 6/6/2022     Femi Paul MD  [facility]  [facility] [address] [facility] [address]  Via Fax: [phone]    Dear Femi Paul MD,      Thank you for referring Ms. Peyman Shay to [facility] for evaluation. My notes for this consultation are attached. If you have questions, please do not hesitate to call me. I look forward to following your patient along with you.       Sincerely,    Annetta Matthew, DO

## 2022-06-09 DIAGNOSIS — G43.109 MIGRAINE WITH AURA, NOT INTRACTABLE, WITHOUT STATUS MIGRAINOSUS: ICD-10-CM

## 2022-06-09 DIAGNOSIS — M47.22 CERVICAL RADICULOPATHY DUE TO DEGENERATIVE JOINT DISEASE OF SPINE: ICD-10-CM

## 2022-06-09 DIAGNOSIS — M48.061 DEGENERATIVE LUMBAR SPINAL STENOSIS: ICD-10-CM

## 2022-06-09 DIAGNOSIS — G44.86 CERVICOGENIC HEADACHE: ICD-10-CM

## 2022-06-09 RX ORDER — BUTALBITAL, ACETAMINOPHEN AND CAFFEINE 50; 325; 40 MG/1; MG/1; MG/1
TABLET ORAL
Qty: 50 TABLET | Refills: 5 | Status: SHIPPED | OUTPATIENT
Start: 2022-06-09

## 2022-07-19 ENCOUNTER — OFFICE VISIT (OUTPATIENT)
Dept: ORTHOPEDIC SURGERY | Age: 77
End: 2022-07-19
Payer: MEDICARE

## 2022-07-19 VITALS — WEIGHT: 114 LBS | HEIGHT: 65 IN | BODY MASS INDEX: 18.99 KG/M2

## 2022-07-19 DIAGNOSIS — M17.12 ARTHRITIS OF LEFT KNEE: Primary | ICD-10-CM

## 2022-07-19 PROCEDURE — 20610 DRAIN/INJ JOINT/BURSA W/O US: CPT | Performed by: ORTHOPAEDIC SURGERY

## 2022-07-19 RX ORDER — TRIAMCINOLONE ACETONIDE 40 MG/ML
40 INJECTION, SUSPENSION INTRA-ARTICULAR; INTRAMUSCULAR ONCE
Status: COMPLETED | OUTPATIENT
Start: 2022-07-19 | End: 2022-07-19

## 2022-07-19 RX ADMIN — TRIAMCINOLONE ACETONIDE 40 MG: 40 INJECTION, SUSPENSION INTRA-ARTICULAR; INTRAMUSCULAR at 13:40

## 2022-07-19 NOTE — PROGRESS NOTES
Malaika Andre (: 1945) is a 69 y.o. female patient, here for evaluation of the following chief complaint(s):  Knee Pain (left knee pain)       ASSESSMENT/PLAN:  Below is the assessment and plan developed based on review of pertinent history, physical exam, labs, studies, and medications. 15-year-old female comes in today as referral from one of my partners. She is having left-sided medially based knee pain as well as occasional posterior knee pain. Is been going on for several years. She is on multiple injections. She said the injections have stopped working as well as they used to. The pain is rated as mild to occasionally moderate. She describes it as dull. She can walk 10 blocks. She notices an occasional limp. Her x-rays were independently reviewed today which reveal significant medial joint space narrowing with subchondral sclerosis. I discussed this with her. She is considering possible surgery in the future. We discussed the possibility of a partial knee replacement. For now she would like to try another injection. I injected 40 mg of triamcinolone into her left knee joint using sterile technique. She tolerated very well. I also discussed with her the continued use of diclofenac and the gabapentin she is taking. Plan for follow-up as needed. If she decides to move forward with surgery she will let us know      1. Arthritis of left knee  -     DRAIN/INJECT LARGE JOINT/BURSA      Encounter Diagnosis   Name Primary?  Arthritis of left knee Yes        No follow-ups on file. SUBJECTIVE/OBJECTIVE:  Malaika Andre (: 1945) is a 69 y.o. female who presents today for the following:  Chief Complaint   Patient presents with    Knee Pain     left knee pain       15-year-old female comes in today as referral from one of my partners. She is having left-sided medially based knee pain as well as occasional posterior knee pain. Is been going on for several years.   She is on multiple injections. She said the injections have stopped working as well as they used to. The pain is rated as mild to occasionally moderate. She describes it as dull. She can walk 10 blocks. She notices an occasional limp. IMAGING:  I independently reviewed x-rays of the left knee taken by my partner. Significant medial joint space narrowing with subchondral sclerosis. She also has some mild pseudogout with chondrocalcinosis along the joint line. XR Results (most recent):  Results from Appointment encounter on 06/06/22    XR KNEE LT MAX 2 VWS    Narrative  X-rays of the left knee 4 views done today show evidence of bone-on-bone joint space narrowing and early osteophyte formation at the medial compartment. Small area of calcium deposition noted at the lateral joint line       Allergies   Allergen Reactions    Cephalexin Other (comments)     Other reaction(s): Dermatological problems, e.g., rash, hives    Penicillins Rash     Other reaction(s): Dermatological problems, e.g., rash, hives  Other reaction(s): Dermatological problems, e.g., rash, hives      Aspirin Rash     Unable to take large dosages  Unable to take large dosages      Codeine Other (comments)     Rash, unable to take large dosages       Penicillamine Rash    Penicillamine Rash    Synthroid [Levothyroxine] Rash and Itching     To breast area    Levothyroxine Itching and Rash     To breast area       Current Outpatient Medications   Medication Sig    butalbital-acetaminophen-caffeine (FIORICET, ESGIC) -40 mg per tablet TAKE 2 TABLETS BY MOUTH EVERY 8 HOURS AS NEEDED FOR HEADACHE    gabapentin (NEURONTIN) 100 mg capsule TAKE 1 TO 2 CAPSULES BY MOUTH UP TO 4 TIMES A DAY AS NEEDED FOR HEADACHE MIGRAINES    budesonide (ENTOCORT EC) 3 mg capsule TAKE 3 CAPSULES BY MOUTH DAILY    B.infantis-B.ani-B.long-B.bifi (Probiotic 4X) 10-15 mg TbEC Take  by mouth.     thyroid, Pork, ([place] Thyroid) 90 mg tablet [place] Thyroid 90 mg tablet   Take 1 tablet every day by oral route.  atorvastatin (LIPITOR) 10 mg tablet every evening.  diclofenac EC (VOLTAREN) 75 mg EC tablet 75 mg two (2) times a day.  conjugated estrogens (PREMARIN) 0.625 mg tablet Take  by mouth.  baclofen (LIORESAL) 10 mg tablet Take 1 Tablet by mouth two (2) times a day.  diphenoxylate-atropine (LOMOTIL) 2.5-0.025 mg/5 mL liquid Take 5 mL by mouth.  (Patient not taking: Reported on 2022)     Current Facility-Administered Medications   Medication    triamcinolone acetonide (KENALOG-40) 40 mg/mL injection 40 mg       Past Medical History:   Diagnosis Date    Arthritis     Cancer ([facility] [address]     Skin    Degenerative cervical spinal stenosis     Degenerative lumbar spinal stenosis     Hyperlipemia     Hypertension     Occipital neuralgia of right side     Thyroid disease     Ulceration of vulva 2008    Wound of left leg 2017        Past Surgical History:   Procedure Laterality Date    COLONOSCOPY N/A 10/4/2021    COLONOSCOPY WITH BIOPSY performed by Chano Gerber MD at [place] [facility]  10/4/2021         HX ANKLE FRACTURE TX      HX APPENDECTOMY      HX COLONOSCOPY      HX GYN      hysterectomy at age [id number] [place]   [de-identified] ORTHOPAEDIC Left 2017    rotator cuff repair       Family History   Problem Relation Age of Onset    Dementia Mother     Breast Cancer Mother     Other Mother         POLIO AND COLITIS    Cancer Mother         Breast cancer, alzheimers    COPD Father     Migraines Child     Allergic Rhinitis Child     Sleep Apnea Child         Social History     Tobacco Use    Smoking status: Former Smoker     Packs/day: 1.00     Years: 8.00     Pack years: 8.00     Quit date: 1970     Years since quittin.0    Smokeless tobacco: Never Used   Substance Use Topics    Alcohol use: Not Currently     Alcohol/week: 0.0 standard drinks     Comment: very occasionally        All systems reviewed x 12 and were negative with the exception of None      Pain Assessment  1/7/2022   Location of Pain Shoulder   Location Modifiers Right   Severity of Pain 6   Quality of Pain Dull;Aching; Throbbing   Duration of Pain Persistent   Frequency of Pain Constant          Vitals:  Ht 5' 5\" (1.651 m)   Wt 114 lb (51.7 kg)   BMI 18.97 kg/m²    Body mass index is 18.97 kg/m². Physical Exam    General: NAD, well developed, well nourished, alert and oriented x 3. Cardiac: Extremities well perfused    Respiratory: Nonlabored breathing    LLE: Minimal antalgic gait. Minimal effusion noted. No previous incisions noted. ROM 0-120 degrees. Grossly stable to varus/valgus stress and anterior/posterior drawer tests. Tender over medial joint line motor grossly intact. RLE: Normal gait and station. Negative stinchfield. No effusion noted. No previous incisions noted. ROM 0-120 degrees. Grossly stable to varus/valgus stress and anterior/posterior drawer tests. Negative McMurrays. Motor grossly intact. Vascular: Palpable pedal pulses, equal bilaterally. Skin: Warm well perfused, cap refill < 2 sec. An electronic signature was used to authenticate this note.   -- Rafael Dominguez MD

## 2022-07-26 ENCOUNTER — VIRTUAL VISIT (OUTPATIENT)
Dept: NEUROLOGY | Age: 77
End: 2022-07-26
Payer: MEDICARE

## 2022-07-26 DIAGNOSIS — G44.86 CERVICOGENIC HEADACHE: Primary | ICD-10-CM

## 2022-07-26 DIAGNOSIS — G43.109 MIGRAINE WITH AURA, NOT INTRACTABLE, WITHOUT STATUS MIGRAINOSUS: ICD-10-CM

## 2022-07-26 PROBLEM — G89.29 CHRONIC RIGHT SHOULDER PAIN: Status: ACTIVE | Noted: 2020-04-06

## 2022-07-26 PROBLEM — K52.9 COLITIS: Status: ACTIVE | Noted: 2021-10-04

## 2022-07-26 PROBLEM — M25.511 CHRONIC RIGHT SHOULDER PAIN: Status: ACTIVE | Noted: 2020-04-06

## 2022-07-26 PROBLEM — M43.06 LUMBAR SPONDYLOLYSIS: Status: ACTIVE | Noted: 2022-04-05

## 2022-07-26 PROBLEM — M75.101 NONTRAUMATIC TEAR OF RIGHT ROTATOR CUFF: Status: ACTIVE | Noted: 2021-09-14

## 2022-07-26 PROCEDURE — 1123F ACP DISCUSS/DSCN MKR DOCD: CPT | Performed by: PSYCHIATRY & NEUROLOGY

## 2022-07-26 PROCEDURE — G8400 PT W/DXA NO RESULTS DOC: HCPCS | Performed by: PSYCHIATRY & NEUROLOGY

## 2022-07-26 PROCEDURE — G8427 DOCREV CUR MEDS BY ELIG CLIN: HCPCS | Performed by: PSYCHIATRY & NEUROLOGY

## 2022-07-26 PROCEDURE — 1101F PT FALLS ASSESS-DOCD LE1/YR: CPT | Performed by: PSYCHIATRY & NEUROLOGY

## 2022-07-26 PROCEDURE — G8420 CALC BMI NORM PARAMETERS: HCPCS | Performed by: PSYCHIATRY & NEUROLOGY

## 2022-07-26 PROCEDURE — 99214 OFFICE O/P EST MOD 30 MIN: CPT | Performed by: PSYCHIATRY & NEUROLOGY

## 2022-07-26 PROCEDURE — 1090F PRES/ABSN URINE INCON ASSESS: CPT | Performed by: PSYCHIATRY & NEUROLOGY

## 2022-07-26 PROCEDURE — G8432 DEP SCR NOT DOC, RNG: HCPCS | Performed by: PSYCHIATRY & NEUROLOGY

## 2022-07-26 PROCEDURE — G8536 NO DOC ELDER MAL SCRN: HCPCS | Performed by: PSYCHIATRY & NEUROLOGY
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
## 2022-07-26 NOTE — ASSESSMENT & PLAN NOTE
Being addressed through other specialist regarding orthopedics and pain management but certainly contributes to her migraines    Continue on butalbital no more than 2 full tablets per day with a limit of 50/month

## 2022-07-26 NOTE — PROGRESS NOTES
1. Have you been to the ER, urgent care clinic since your last visit? Hospitalized since your last visit? No.    2. Have you seen or consulted any other health care providers outside of the [address] [place] [address] since your last visit? Include any pap smears or colon screening.  No.    Chief Complaint   Patient presents with    Migraine     Last seen 8/9/21- pt denies any symptoms- weather related and night time migraines

## 2022-07-26 NOTE — ASSESSMENT & PLAN NOTE
Reasonably well-controlled at this time as long as issues related to her cervicogenic headaches and cervical degeneration and occipital neuralgia are well controlled and managed by other specialist

## 2022-07-26 NOTE — PATIENT INSTRUCTIONS
As per discussion    Overall from a neurologic perspective you are doing reasonably well  Certainly your headaches are much improved from when I first met you    Continue with all your specialist as appropriate    And we will see you back with virtual visit in 6 months    In the meantime have a great rest your year a kristin holiday season and a happy new year      Office Policies      Appointments  Please make sure that you arrive for your next appointment at least 15 minutes prior to your appointment time. If for some reason you are going to be late please notify the office to determine if you need to be rescheduled or we can adjust your appointment time      Phone calls/patient messages:  Please allow up to 24 hours for someone in the office to contact you about your call or message. Be mindful your provider may be out of the office or your message may require further review. We encourage you to use [email or web address] for your messages as this is a faster, more efficient way to communicate with our office    Medication Refills:  Prescription medications require up to 48 business hours to process. We encourage you to use [email or web address] for your refills. For controlled medications: Please allow up to 72 business hours to process. Certain medications may require you to  a written prescription at our office. NO narcotic/controlled medications will be prescribed after 4pm Monday through Friday or on weekends    Form/Paperwork Completion:  We ask that you allow 7-14 business days. You may also download your forms to [email or web address] to have your doctor print off.

## 2022-08-02 DIAGNOSIS — M17.12 PRIMARY OSTEOARTHRITIS OF LEFT KNEE: Primary | ICD-10-CM

## 2022-08-03 RX ORDER — METHOCARBAMOL 750 MG/1
750 TABLET, FILM COATED ORAL
Qty: 30 TABLET | Refills: 0 | Status: SHIPPED | OUTPATIENT
Start: 2022-08-03 | End: 2022-09-19

## 2022-08-11 ENCOUNTER — OFFICE VISIT (OUTPATIENT)
Dept: ORTHOPEDIC SURGERY | Age: 77
End: 2022-08-11
Payer: MEDICARE

## 2022-08-11 VITALS — BODY MASS INDEX: 18.99 KG/M2 | HEIGHT: 65 IN | WEIGHT: 114 LBS

## 2022-08-11 DIAGNOSIS — M54.12 CERVICAL RADICULOPATHY: Primary | ICD-10-CM

## 2022-08-11 DIAGNOSIS — M54.2 NECK PAIN: ICD-10-CM

## 2022-08-11 DIAGNOSIS — M50.30 DDD (DEGENERATIVE DISC DISEASE), CERVICAL: ICD-10-CM

## 2022-08-11 PROCEDURE — 1090F PRES/ABSN URINE INCON ASSESS: CPT | Performed by: STUDENT IN AN ORGANIZED HEALTH CARE EDUCATION/TRAINING PROGRAM

## 2022-08-11 PROCEDURE — 1123F ACP DISCUSS/DSCN MKR DOCD: CPT | Performed by: STUDENT IN AN ORGANIZED HEALTH CARE EDUCATION/TRAINING PROGRAM

## 2022-08-11 PROCEDURE — G8400 PT W/DXA NO RESULTS DOC: HCPCS | Performed by: STUDENT IN AN ORGANIZED HEALTH CARE EDUCATION/TRAINING PROGRAM

## 2022-08-11 PROCEDURE — 1101F PT FALLS ASSESS-DOCD LE1/YR: CPT | Performed by: STUDENT IN AN ORGANIZED HEALTH CARE EDUCATION/TRAINING PROGRAM

## 2022-08-11 PROCEDURE — G8420 CALC BMI NORM PARAMETERS: HCPCS | Performed by: STUDENT IN AN ORGANIZED HEALTH CARE EDUCATION/TRAINING PROGRAM

## 2022-08-11 PROCEDURE — G8432 DEP SCR NOT DOC, RNG: HCPCS | Performed by: STUDENT IN AN ORGANIZED HEALTH CARE EDUCATION/TRAINING PROGRAM

## 2022-08-11 PROCEDURE — 99213 OFFICE O/P EST LOW 20 MIN: CPT | Performed by: STUDENT IN AN ORGANIZED HEALTH CARE EDUCATION/TRAINING PROGRAM

## 2022-08-11 PROCEDURE — G8427 DOCREV CUR MEDS BY ELIG CLIN: HCPCS | Performed by: STUDENT IN AN ORGANIZED HEALTH CARE EDUCATION/TRAINING PROGRAM

## 2022-08-11 PROCEDURE — G8536 NO DOC ELDER MAL SCRN: HCPCS | Performed by: STUDENT IN AN ORGANIZED HEALTH CARE EDUCATION/TRAINING PROGRAM

## 2022-08-11 NOTE — PROGRESS NOTES
1. Have you been to the ER, urgent care clinic since your last visit? Hospitalized since your last visit? No    2. Have you seen or consulted any other health care providers outside of the [address] [place] [address] since your last visit? Include any pap smears or colon screening.  No    Chief Complaint   Patient presents with    Neck Pain     Cervical MRI @[facility] 4/14/22

## 2022-08-12 NOTE — PROGRESS NOTES
Priti Spring (: 1945) is a 69 y.o. female, patient, here for evaluation of the following chief complaint(s):  Neck Pain (Cervical MRI @[facility])       ASSESSMENT/PLAN:  Below is the assessment and plan developed based on review of pertinent history, physical exam, labs, studies, and medications. Patient presents today for evaluation of chronic neck pain, with associated right upper extremity radiculopathy as detailed below. Her symptoms have progressively improved since she scheduled this appointment. She had a flareup of right-sided neck pain, with increased numbness, particularly at night. She recently had a trigger point injection which she states did help her symptoms. She will mail her most recent MRI disc to the office, for review. At this point, she would like to continue with her pain management, cervical ISABEL's, and weekly massage therapy. She will call when she is ready for surgical consultation. She will continue with methocarbamol and gabapentin. 1. Cervical radiculopathy  -     XR SPINE CERV 4 OR 5 V; Future  2. DDD (degenerative disc disease), cervical  3. Neck pain      No follow-ups on file. SUBJECTIVE/OBJECTIVE:  Priti Spring (: 1945) is a 69 y.o. female. Pain Assessment  2022   Location of Pain Shoulder   Location Modifiers Right   Severity of Pain 6   Quality of Pain Dull;Aching; Throbbing   Duration of Pain Persistent   Frequency of Pain Constant        Patient presents today with a longstanding history of neck pain. She also reports occipital migraines, which she receives injections per neurology. She reports chronic bilateral, right greater than left, neck pain, with intermittent numbness and pain radiating down the posterior aspect of her right arm. Recently, she reported an increase in neck pain and parascapular muscle tenderness to palpation.   A few weeks ago, when she scheduled this appointment, she reports her right-sided neck pain was causing difficulty with sleeping, and she was having associated numbness in her right upper extremity. The symptoms have progressively improved. Additionally, she had a trigger point injection done in her upper trapezium, which she states has helped her symptoms. She denies any upper extremity weakness. Denies any changes in balance. She follows Dr. Claire Oppenheim, for epidural steroid injections in her neck. She also attends massage therapy once a week to help with her symptoms. She takes a half a tablet of methocarbamol twice daily, as well as gabapentin prescribed by neurology. She had a recent MRI performed at [facility], but did not bring the disc with her. Imaging:    XR Results (most recent):  Results from Appointment encounter on 08/11/22    XR SPINE CERV 4 OR 5 V    Narrative  AP, lateral, flexion-extension views of the cervical spine reveal significant spondylosis and disc degeneration, worse at C4-7, with possible autofusion at C4-5. Anterolisthesis at C3-4 in flexion, measuring approximately 3 mm, which reduces to approximately 1 mm in extension. No evidence of obvious acute fractures or lytic lesions. MRI Results (most recent):  MRI of the cervical spine completed on 4/14/2022 at [facility]    Impression-cervical spondylosis. There is moderate spinal stenosis and moderate to severe bilateral neural foraminal narrowing at C5-C6.   Moderate left neural foraminal narrowing and mild spinal stenosis C4-C5         Allergies   Allergen Reactions    Cephalexin Other (comments)     Other reaction(s): Dermatological problems, e.g., rash, hives    Penicillins Rash     Other reaction(s): Dermatological problems, e.g., rash, hives  Other reaction(s): Dermatological problems, e.g., rash, hives      Aspirin Rash     Unable to take large dosages  Unable to take large dosages      Codeine Other (comments)     Rash, unable to take large dosages       Penicillamine Rash    Penicillamine Rash    Prednisone Other (comments) Insomnia, frequent and heart racing    Synthroid [Levothyroxine] Rash and Itching     To breast area    Levothyroxine Itching and Rash     To breast area       Current Outpatient Medications   Medication Sig    methocarbamoL (ROBAXIN) 750 mg tablet Take 1 Tablet by mouth nightly. vit A/vit C/vit E/zinc/copper (PRESERVISION AREDS PO) Take  by mouth daily. butalbital-acetaminophen-caffeine (FIORICET, ESGIC) -40 mg per tablet TAKE 2 TABLETS BY MOUTH EVERY 8 HOURS AS NEEDED FOR HEADACHE    gabapentin (NEURONTIN) 100 mg capsule TAKE 1 TO 2 CAPSULES BY MOUTH UP TO 4 TIMES A DAY AS NEEDED FOR HEADACHE MIGRAINES    budesonide (ENTOCORT EC) 3 mg capsule TAKE 3 CAPSULES BY MOUTH DAILY    B.infantis-B.ani-B.long-B.bifi (Probiotic 4X) 10-15 mg TbEC Take  by mouth daily. thyroid, Pork, (ARMOUR) 90 mg tablet [place] Thyroid 90 mg tablet   Take 1 tablet every day by oral route. atorvastatin (LIPITOR) 10 mg tablet every evening. diclofenac EC (VOLTAREN) 75 mg EC tablet 75 mg two (2) times a day. conjugated estrogens (PREMARIN) 0.625 mg tablet Take 0.625 mg in the morning. 4 times weekly     No current facility-administered medications for this visit.        Past Medical History:   Diagnosis Date    Arthritis     Cancer [facility])     Skin    Degenerative cervical spinal stenosis     Degenerative lumbar spinal stenosis     Hyperlipemia     Hypertension     Occipital neuralgia of right side     Thyroid disease     Ulceration of vulva 1/17/2008    Wound of left leg 12/13/2017        Past Surgical History:   Procedure Laterality Date    COLONOSCOPY N/A 10/4/2021    COLONOSCOPY WITH BIOPSY performed by Elaine Sloan MD at [facility] ENDOSCOPY    COLONOSCOPY,DIAGNOSTIC  10/4/2021         HX ANKLE FRACTURE TX      HX APPENDECTOMY      HX COLONOSCOPY      HX GYN      hysterectomy at age 28    HX ORTHOPAEDIC Left 2017    rotator cuff repair       Family History   Problem Relation Age of Onset    Dementia Mother     Breast Cancer Mother     Other Mother         POLIO AND COLITIS    Cancer Mother         Breast cancer, alzheimers    COPD Father     Migraines Child     Allergic Rhinitis Child     Sleep Apnea Child         Social History     Tobacco Use    Smoking status: Former     Packs/day: 1.00     Years: 8.00     Pack years: 8.00     Types: Cigarettes     Quit date: 1970     Years since quittin.0    Smokeless tobacco: Never   Vaping Use    Vaping Use: Never used   Substance Use Topics    Alcohol use: Not Currently     Alcohol/week: 0.0 standard drinks     Comment: very occasionally    Drug use: Never        Review of Systems   Musculoskeletal:  Positive for neck pain and neck stiffness. Negative for gait problem. Neurological:  Negative for weakness. Vitals:  Ht 5' 5\" (1.651 m)   Wt 114 lb (51.7 kg)   BMI 18.97 kg/m²    Body mass index is 18.97 kg/m². Physical Exam  Neurologic  Overall Assessment of Muscle Strength and Tone reveals  Upper Extremities - Right Deltoid - 5/5. Left Deltoid - 5/5. Right Bicep - 5/5. Left Bicep - 5/5. Right Tricep - 5/5. Left Tricep - 5/5. Right Wrist Extensors - 5/5. Left Wrist Extensors - 5/5. Right Wrist Flexors - 5/5. Left Wrist Flexors - 5/5. Right Intrinsics - 5/5. Left Intrinsics - 5/5. General Assessment of Reflexes  Right Hand - Begum's sign is negative in the right hand. Left Hand - Begum's sign is negative in the left hand. Reflexes (Dermatomes)  2/2 Normal - Left Bicep (C5-6), Left Tricep (C7-8), Left Brachioradialis (C5-6), Right Bicep (C5-6), Right Tricep (C7-8) and Right Brachioradialis (C5-6). Musculoskeletal  Global Assessment  Examination of related systems reveals - well-developed, well-nourished, in no acute distress, alert and oriented x 3 and normal coordination. Gait and Station - normal gait and station and normal posture.   Spine/Ribs/Pelvis  Cervical Spine - Evaluation of related systems reveals - no lymphadenopathy and neurovascularly intact bilaterally. Inspection and Palpation - Tenderness - moderate and localized. Assessment of pain reveals the following findings: - Location - cervical area, right greater than left. ROJM - Flexion - 85 °. Right Lateral Flexion - 35 °. Left Lateral Flexion - 35 °. Extension - 70 °. Right Rotation - 80 °. Left Rotation - 80 °. Cervical Spine - Functional Testing - Foraminal Compression/Spurling's Test negative, Shoulder Depression Test negative, Upper Limb Tension Test negative. Lumbosacral Spine - Examination of the lumbosacral spine reveals - no tenderness to palpation, no pain, normal strength and tone, no laxity or crepitus and normal lumbosacral spine movements. Dr. Cierra Gore was available for immediate consult during this encounter. An electronic signature was used to authenticate this note.   -- MARY Marcano

## 2022-08-17 DIAGNOSIS — M17.12 ARTHRITIS OF LEFT KNEE: Primary | ICD-10-CM

## 2022-08-19 ENCOUNTER — TELEPHONE (OUTPATIENT)
Dept: ORTHOPEDIC SURGERY | Age: 77
End: 2022-08-19

## 2022-08-19 NOTE — TELEPHONE ENCOUNTER
The MRI from [facility] was scanned into the system. The Report is on the PA desk with the last office note.  Please call patient with results

## 2022-08-25 NOTE — TELEPHONE ENCOUNTER
Called and left message that we received MRI. Patient is to continue with ESIs and massage therapy for now. She will contact if she would like to discuss possible surgical intervention.

## 2022-09-18 DIAGNOSIS — M17.12 PRIMARY OSTEOARTHRITIS OF LEFT KNEE: ICD-10-CM

## 2022-09-18 DIAGNOSIS — M54.81 OCCIPITAL NEURALGIA OF RIGHT SIDE: ICD-10-CM

## 2022-09-18 DIAGNOSIS — G44.86 CERVICOGENIC HEADACHE: ICD-10-CM

## 2022-09-19 RX ORDER — METHOCARBAMOL 750 MG/1
TABLET, FILM COATED ORAL
Qty: 30 TABLET | Refills: 0 | Status: SHIPPED | OUTPATIENT
Start: 2022-09-19

## 2022-09-19 RX ORDER — GABAPENTIN 100 MG/1
CAPSULE ORAL
Qty: 240 CAPSULE | Refills: 5 | Status: SHIPPED | OUTPATIENT
Start: 2022-09-19

## 2022-10-03 ENCOUNTER — OFFICE VISIT (OUTPATIENT)
Dept: ORTHOPEDIC SURGERY | Age: 77
End: 2022-10-03
Payer: MEDICARE

## 2022-10-03 VITALS — WEIGHT: 114 LBS | BODY MASS INDEX: 18.99 KG/M2 | HEIGHT: 65 IN

## 2022-10-03 DIAGNOSIS — M54.12 CERVICAL RADICULOPATHY: ICD-10-CM

## 2022-10-03 DIAGNOSIS — M25.511 TRIGGER POINT OF RIGHT SHOULDER REGION: Primary | ICD-10-CM

## 2022-10-03 PROCEDURE — 20552 NJX 1/MLT TRIGGER POINT 1/2: CPT | Performed by: ORTHOPAEDIC SURGERY

## 2022-10-03 RX ORDER — TRIAMCINOLONE ACETONIDE 40 MG/ML
40 INJECTION, SUSPENSION INTRA-ARTICULAR; INTRAMUSCULAR ONCE
Status: COMPLETED | OUTPATIENT
Start: 2022-10-03 | End: 2022-10-03

## 2022-10-03 RX ORDER — BUPIVACAINE HYDROCHLORIDE 7.5 MG/ML
1 INJECTION, SOLUTION EPIDURAL; RETROBULBAR ONCE
Status: COMPLETED | OUTPATIENT
Start: 2022-10-03 | End: 2022-10-03

## 2022-10-03 RX ADMIN — BUPIVACAINE HYDROCHLORIDE 7.5 MG: 7.5 INJECTION, SOLUTION EPIDURAL; RETROBULBAR at 12:12

## 2022-10-03 RX ADMIN — TRIAMCINOLONE ACETONIDE 40 MG: 40 INJECTION, SUSPENSION INTRA-ARTICULAR; INTRAMUSCULAR at 12:12

## 2022-10-03 NOTE — PROGRESS NOTES
Laverne Keys (: 1945) is a 69 y.o. female, established patient, here for evaluation of the following chief complaint(s):  Shoulder Pain       ASSESSMENT/PLAN:  Below is the assessment and plan developed based on review of pertinent history, physical exam, labs, studies, and medications. Findings were discussed with the patient today. She has had significant relief with previous trigger point injection of the right shoulder/scapular region. She elected to try today. She is following with our spine team and outside specialist to determine appropriate treatment of her cervical radiculopathy and she would like to avoid surgery if at all possible. She is also following with our knee replacement specialist for further treatment of her left knee arthritis and she eventually is planning for total knee arthroplasty. We discussed the risks and benefits of injection and informed consent was obtained. After a sterile preparation, 1 cc of bupivicaine and 40 mg of Kenalog were injected into the right parascapular region and an area of a trigger point. The patient tolerated the procedure well and there were no complications. Post injection pain, blood sugar elevation, skin discoloration, fatty atrophy and the signs of infection were discussed in detail. The patient was instructed to contact us if there were any questions or concerns prior to their follow up appointment. 1. Trigger point of right shoulder region  2. Cervical radiculopathy      Return if symptoms worsen or fail to improve. SUBJECTIVE/OBJECTIVE:  Laverne Keys (: 1945) is a 69 y.o. female. She notes increasing pain in the right parascapular region. She has been following with our spine specialist for her cervical radiculopathy and and wants to avoid surgery if at all possible. She is following with her pain management specialist and is coordinating epidural steroid injections in the future.   She also has some low back pain for which she will receive epidural injections. She has been following with our knee replacement specialist for discussion of left knee replacement. Allergies   Allergen Reactions    Cephalexin Other (comments)     Other reaction(s): Dermatological problems, e.g., rash, hives    Penicillins Rash     Other reaction(s): Dermatological problems, e.g., rash, hives  Other reaction(s): Dermatological problems, e.g., rash, hives      Aspirin Rash     Unable to take large dosages  Unable to take large dosages      Codeine Other (comments)     Rash, unable to take large dosages       Penicillamine Rash    Penicillamine Rash    Prednisone Other (comments)     Insomnia, frequent and heart racing    Synthroid [Levothyroxine] Rash and Itching     To breast area    Levothyroxine Itching and Rash     To breast area       Current Outpatient Medications   Medication Sig    methocarbamoL (ROBAXIN) 750 mg tablet TAKE 1 TABLET BY MOUTH EVERY NIGHT    gabapentin (NEURONTIN) 100 mg capsule TAKE 1 TO 2 CAPSULES BY MOUTH UP TO FOUR TIMES DAILY AS NEEDED FOR HEADACHE OR MIGRAINES    vit A/vit C/vit E/zinc/copper (PRESERVISION AREDS PO) Take  by mouth daily. butalbital-acetaminophen-caffeine (FIORICET, ESGIC) -40 mg per tablet TAKE 2 TABLETS BY MOUTH EVERY 8 HOURS AS NEEDED FOR HEADACHE    budesonide (ENTOCORT EC) 3 mg capsule TAKE 3 CAPSULES BY MOUTH DAILY    B.infantis-B.ani-B.long-B.bifi (Probiotic 4X) 10-15 mg TbEC Take  by mouth daily. thyroid, Pork, (ARMOUR) 90 mg tablet [place] Thyroid 90 mg tablet   Take 1 tablet every day by oral route. atorvastatin (LIPITOR) 10 mg tablet every evening. diclofenac EC (VOLTAREN) 75 mg EC tablet 75 mg two (2) times a day. conjugated estrogens (PREMARIN) 0.625 mg tablet Take 0.625 mg in the morning. 4 times weekly     No current facility-administered medications for this visit.        Social History     Socioeconomic History    Marital status:      Spouse name: Not on file    Number of children: Not on file    Years of education: Not on file    Highest education level: Not on file   Occupational History    Not on file   Tobacco Use    Smoking status: Former     Packs/day: 1.00     Years: 8.00     Pack years: 8.00     Types: Cigarettes     Quit date: 1970     Years since quittin.2    Smokeless tobacco: Never   Vaping Use    Vaping Use: Never used   Substance and Sexual Activity    Alcohol use: Not Currently     Alcohol/week: 0.0 standard drinks     Comment: very occasionally    Drug use: Never    Sexual activity: Yes     Partners: Male     Birth control/protection: None   Other Topics Concern    Not on file   Social History Narrative    Not on file     Social Determinants of Health     Financial Resource Strain: Not on file   Food Insecurity: Not on file   Transportation Needs: Not on file   Physical Activity: Not on file   Stress: Not on file   Social Connections: Not on file   Intimate Partner Violence: Not on file   Housing Stability: Not on file       Past Surgical History:   Procedure Laterality Date    COLONOSCOPY N/A 10/4/2021    COLONOSCOPY WITH BIOPSY performed by Debby Finley MD at [facility] ENDOSCOPY    COLONOSCOPY,DIAGNOSTIC  10/4/2021         HX ANKLE FRACTURE [address]      HX APPENDECTOMY      HX COLONOSCOPY      HX GYN      hysterectomy at age 28    HX ORTHOPAEDIC Left 2017    rotator cuff repair       Family History   Problem Relation Age of Onset    Dementia Mother     Breast Cancer Mother     Other Mother         POLIO AND COLITIS    Cancer Mother         Breast cancer, alzheimers    COPD Father     Migraines Child     Allergic Rhinitis Child     Sleep Apnea Child         OB History    No obstetric history on file. REVIEW OF SYSTEMS:  ROS    Positive for: Musculoskeletal  Last edited by Dionne Wesley on 10/3/2022 10:27 AM.        Patient denies any recent fever, chills, nausea, vomiting, chest pain, or shortness of breath.       Vitals:  Ht 5' 5\" (1.651 m)   Wt 114 lb (51.7 kg)   BMI 18.97 kg/m²    Body mass index is 18.97 kg/m². PHYSICAL EXAM:  General exam: Patient is awake, alert, and oriented x3. Well-appearing. No acute distress. Ambulates with a normal gait. Right shoulder: She has an obvious trigger point in the parascapular region near the muscle belly of the supraspinatus. No erythema or ecchymosis. She has excellent range of motion of the shoulder with some weakness on strength testing. Normal stability. IMAGING:    XR Results (most recent):  Results from Appointment encounter on 08/11/22    XR SPINE CERV 4 OR 5 V    Narrative  AP, lateral, flexion-extension views of the cervical spine reveal significant spondylosis and disc degeneration, worse at C4-7, with possible autofusion at C4-5. Anterolisthesis at C3-4 in flexion, measuring approximately 3 mm, which reduces to approximately 1 mm in extension. No evidence of obvious acute fractures or lytic lesions. Results from Appointment encounter on 06/06/22    XR KNEE LT MAX 2 VWS    Narrative  X-rays of the left knee 4 views done today show evidence of bone-on-bone joint space narrowing and early osteophyte formation at the medial compartment. Small area of calcium deposition noted at the lateral joint line      Results from Appointment encounter on 01/07/22    XR SACRUM AND COCCYX    Narrative  X-rays 3 views of the sacrum done today show evidence of a nondisplaced fracture at the distal sacrum/coccyx. No orders of the defined types were placed in this encounter. An electronic signature was used to authenticate this note.   -- Yuliana Larios DO

## 2022-10-03 NOTE — LETTER
10/3/2022    Patient: Ashok Lott   YOB: 1945   Date of Visit: 10/3/2022     Darshana Reich MD  [address] [address]  Via Fax: [phone]    Dear Darshana Reich MD,      Thank you for referring Ms. Nori Hanson to [facility] for evaluation. My notes for this consultation are attached. If you have questions, please do not hesitate to call me. I look forward to following your patient along with you.       Sincerely,    Raina Vidal, DO

## 2022-11-04 ENCOUNTER — OFFICE VISIT (OUTPATIENT)
Dept: ORTHOPEDIC SURGERY | Age: 77
End: 2022-11-04
Payer: MEDICARE

## 2022-11-04 VITALS — WEIGHT: 114 LBS | BODY MASS INDEX: 18.99 KG/M2 | HEIGHT: 65 IN

## 2022-11-04 DIAGNOSIS — M17.12 PRIMARY OSTEOARTHRITIS OF LEFT KNEE: ICD-10-CM

## 2022-11-04 DIAGNOSIS — M17.11 PRIMARY OSTEOARTHRITIS OF RIGHT KNEE: Primary | ICD-10-CM

## 2022-11-04 PROCEDURE — 99214 OFFICE O/P EST MOD 30 MIN: CPT | Performed by: ORTHOPAEDIC SURGERY

## 2022-11-04 PROCEDURE — G8432 DEP SCR NOT DOC, RNG: HCPCS | Performed by: ORTHOPAEDIC SURGERY

## 2022-11-04 PROCEDURE — G8420 CALC BMI NORM PARAMETERS: HCPCS | Performed by: ORTHOPAEDIC SURGERY

## 2022-11-04 PROCEDURE — 1123F ACP DISCUSS/DSCN MKR DOCD: CPT | Performed by: ORTHOPAEDIC SURGERY

## 2022-11-04 PROCEDURE — 1101F PT FALLS ASSESS-DOCD LE1/YR: CPT | Performed by: ORTHOPAEDIC SURGERY

## 2022-11-04 PROCEDURE — 20610 DRAIN/INJ JOINT/BURSA W/O US: CPT | Performed by: ORTHOPAEDIC SURGERY

## 2022-11-04 PROCEDURE — G8536 NO DOC ELDER MAL SCRN: HCPCS | Performed by: ORTHOPAEDIC SURGERY

## 2022-11-04 PROCEDURE — G8427 DOCREV CUR MEDS BY ELIG CLIN: HCPCS | Performed by: ORTHOPAEDIC SURGERY

## 2022-11-04 PROCEDURE — 1090F PRES/ABSN URINE INCON ASSESS: CPT | Performed by: ORTHOPAEDIC SURGERY

## 2022-11-04 PROCEDURE — G8400 PT W/DXA NO RESULTS DOC: HCPCS | Performed by: ORTHOPAEDIC SURGERY

## 2022-11-04 RX ORDER — BUPIVACAINE HYDROCHLORIDE 7.5 MG/ML
3 INJECTION, SOLUTION EPIDURAL; RETROBULBAR ONCE
Status: COMPLETED | OUTPATIENT
Start: 2022-11-04 | End: 2022-11-04

## 2022-11-04 RX ORDER — TRIAMCINOLONE ACETONIDE 40 MG/ML
40 INJECTION, SUSPENSION INTRA-ARTICULAR; INTRAMUSCULAR ONCE
Status: COMPLETED | OUTPATIENT
Start: 2022-11-04 | End: 2022-11-04

## 2022-11-04 RX ADMIN — TRIAMCINOLONE ACETONIDE 40 MG: 40 INJECTION, SUSPENSION INTRA-ARTICULAR; INTRAMUSCULAR at 14:52

## 2022-11-04 RX ADMIN — BUPIVACAINE HYDROCHLORIDE 22.5 MG: 7.5 INJECTION, SOLUTION EPIDURAL; RETROBULBAR at 14:51

## 2022-11-04 NOTE — PROGRESS NOTES
Marie Barnard (: 1945) is a 69 y.o. female, established patient, here for evaluation of the following chief complaint(s):  Knee Pain       ASSESSMENT/PLAN:  Below is the assessment and plan developed based on review of pertinent history, physical exam, labs, studies, and medications. She elected to try corticosteroid injection for the right knee today. We discussed the need to hold off on injection of the left knee as she has an upcoming surgery in the next 2 months. We discussed the risks and benefits of injection and informed consent was obtained. After a sterile preparation, 4 cc of bupivicaine and 40 mg of Kenalog were injected into the right knee. The patient tolerated the procedure well and there were no complications. Post injection pain, blood sugar elevation, skin discoloration, fatty atrophy and the signs of infection were discussed in detail. The patient was instructed to contact us if there were any questions or concerns prior to their follow up appointment. 1. Primary osteoarthritis of right knee  -     triamcinolone acetonide (KENALOG-40) 40 mg/mL injection 40 mg; 40 mg, Intra artICUlar, ONCE, 1 dose, On 22 at 1500  -     bupivacaine (PF) (MARCAINE) 0.75 % (7.5 mg/mL) injection 22.5 mg; 22.5 mg (3 mL), Intra artICUlar, ONCE, 1 dose, On 22 at 1500  2. Primary osteoarthritis of left knee      Return in about 3 months (around 2023), or if symptoms worsen or fail to improve. SUBJECTIVE/OBJECTIVE:  Marie Barnard (: 1945) is a 69 y.o. female. See notes recurrent aching pain in the bilateral knees. She has an upcoming left partial knee replacement in January. She notes clicking and aching pain.   Left knee remains more painful than the right        Allergies   Allergen Reactions    Cephalexin Other (comments)     Other reaction(s): Dermatological problems, e.g., rash, hives    Penicillins Rash     Other reaction(s): Dermatological problems, e.g., rash, hives  Other reaction(s): Dermatological problems, e.g., rash, hives      Aspirin Rash     Unable to take large dosages  Unable to take large dosages      Codeine Other (comments)     Rash, unable to take large dosages       Penicillamine Rash    Penicillamine Rash    Prednisone Other (comments)     Insomnia, frequent and heart racing    Synthroid [Levothyroxine] Rash and Itching     To breast area    Levothyroxine Itching and Rash     To breast area       Current Outpatient Medications   Medication Sig    methocarbamoL (ROBAXIN) 750 mg tablet TAKE 1 TABLET BY MOUTH EVERY NIGHT    gabapentin (NEURONTIN) 100 mg capsule TAKE 1 TO 2 CAPSULES BY MOUTH UP TO FOUR TIMES DAILY AS NEEDED FOR HEADACHE OR MIGRAINES    vit A/vit C/vit E/zinc/copper (PRESERVISION AREDS PO) Take  by mouth daily. butalbital-acetaminophen-caffeine (FIORICET, ESGIC) -40 mg per tablet TAKE 2 TABLETS BY MOUTH EVERY 8 HOURS AS NEEDED FOR HEADACHE    budesonide (ENTOCORT EC) 3 mg capsule TAKE 3 CAPSULES BY MOUTH DAILY    B.infantis-B.ani-B.long-B.bifi (Probiotic 4X) 10-15 mg TbEC Take  by mouth daily. thyroid, Pork, (ARMOUR) 90 mg tablet Mehama Thyroid 90 mg tablet   Take 1 tablet every day by oral route. atorvastatin (LIPITOR) 10 mg tablet every evening. diclofenac EC (VOLTAREN) 75 mg EC tablet 75 mg two (2) times a day. conjugated estrogens (PREMARIN) 0.625 mg tablet Take 0.625 mg in the morning.  4 times weekly     Current Facility-Administered Medications   Medication    triamcinolone acetonide (KENALOG-40) 40 mg/mL injection 40 mg    bupivacaine (PF) (MARCAINE) 0.75 % (7.5 mg/mL) injection 22.5 mg       Social History     Socioeconomic History    Marital status:      Spouse name: Not on file    Number of children: Not on file    Years of education: Not on file    Highest education level: Not on file   Occupational History    Not on file   Tobacco Use    Smoking status: Former     Packs/day: 1.00     Years: 8.00 Pack years: 8.00     Types: Cigarettes     Quit date: 1970     Years since quittin.3    Smokeless tobacco: Never   Vaping Use    Vaping Use: Never used   Substance and Sexual Activity    Alcohol use: Not Currently     Alcohol/week: 0.0 standard drinks     Comment: very occasionally    Drug use: Never    Sexual activity: Yes     Partners: Male     Birth control/protection: None   Other Topics Concern    Not on file   Social History Narrative    Not on file     Social Determinants of Health     Financial Resource Strain: Not on file   Food Insecurity: Not on file   Transportation Needs: Not on file   Physical Activity: Not on file   Stress: Not on file   Social Connections: Not on file   Intimate Partner Violence: Not on file   Housing Stability: Not on file       Past Surgical History:   Procedure Laterality Date    COLONOSCOPY N/A 10/4/2021    COLONOSCOPY WITH BIOPSY performed by Sohail Meyer MD at [facility] ENDOSCOPY    COLONOSCOPY,DIAGNOSTIC  10/4/2021         HX ANKLE FRACTURE [address]      HX APPENDECTOMY      HX COLONOSCOPY      HX GYN      hysterectomy at age 28    HX ORTHOPAEDIC Left 2017    rotator cuff repair       Family History   Problem Relation Age of Onset    Dementia Mother     Breast Cancer Mother     Other Mother         POLIO AND COLITIS    Cancer Mother         Breast cancer, alzheimers    COPD Father     Migraines Child     Allergic Rhinitis Child     Sleep Apnea Child         OB History    No obstetric history on file. REVIEW OF SYSTEMS:  ROS    Positive for: Musculoskeletal  Last edited by Bindu Barksdale on 2022  1:41 PM.        Patient denies any recent fever, chills, nausea, vomiting, chest pain, or shortness of breath. Vitals:  Ht 5' 5\" (1.651 m)   Wt 114 lb (51.7 kg)   BMI 18.97 kg/m²    Body mass index is 18.97 kg/m². PHYSICAL EXAM:  General exam: Patient is awake, alert, and oriented x3. Well-appearing. No acute distress.   Ambulates with an antalgic gait    Bilateral knees: Neurovascular and sensory intact. Effusion is present. Crepitus is noted with range of motion of both knees. There is tenderness palpation at the medial  joint lines. Eda's maneuver creates mild pain. Normal stability on ligamentous testing including Lachman's exam.  Stable anterior and posterior drawer. No erythema or ecchymosis. IMAGING:    XR Results (most recent):  Results from Appointment encounter on 08/11/22    XR SPINE CERV 4 OR 5 V    Narrative  AP, lateral, flexion-extension views of the cervical spine reveal significant spondylosis and disc degeneration, worse at C4-7, with possible autofusion at C4-5. Anterolisthesis at C3-4 in flexion, measuring approximately 3 mm, which reduces to approximately 1 mm in extension. No evidence of obvious acute fractures or lytic lesions. Results from Appointment encounter on 06/06/22    XR KNEE LT MAX 2 VWS    Narrative  X-rays of the left knee 4 views done today show evidence of bone-on-bone joint space narrowing and early osteophyte formation at the medial compartment. Small area of calcium deposition noted at the lateral joint line      Results from Appointment encounter on 01/07/22    XR SACRUM AND COCCYX    Narrative  X-rays 3 views of the sacrum done today show evidence of a nondisplaced fracture at the distal sacrum/coccyx. Orders Placed This Encounter    triamcinolone acetonide (KENALOG-40) 40 mg/mL injection 40 mg    bupivacaine (PF) (MARCAINE) 0.75 % (7.5 mg/mL) injection 22.5 mg              An electronic signature was used to authenticate this note.   -- Emerald Grullon DO

## 2022-11-04 NOTE — LETTER
11/4/2022    Patient: Arline Garcia   YOB: 1945   Date of Visit: 11/4/2022     Denisha Fabian MD  [facility]  [facility] [address]  [address] [facility] [address]  Via Fax: [phone]    Dear Denisha Fabian MD,      Thank you for referring Ms. Susi Bartholomew to [facility] for evaluation. My notes for this consultation are attached. If you have questions, please do not hesitate to call me. I look forward to following your patient along with you.       Sincerely,    Dipak Luna, DO

## 2023-01-05 ENCOUNTER — VIRTUAL VISIT (OUTPATIENT)
Dept: NEUROLOGY | Age: 78
End: 2023-01-05
Payer: MEDICARE

## 2023-01-05 DIAGNOSIS — G43.109 MIGRAINE WITH AURA, NOT INTRACTABLE, WITHOUT STATUS MIGRAINOSUS: ICD-10-CM

## 2023-01-05 DIAGNOSIS — G44.86 CERVICOGENIC HEADACHE: ICD-10-CM

## 2023-01-05 DIAGNOSIS — M54.81 OCCIPITAL NEURALGIA OF RIGHT SIDE: Primary | ICD-10-CM

## 2023-01-05 PROCEDURE — G8427 DOCREV CUR MEDS BY ELIG CLIN: HCPCS | Performed by: PSYCHIATRY & NEUROLOGY

## 2023-01-05 PROCEDURE — G8420 CALC BMI NORM PARAMETERS: HCPCS | Performed by: PSYCHIATRY & NEUROLOGY

## 2023-01-05 PROCEDURE — 1090F PRES/ABSN URINE INCON ASSESS: CPT | Performed by: PSYCHIATRY & NEUROLOGY

## 2023-01-05 PROCEDURE — 1101F PT FALLS ASSESS-DOCD LE1/YR: CPT | Performed by: PSYCHIATRY & NEUROLOGY

## 2023-01-05 PROCEDURE — 99214 OFFICE O/P EST MOD 30 MIN: CPT | Performed by: PSYCHIATRY & NEUROLOGY

## 2023-01-05 PROCEDURE — G8432 DEP SCR NOT DOC, RNG: HCPCS | Performed by: PSYCHIATRY & NEUROLOGY

## 2023-01-05 PROCEDURE — G8536 NO DOC ELDER MAL SCRN: HCPCS | Performed by: PSYCHIATRY & NEUROLOGY

## 2023-01-05 PROCEDURE — G8400 PT W/DXA NO RESULTS DOC: HCPCS | Performed by: PSYCHIATRY & NEUROLOGY

## 2023-01-05 PROCEDURE — 1123F ACP DISCUSS/DSCN MKR DOCD: CPT | Performed by: PSYCHIATRY & NEUROLOGY

## 2023-01-05 RX ORDER — BISMUTH SUBSALICYLATE 262 MG
1 TABLET,CHEWABLE ORAL DAILY
COMMUNITY

## 2023-01-05 RX ORDER — CHOLECALCIFEROL (VITAMIN D3) 125 MCG
CAPSULE ORAL DAILY
COMMUNITY

## 2023-01-05 RX ORDER — LANOLIN ALCOHOL/MO/W.PET/CERES
3000 CREAM (GRAM) TOPICAL DAILY
COMMUNITY

## 2023-01-05 NOTE — ASSESSMENT & PLAN NOTE
Exacerbation seem to correlate with the cervicogenic headaches related to cervical degeneration and occipital neuralgia  Overall this is managed outside of our office through pain management

## 2023-01-05 NOTE — ASSESSMENT & PLAN NOTE
Predominantly precipitated by right occipital neuralgia and is followed by pain management  Uses butalbital presently more frequently due to exacerbation of the right occipital nerve pain    When the occipital neuralgia is well controlled she is generally just using a butalbital about 1/2 to 1 tablet/day

## 2023-01-05 NOTE — PATIENT INSTRUCTIONS
As per discussion    Continue with the Fioricet as needed right now you are having a flareup of the occipital neuralgia which is exacerbating your need for the Fioricet please try not to take more than 4 tablets/day  There is still limitation to 50/month    Hopefully once the occipital neuralgia gets back under control you get back to your baseline of needing just 1/2 to 1 tablet/day      Office Policies      Appointments  Please make sure that you arrive for your next appointment at least 15 minutes prior to your appointment time. If for some reason you are going to be late please notify the office to determine if you need to be rescheduled or we can adjust your appointment time      Phone calls/patient messages:  Please allow up to 24 hours for someone in the office to contact you about your call or message. Be mindful your provider may be out of the office or your message may require further review. We encourage you to use [employer] for your messages as this is a faster, more efficient way to communicate with our office    Medication Refills:  Prescription medications require up to 48 business hours to process. We encourage you to use [employer] for your refills. For controlled medications: Please allow up to 72 business hours to process. Certain medications may require you to  a written prescription at our office. NO narcotic/controlled medications will be prescribed after 4pm Monday through Friday or on weekends    Form/Paperwork Completion:  We ask that you allow 7-14 business days. You may also download your forms to [employer] to have your doctor print off.

## 2023-01-05 NOTE — PROGRESS NOTES
[facility]  TELEHEALTH Virtual FOLLOW-UP VISIT        Chirag Isbell is a 69 y.o. female who was seen by synchronous (real-time) audio-video technology on 1/5/2023. Chirag Isbell is a 69 y.o. female who presents today for the following:  Chief Complaint   Patient presents with    Migraine     C/O increasing migraines, occurring daily. Call on [phone]. Pulse 68. ASSESSMENT AND PLAN  1. Occipital neuralgia of right side  Assessment & Plan:   Presently extremely exacerbated  Patient is trying to get in with her pain management team for an occipital nerve block  2. Cervicogenic headache  Assessment & Plan:   Predominantly precipitated by right occipital neuralgia and is followed by pain management  Uses butalbital presently more frequently due to exacerbation of the right occipital nerve pain    When the occipital neuralgia is well controlled she is generally just using a butalbital about 1/2 to 1 tablet/day      3. Migraine with aura, not intractable, without status migrainosus  Assessment & Plan:   Exacerbation seem to correlate with the cervicogenic headaches related to cervical degeneration and occipital neuralgia  Overall this is managed outside of our office through pain management    Patient and/or family was given time to ask questions and voice concerns. I believe all questions concerns were adequately addressed at this  office visit. Patient and/or family also verbalized agreement and understanding of the above-stated plan    Continue to follow-up with all providers as appropriate to include orthopedics pain management and primary care        ICD-10-CM ICD-9-CM    1. Occipital neuralgia of right side  M54.81 723.8       2. Cervicogenic headache  G44.86 784.0       3.  Migraine with aura, not intractable, without status migrainosus  G43.109 346.00                 HPI  Historical Data  Patient is known to the practice and has been previously seen by Dr. 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 Rosemary Clarke     Interim Data:      Headaches and migraines    Worse: due to needing a nerve block Rt side  Having difficulty getting in for the nerve blocks with her pain management doctor but per patient they are aware she is trying to get this in before her upcoming surgery on 23 January [for her knee]    Using Fioricet 1 qid  Gabapentin not helpful so stopped using it  She is using topicals such as Voltaren gel and CBD topicals    Normally when her occipital neuralgia is well controlled  Using butalbital about  1/2-1BID; usually 1/2 tab ; patient is limited to 50/month and states that she generally does not use all 50 tablets in a month's time    Occasionally taking gabapentin up to 2 times/day but sometimes causes muscle spasm so she pretty much avoid using that unless absolutely necessary    Additionally she has used massage and traction and normally finds this helpful    Still with daily headache but not lasting all day [was 24/7]    Mostly on RT side with the occipital neuralgia    Usually wakes at 4 or 5 AM with a headache and uses Butalbital    Patient's last occipital nerve block was August 2, 2022    Still getting spinal injections about every 6 months   Sees Dr Rosemary Clarke for pain management   Has an appointment to see Dr. Aster Villegas due to worsening lumbar disease      Cannot tolerate: cymbalta zanaflex    Other:  Scheduled to have left knee arthroscopic surgery 1/16/2023      Allergies   Allergen Reactions    Cephalexin Other (comments)     Other reaction(s): Dermatological problems, e.g., rash, hives    Penicillins Rash     Other reaction(s): Dermatological problems, e.g., rash, hives  Other reaction(s): Dermatological problems, e.g., rash, hives      Aspirin Rash     Unable to take large dosages  Unable to take large dosages      Codeine Other (comments)     Rash, unable to take large dosages       Penicillamine Rash    Penicillamine Rash    Prednisone Other (comments)     Insomnia, frequent and heart racing    Synthroid [Levothyroxine] Rash and Itching     To breast area    Levothyroxine Itching and Rash     To breast area       Current Outpatient Medications   Medication Sig    cholecalciferol, vitamin D3, (Vitamin D3) 50 mcg (2,000 unit) tab Take  by mouth daily. cyanocobalamin (Vitamin B-12) 1,000 mcg tablet Take 3,000 mcg by mouth daily. multivitamin (ONE A DAY) tablet Take 1 Tablet by mouth daily. vit A/vit C/vit E/zinc/copper (PRESERVISION AREDS PO) Take  by mouth daily. butalbital-acetaminophen-caffeine (FIORICET, ESGIC) -40 mg per tablet TAKE 2 TABLETS BY MOUTH EVERY 8 HOURS AS NEEDED FOR HEADACHE    budesonide (ENTOCORT EC) 3 mg capsule TAKE 3 CAPSULES BY MOUTH DAILY    B.infantis-B.ani-B.long-B.bifi (Probiotic 4X) 10-15 mg TbEC Take  by mouth daily. thyroid, Pork, (ARMOUR) 90 mg tablet [place] Thyroid 90 mg tablet   Take 1 tablet every day by oral route. atorvastatin (LIPITOR) 10 mg tablet every evening. diclofenac EC (VOLTAREN) 75 mg EC tablet 75 mg two (2) times a day. conjugated estrogens (PREMARIN) 0.625 mg tablet 0.625 mg daily. 3 times weekly     No current facility-administered medications for this visit.        Past medical history/surgical history, family history, and social history have been reviewed for today's visit      ROS    A ten system review of constitutional, cardiovascular, respiratory, musculoskeletal, endocrine, skin, SHEENT, genitourinary, psychiatric and neurologic systems was obtained and is unremarkable except as mentioned under HPI          EXAMINATION: within the context of a virtual visit  General appearance: Patient is well-developed and well-nourished in no apparent distress and well groomed.     Psych/mental health:  Affect: Appropriate    PHQ  3 most recent PHQ Screens 1/5/2023   Little interest or pleasure in doing things Not at all   Feeling down, depressed, irritable, or hopeless Not at all   Total Score PHQ 2 0   Trouble falling or staying asleep, or sleeping too much -   Feeling tired or having little energy -   Poor appetite, weight loss, or overeating -   Feeling bad about yourself - or that you are a failure or have let yourself or your family down -   Trouble concentrating on things such as school, work, reading, or watching TV -   Moving or speaking so slowly that other people could have noticed; or the opposite being so fidgety that others notice -   Thoughts of being better off dead, or hurting yourself in some way -   PHQ 9 Score -   How difficult have these problems made it for you to do your work, take care of your home and get along with others -       HEENT:   Normocephalic  With evidence of trauma: No  Full range of motion head neck: Yes  Tenderness to palpation of the head neck region: RT side VERY tender [per pt report]      Cardiovascular:     Extremities warm to touch: N/A  Extremity swelling: No  Discoloration: No  Evidence of PVD: No    Respiratory:   Dyspnea on exertion: No   Abnormal effort on casual observation: No   Use of portable oxygen: No   Evidence of cyanosis: No     Musculoskeletal:   Evidence of significant bone deformities: No   Spinal curvature: No     Integumentary:    Obvious bruising: No   Lacerations or discoloration on casual observation: No       Neurological Examination:   Mental Status:        MMSE  No flowsheet data found. Formal testing was not completed    there was nothing concerning on general observation and discussion. Alert oriented and appropriate to general conversation  Normal processing on general observation  Followed conversation and responded seemingly appropriate throughout the office visit  No word finding difficulties noted on casual observation  Able to follow directions without difficulty     Cranial Nerves:    Grossly intact    Motor:   WNL    Sensation: Not tested    Coordination/Cerebellar:   Grossly intact    Gait: Not tested    Fall risk assessment  Fall Risk Assessment, last 12 mths 7/26/2022   Able to walk? Yes   Fall in past 12 months? 0   Do you feel unsteady? -   Are you worried about falling -           Due to this being a TeleHealth evaluation, many elements of the physical examination are unable to be assessed. Pursuant to the emergency declaration under the [facility], [facility] waiver authority and the [employer] and [employer] Act, this Virtual  Visit was conducted, with patient's consent, to reduce the patient's risk of exposure to COVID-19 and provide continuity of care for an established patient. Services were provided through a video synchronous discussion virtually to substitute for in-person clinic visit. Follow-up and Dispositions    Return in about 6 months (around 7/5/2023) for Virtual visit.                Teto Burton MS, ANP-BC, [facility]

## 2023-01-05 NOTE — ASSESSMENT & PLAN NOTE
Presently extremely exacerbated  Patient is trying to get in with her pain management team for an occipital nerve block

## 2023-01-09 ENCOUNTER — HOSPITAL ENCOUNTER (OUTPATIENT)
Dept: PREADMISSION TESTING | Age: 78
Discharge: HOME | End: 2023-01-09
Payer: MEDICARE

## 2023-01-09 VITALS
DIASTOLIC BLOOD PRESSURE: 79 MMHG | SYSTOLIC BLOOD PRESSURE: 180 MMHG | TEMPERATURE: 97.4 F | HEIGHT: 65 IN | HEART RATE: 55 BPM | WEIGHT: 108 LBS | BODY MASS INDEX: 17.99 KG/M2

## 2023-01-09 LAB
ABO + RH BLD: NORMAL
ANION GAP SERPL CALC-SCNC: 4 MMOL/L (ref 5–15)
APPEARANCE UR: CLEAR
ATRIAL RATE: 54 BPM
BACTERIA URNS QL MICRO: ABNORMAL /HPF
BILIRUB UR QL: NEGATIVE
BLOOD GROUP ANTIBODIES SERPL: NORMAL
BUN SERPL-MCNC: 10 MG/DL (ref 6–20)
BUN/CREAT SERPL: 14 (ref 12–20)
CALCIUM SERPL-MCNC: 9.1 MG/DL (ref 8.5–10.1)
CALCULATED P AXIS, ECG09: 67 DEGREES
CALCULATED R AXIS, ECG10: -38 DEGREES
CALCULATED T AXIS, ECG11: 47 DEGREES
CHLORIDE SERPL-SCNC: 97 MMOL/L (ref 97–108)
CO2 SERPL-SCNC: 32 MMOL/L (ref 21–32)
COLOR UR: ABNORMAL
CREAT SERPL-MCNC: 0.71 MG/DL (ref 0.55–1.02)
DIAGNOSIS, 93000: NORMAL
EPITH CASTS URNS QL MICRO: ABNORMAL /LPF
ERYTHROCYTE [DISTWIDTH] IN BLOOD BY AUTOMATED COUNT: 12.8 % (ref 11.5–14.5)
EST. AVERAGE GLUCOSE BLD GHB EST-MCNC: 108 MG/DL
GLUCOSE SERPL-MCNC: 129 MG/DL (ref 65–100)
GLUCOSE UR STRIP.AUTO-MCNC: NEGATIVE MG/DL
HBA1C MFR BLD: 5.4 % (ref 4–5.6)
HCT VFR BLD AUTO: 34.6 % (ref 35–47)
HGB BLD-MCNC: 11.3 G/DL (ref 11.5–16)
HGB UR QL STRIP: NEGATIVE
INR PPP: 1 (ref 0.9–1.1)
KETONES UR QL STRIP.AUTO: NEGATIVE MG/DL
LEUKOCYTE ESTERASE UR QL STRIP.AUTO: ABNORMAL
MCH RBC QN AUTO: 31.5 PG (ref 26–34)
MCHC RBC AUTO-ENTMCNC: 32.7 G/DL (ref 30–36.5)
MCV RBC AUTO: 96.4 FL (ref 80–99)
NITRITE UR QL STRIP.AUTO: NEGATIVE
NRBC # BLD: 0 K/UL (ref 0–0.01)
NRBC BLD-RTO: 0 PER 100 WBC
P-R INTERVAL, ECG05: 184 MS
PH UR STRIP: 7 (ref 5–8)
PLATELET # BLD AUTO: 229 K/UL (ref 150–400)
PMV BLD AUTO: 9 FL (ref 8.9–12.9)
POTASSIUM SERPL-SCNC: 4.1 MMOL/L (ref 3.5–5.1)
PROT UR STRIP-MCNC: NEGATIVE MG/DL
PROTHROMBIN TIME: 10 SEC (ref 9–11.1)
Q-T INTERVAL, ECG07: 422 MS
QRS DURATION, ECG06: 88 MS
QTC CALCULATION (BEZET), ECG08: 400 MS
RBC # BLD AUTO: 3.59 M/UL (ref 3.8–5.2)
RBC #/AREA URNS HPF: ABNORMAL /HPF (ref 0–5)
SODIUM SERPL-SCNC: 133 MMOL/L (ref 136–145)
SP GR UR REFRACTOMETRY: 1.01 (ref 1–1.03)
SPECIMEN EXP DATE BLD: NORMAL
UA: UC IF INDICATED,UAUC: ABNORMAL
UROBILINOGEN UR QL STRIP.AUTO: 0.2 EU/DL (ref 0.2–1)
VENTRICULAR RATE, ECG03: 54 BPM
WBC # BLD AUTO: 7 K/UL (ref 3.6–11)
WBC URNS QL MICRO: ABNORMAL /HPF (ref 0–4)

## 2023-01-09 PROCEDURE — 93005 ELECTROCARDIOGRAM TRACING: CPT

## 2023-01-09 PROCEDURE — 36415 COLL VENOUS BLD VENIPUNCTURE: CPT

## 2023-01-09 PROCEDURE — 87086 URINE CULTURE/COLONY COUNT: CPT

## 2023-01-09 PROCEDURE — 81001 URINALYSIS AUTO W/SCOPE: CPT

## 2023-01-09 PROCEDURE — 86900 BLOOD TYPING SEROLOGIC ABO: CPT

## 2023-01-09 PROCEDURE — 83036 HEMOGLOBIN GLYCOSYLATED A1C: CPT

## 2023-01-09 PROCEDURE — 85027 COMPLETE CBC AUTOMATED: CPT

## 2023-01-09 PROCEDURE — 80048 BASIC METABOLIC PNL TOTAL CA: CPT

## 2023-01-09 PROCEDURE — 85610 PROTHROMBIN TIME: CPT

## 2023-01-09 RX ORDER — GABAPENTIN 100 MG/1
100-200 CAPSULE ORAL
COMMUNITY

## 2023-01-09 NOTE — PERIOP NOTES
[address] [facility] INSTRUCTIONS  ORTHOPAEDIC    Surgery Date:   1/16/23    Your surgeon's office or [facility] staff will call you between 4 PM- 8 PM the day before surgery with your arrival time. If your surgery is on a Monday, you will receive a call the preceding Friday. Please report to [facility] Patient Access/Admitting on the 1st floor. Bring your insurance card, photo identification, and any copayment (if applicable). If you are going home the same day of your surgery, you must have a responsible adult to drive you home. You need to have a responsible adult to stay with you the first 24 hours after surgery and you should not drive a car for 24 hours following your surgery. Do NOT eat any solid foods after midnight the night before surgery including candy, mints or gum. You may drink clear liquids from midnight until 1 hour prior to arrival time. You may drink up to 12 ounces at one time every 4 hours. Do NOT drink alcohol or smoke 24 hours before surgery. STOP smoking for 14 days prior as it helps with breathing and healing after surgery. If your arrival time is 3pm or later, you may eat a light breakfast before 8am (toast, bagel-no butter, black coffee, plain tea, fruit juice-no pulp) Please note special instructions, if applicable, below for medications. If you are being admitted to the hospital,please leave personal belongings/luggage in your car until you have an assigned hospital room number. Please wear comfortable clothes. Wear your glasses instead of contacts. We ask that all money, jewelry and valuables be left at home. Wear no make up, particularly mascara, the day of surgery. All body piercings, rings, and jewelry need to be removed and left at home. Please remove any nail polish or artificial nails from your fingernails. Please wear your hair loose or down. Please no pony-tails, buns, or any metal hair accessories.  If you shower the morning of surgery, please do not apply any lotions or powders afterwards. You may wear deodorant. Do not shave any body area within 24 hours of your surgery. Please follow all instructions to avoid any potential surgical cancellation. Should your physical condition change, (i.e. fever, cold, flu, etc.) please notify your surgeon as soon as possible. It is important to be on time. If a situation occurs where you may be delayed, please call:  [phone] / [phone] on the day of surgery. The Preadmission Testing staff can be reached at [phone]. Special instructions: [address] [facility]    Current Outpatient Medications   Medication Sig    gabapentin (NEURONTIN) 100 mg capsule Take  by mouth four (4) times daily. cholecalciferol, vitamin D3, 50 mcg (2,000 unit) tab Take  by mouth daily. cyanocobalamin 1,000 mcg tablet Take 3,000 mcg by mouth daily. multivitamin (ONE A DAY) tablet Take 1 Tablet by mouth daily. vit A/vit C/vit E/zinc/copper (PRESERVISION AREDS PO) Take  by mouth daily. butalbital-acetaminophen-caffeine (FIORICET, ESGIC) -40 mg per tablet TAKE 2 TABLETS BY MOUTH EVERY 8 HOURS AS NEEDED FOR HEADACHE    budesonide (ENTOCORT EC) 3 mg capsule TAKE 3 CAPSULES BY MOUTH DAILY    B.infantis-B.ani-B.long-B.bifi (Probiotic 4X) 10-15 mg TbEC Take  by mouth daily. thyroid, Pork, (ARMOUR) 90 mg tablet [place] Thyroid 90 mg tablet   Take 1 tablet every day by oral route. atorvastatin (LIPITOR) 10 mg tablet every evening. diclofenac EC (VOLTAREN) 75 mg EC tablet 75 mg two (2) times a day. conjugated estrogens (PREMARIN) 0.625 mg tablet 0.625 mg daily. 3 times weekly     No current facility-administered medications for this encounter.        YOU MUST ONLY TAKE THESE MEDICATIONS THE MORNING OF SURGERY WITH A SIP OF WATER: GABAPENTIN, BUDESONIDE, ARMOUR, ATORVASTATIN, PREMARIN    MEDICATIONS TO TAKE THE MORNING OF SURGERY ONLY IF NEEDED: FIORICET    HOLD these prescription medications BEFORE Surgery: STOP AREDS TODAY; STOP DICLOFENAC ON 1/13/23    Ask your surgeon/prescribing physician about when/if to STOP taking these medications: NONE    Stop any non-steroidal anti-inflammatory drugs (i.e. Ibuprofen, Naproxen, Advil, Aleve) 3 days before surgery. You may take Tylenol. STOP all vitamins and herbal supplements 1 week prior to  surgery. If you are currently taking Plavix, Coumadin, or any other blood-thinning/anticoagulant medication contact your prescribing physician for instructions. Preventing Infections Before and After - Your Surgery    IMPORTANT INSTRUCTIONS    You play an important role in your health and preparation for surgery. To reduce the germs on your skin you will need to shower with CHG soap (Chorhexidine gluconate 4%) two times before surgery. CHG soap (Hibiclens, Hex-A-Clens or store brand)  CHG soap will be provided at your Preadmission Testing (PAT) appointment. If you do not have a PAT appointment before surgery, you may arrange to  CHG soap from our office or purchase CHG soap at a pharmacy, grocery or department store. You need to purchase TWO 4 ounce bottles to use for your 2 showers. Steps to follow:  Fredrich Pen your hair with your normal shampoo and your body with regular soap and rinse well to remove shampoo and soap from your skin. Wet a clean washcloth and turn off the shower. Put CHG soap on washcloth and apply to your entire body from the neck down. Do not use on your head, face or private parts(genitals). Do not use CHG soap on open sores, wounds or areas of skin irritation. Wash you body gently for 5 minutes. Do not wash your skin too hard. This soap does not create lather. Pay special attention to your underarms and from your belly button to your feet. Turn the shower back on and rinse well to get CHG soap off your body. Pat your skin dry with a clean, dry towel. Do not apply lotions or moisturizer.   Put on clean clothes and sleep on fresh bed sheets and do not allow pets to sleep with you. Shower with CHG soap 2 times before your surgery  The evening before your surgery  The morning of your surgery      Tips to help prevent infections after your surgery:  Protect your surgical wound from germs:  Hand washing is the most important thing you and your caregivers can do to prevent infections. Keep your bandage clean and dry! Do not touch your surgical wound. Use clean, freshly washed towels and washcloths every time you shower; do not share bath linens with others. Until your surgical wound is healed, wear clothing and sleep on bed linens each day that are clean and freshly washed. Do not allow pets to sleep in your bed with you or touch your surgical wound. Do not smoke - smoking delays wound healing. This may be a good time to stop smoking. If you have diabetes, it is important for you to manage your blood sugar levels properly before your surgery as well as after your surgery. Poorly managed blood sugar levels slow down wound healing and prevent you from healing completely. Prevention of Infection  Testing for Staphylococcus aureus on your skin before surgery    Staphylococcus aureus (staph) is a common bacteria that is found on the body. It normally does not cause infection on healthy skin. Before surgery, you will be tested to see if you have staph by swabbing the inside of your nose. When you have an incision with surgery, the goal is to protect that incision from infection. Removal of the staph bacteria before surgery can decrease the risk of a surgical site infection. If your nose swab is positive for staph you will be called. Your treatment will include 2 steps:  Prescription for Mupirocin ointment to be used in each nostril twice a day for 5 days. Showering with Chlorhexidine (CHG) liquid soap for 5 days prior to surgery. How to use Mupirocin ointment in your nose   the prescription from your pharmacy.  You will receive a large tube of ointment which will be big enough for all of your treatments. You will apply this ointment to each nostril 2 times a day for 5 days. Wash your hands with  gel or soap and water for 20 seconds before using ointment. Place a pea-sized amount of ointment on a cotton Q-tip. Apply ointment just inside of each nostril with the Q-tip. Do not push Q-tip or ointment deep inside you nose. Press your nostrils together and massage for a few seconds. Wash your hands with  gel or soap and water after you are finished. Do not get ointment near your eyes. If it gets into your eyes, rinse them with cool water. If you need to use nasal spray, clean the tip of the bottle with alcohol before use and do not use both at the same time. If you are scheduled for COVID testing during the 5 days, do NOT apply morning dose until after the COVID test has been performed. How to use Chlorhexidine (CHG) 4% liquid soap  Purchase an 8 ounce bottle of CHG liquid soap (Chlorhexidine 4%, Hibiclens, Hex-A-Clens or store brand) at a pharmacy or grocery store. Wash your hair with your normal shampoo and your body with regular soap and rinse well to remove shampoo and soap from your skin. Wet a clean washcloth and turn off the shower. Put CHG soap on washcloth and apply to your entire body from the neck down. Do not use on your head, face or private parts(genitals). Do not use CHG soap on open sores, wounds or areas of skin irritation. Wash your body gently for 5 minutes. Do not wash your skin too hard. This soap does not create lather. Pay special attention to your underarms and from your belly button to your feet. Turn the shower back on and rinse well to get CHG soap off your body. Pat your skin dry with a clean, dry towel. Do not apply lotions or moisturizer. Put on clean clothes and sleep on fresh bed sheets the night before surgery. Do not allow pets to sleep with you.     Eating and Drinking Before Surgery    You may eat a regular dinner at the usual time on the day before your surgery. Do NOT eat any solid foods after midnight unless your arrival time at the hospital is 3pm or later. You may drink clear liquids only from 12 midnight until 1 hours prior to your arrival time at the hospital on the day of your surgery. Do NOT drink alcohol. Clear liquids include:  Water  Fruit juices without pulp( i.e. apple juice)  Carbonated beverages  Black coffee (no cream/milk)  Tea (no cream/milk)  Gatorade  You may drink up to 12-16 ounces at one time every 4 hours between the hours of midnight and 1 hour before your arrival time at the hospital. Example- if your arrival time at the hospital is 6am, you may drink 12-16 ounces of clear liquids no later than 5am.  If your arrival time at the hospital is 3pm or later, you may eat a light breakfast before 8am.  A light breakfast includes: Toast or bagel (no butter)  Black coffee (no cream/milk)  Tea (no cream/milk)  Fruit juices without pulp ( i.e. apple juice)  Do NOT eat meat, eggs, vegetables or fruit  If you have any questions, please contact your surgeon's office. Patient Information Regarding COVID Restrictions    Day of Procedure    Please park in the parking deck or any designated visitor parking lot. Enter the facility through the Main Entrance of the hospital.  On the day of surgery, please provide the cell phone number of the person who will be waiting for you to the Patient Access representative at the time of registration. Masks are highly recommended in the hospital, but not required. Once your procedure and the immediate recovery period is completed, a nurse in the recovery area will contact your designated visitor to inform them of your room number or to otherwise review other pertinent information regarding your care. Social distancing practices are strongly encouraged in waiting areas and the cafeteria.        The patient was contacted in person. She verbalized understanding of all instructions does not  need reinforcement.

## 2023-01-10 LAB
BACTERIA SPEC CULT: NORMAL
BACTERIA SPEC CULT: NORMAL
SERVICE CMNT-IMP: NORMAL

## 2023-01-10 NOTE — PERIOP NOTES
PAT Nurse Practitioner   Pre-Operative Chart Review/Assessment:-ORTHOPEDIC                Patient Name:  Chano Bauer                                                           Age:   69 y.o.    :  1945     Today's Date:  2023     Date of PAT:   23      Date of Surgery:    23      Procedure(s):  Left Unicondylar Knee Arthroplasty     Surgeon:   Dr. Eduard Malloy                       PLAN:      1)  Medical Clearance/PCP:  Julia Campbell MD      2)  Cardiac Clearance:  EKG and METs reviewed. No further cardiac evaluation requested. PAT EKG showed sinus alex; HR-54 and LAD. 3)  Diabetic Treatment Consult:  Not indicated. A1c-5.4      4)  Sleep Apnea evaluation:   Not indicated. CHRISTOPHER Score 1.       5) Treatment for MRSA/Staph Aureus:  Neg      6) Additional Concerns:  Former smoker, HTN, migraines    UCx added. No growth.                 Vital Signs:         Vitals:    23 1122   BP: (!) 180/79   Pulse: (!) 55   Temp: 97.4 °F (36.3 °C)   Weight: 49 kg (108 lb)   Height: 5' 5\" (1.651 m)          Body mass index is 17.97 kg/m².         ____________________________________________  PAST MEDICAL HISTORY  Past Medical History:   Diagnosis Date    Arthritis     Cancer ([address]     Skin    Colitis     Degenerative cervical spinal stenosis     Degenerative lumbar spinal stenosis     Hyperlipemia     Hypertension     Migraine     Occipital neuralgia of right side     Thyroid disease     Ulceration of vulva 2008    Wound of left leg 2017      ____________________________________________  PAST SURGICAL HISTORY  Past Surgical History:   Procedure Laterality Date    COLONOSCOPY N/A 10/4/2021    COLONOSCOPY WITH BIOPSY performed by Carrington Yao MD at hospitals ENDOSCOPY    COLONOSCOPY,DIAGNOSTIC  10/4/2021         HX ANKLE FRACTURE TX      HX APPENDECTOMY      HX COLONOSCOPY      HX GYN      hysterectomy at age 28    HX ORTHOPAEDIC Left 2017    rotator cuff repair ____________________________________________  HOME MEDICATIONS  Current Outpatient Medications   Medication Sig    gabapentin (NEURONTIN) 100 mg capsule Take  by mouth four (4) times daily. cholecalciferol, vitamin D3, 50 mcg (2,000 unit) tab Take  by mouth daily. cyanocobalamin 1,000 mcg tablet Take 3,000 mcg by mouth daily. multivitamin (ONE A DAY) tablet Take 1 Tablet by mouth daily. vit A/vit C/vit E/zinc/copper (PRESERVISION AREDS PO) Take  by mouth daily. butalbital-acetaminophen-caffeine (FIORICET, ESGIC) -40 mg per tablet TAKE 2 TABLETS BY MOUTH EVERY 8 HOURS AS NEEDED FOR HEADACHE    budesonide (ENTOCORT EC) 3 mg capsule TAKE 3 CAPSULES BY MOUTH DAILY    B.infantis-B.ani-B.long-B.bifi (Probiotic 4X) 10-15 mg TbEC Take  by mouth daily. thyroid, Pork, (ARMOUR) 90 mg tablet [place] Thyroid 90 mg tablet   Take 1 tablet every day by oral route. atorvastatin (LIPITOR) 10 mg tablet every evening. diclofenac EC (VOLTAREN) 75 mg EC tablet 75 mg two (2) times a day. conjugated estrogens (PREMARIN) 0.625 mg tablet 0.625 mg daily.  3 times weekly     No current facility-administered medications for this encounter.      ____________________________________________  ALLERGIES  Allergies   Allergen Reactions    Cephalexin Other (comments)     Other reaction(s): Dermatological problems, e.g., rash, hives    Penicillins Rash     Other reaction(s): Dermatological problems, e.g., rash, hives  Other reaction(s): Dermatological problems, e.g., rash, hives      Aspirin Rash     Unable to take large dosages  Unable to take large dosages      Codeine Other (comments)     Rash, unable to take large dosages       Penicillamine Rash    Penicillamine Rash    Prednisone Other (comments)     Insomnia, frequent and heart racing    Synthroid [Levothyroxine] Rash and Itching     To breast area    Levothyroxine Itching and Rash     To breast area      ____________________________________________  SOCIAL HISTORY  Social History     Tobacco Use    Smoking status: Former    Smokeless tobacco: Never   Substance Use Topics    Alcohol use: Not Currently     Alcohol/week: 0.0 standard drinks     Comment: very occasionally      ____________________________________________   Internal Administration   First Dose      Second Dose         Last COVID Lab SARS-CoV-2 ( )   Date Value   09/30/2021 Not detected                    Labs:     Hospital Outpatient Visit on 01/09/2023   Component Date Value Ref Range Status    Sodium 01/09/2023 133 (A)  136 - 145 mmol/L Final    Potassium 01/09/2023 4.1  3.5 - 5.1 mmol/L Final    Chloride 01/09/2023 97  97 - 108 mmol/L Final    CO2 01/09/2023 32  21 - 32 mmol/L Final    Anion gap 01/09/2023 4 (A)  5 - 15 mmol/L Final    Glucose 01/09/2023 129 (A)  65 - 100 mg/dL Final    BUN 01/09/2023 10  6 - 20 MG/DL Final    Creatinine 01/09/2023 0.71  0.55 - 1.02 MG/DL Final    BUN/Creatinine ratio 01/09/2023 14  12 - 20   Final    eGFR 01/09/2023 >60  >60 ml/min/1.73m2 Final    Comment:      Pediatric calculator link: [employer]Show.at. org/professionals/kdoqi/gfr_calculatorped       These results are not intended for use in patients <25years of age. eGFR results are calculated without a race factor using  the 2021 CKD-EPI equation. Careful clinical correlation is recommended, particularly when comparing to results calculated using previous equations. The CKD-EPI equation is less accurate in patients with extremes of muscle mass, extra-renal metabolism of creatinine, excessive creatine ingestion, or following therapy that affects renal tubular secretion.       Calcium 01/09/2023 9.1  8.5 - 10.1 MG/DL Final    WBC 01/09/2023 7.0  3.6 - 11.0 K/uL Final    RBC 01/09/2023 3.59 (A)  3.80 - 5.20 M/uL Final    HGB 01/09/2023 11.3 (A)  11.5 - 16.0 g/dL Final    HCT 01/09/2023 34.6 (A)  35.0 - 47.0 % Final    MCV 01/09/2023 96.4  80.0 - 99.0 FL Final    MCH 01/09/2023 31.5  26.0 - 34.0 PG Final MCHC 01/09/2023 32.7  30.0 - 36.5 g/dL Final    RDW 01/09/2023 12.8  11.5 - 14.5 % Final    PLATELET 43/30/9162 832  150 - 400 K/uL Final    MPV 01/09/2023 9.0  8.9 - 12.9 FL Final    NRBC 01/09/2023 0.0  0  WBC Final    ABSOLUTE NRBC 01/09/2023 0.00  0.00 - 0.01 K/uL Final    Crossmatch Expiration 01/09/2023 01/19/2023,2359   Final    ABO/Rh(D) 01/09/2023 O POSITIVE   Final    Antibody screen 01/09/2023 NEG   Final    INR 01/09/2023 1.0  0.9 - 1.1   Final    A single therapeutic range for Vit K antagonists may not be optimal for all indications - see June, 2008 issue of Chest, American College of Chest Physicians Evidence-Based Clinical Practice Guidelines, 8th Edition. Prothrombin time 01/09/2023 10.0  9.0 - 11.1 sec Final    Color 01/09/2023 YELLOW/STRAW    Final    Color Reference Range: Straw, Yellow or Dark Yellow    Appearance 01/09/2023 CLEAR  CLEAR   Final    Specific gravity 01/09/2023 1.007  1.003 - 1.030   Final    pH (UA) 01/09/2023 7.0  5.0 - 8.0   Final    Protein 01/09/2023 Negative  NEG mg/dL Final    Glucose 01/09/2023 Negative  NEG mg/dL Final    Ketone 01/09/2023 Negative  NEG mg/dL Final    Bilirubin 01/09/2023 Negative  NEG   Final    Blood 01/09/2023 Negative  NEG   Final    Urobilinogen 01/09/2023 0.2  0.2 - 1.0 EU/dL Final    Nitrites 01/09/2023 Negative  NEG   Final    Leukocyte Esterase 01/09/2023 MODERATE (A)  NEG   Final    WBC 01/09/2023 0-4  0 - 4 /hpf Final    RBC 01/09/2023 0-5  0 - 5 /hpf Final    Epithelial cells 01/09/2023 FEW  FEW /lpf Final    Epithelial cell category consists of squamous cells and /or transitional urothelial cells. Renal tubular cells, if present, are separately identified as such.     Bacteria 01/09/2023 1+ (A)  NEG /hpf Final    UA:UC IF INDICATED 01/09/2023 CULTURE NOT INDICATED BY UA RESULT  CNI   Final    Ventricular Rate 01/09/2023 54  BPM Final    Atrial Rate 01/09/2023 54  BPM Final    P-R Interval 01/09/2023 184  ms Final    QRS Duration 01/09/2023 88  ms Final    Q-T Interval 01/09/2023 422  ms Final    QTC Calculation (Bezet) 01/09/2023 400  ms Final    Calculated P Axis 01/09/2023 67  degrees Final    Calculated R Axis 01/09/2023 -38  degrees Final    Calculated T Axis 01/09/2023 47  degrees Final    Diagnosis 01/09/2023    Final                    Value:Sinus bradycardia  Left axis deviation    No previous ECGs available  Confirmed by Tilmon Severance ([id number]) on 1/9/2023 10:17:16 PM      Hemoglobin A1c 01/09/2023 5.4  4.0 - 5.6 % Final    Comment: NEW METHOD  PLEASE NOTE NEW REFERENCE RANGE  (NOTE)  HbA1C Interpretive Ranges  <5.7              Normal  5.7 - 6.4         Consider Prediabetes  >6.5              Consider Diabetes      Est. average glucose 01/09/2023 108  mg/dL Final    Special Requests: 01/09/2023 NO SPECIAL REQUESTS    Final    Culture result: 01/09/2023 MRSA NOT PRESENT    Final    Culture result: 01/09/2023     Final                    Value:Screening of patient nares for MRSA is for surveillance purposes and, if positive, to facilitate isolation considerations in high risk settings. It is not intended for automatic decolonization interventions per se as regimens are not sufficiently effective to warrant routine use. Special Requests: 01/09/2023 NO SPECIAL REQUESTS    Final    Culture result: 01/09/2023 No growth (<1,000 CFU/ML)    Final       Skin:     Denies open wounds, cuts, sores, rashes or other areas of concern in PAT assessment.           Dante Bauer NP  Available via [address] [place] [address]

## 2023-01-11 LAB
BACTERIA SPEC CULT: NORMAL
SERVICE CMNT-IMP: NORMAL

## 2023-01-12 ENCOUNTER — HOSPITAL ENCOUNTER (INPATIENT)
Age: 78
LOS: 1 days | Discharge: HOME | DRG: 103 | End: 2023-01-13
Attending: EMERGENCY MEDICINE | Admitting: STUDENT IN AN ORGANIZED HEALTH CARE EDUCATION/TRAINING PROGRAM
Payer: MEDICARE

## 2023-01-12 ENCOUNTER — TELEPHONE (OUTPATIENT)
Dept: NEUROLOGY | Age: 78
End: 2023-01-12

## 2023-01-12 DIAGNOSIS — G44.85 PRIMARY STABBING HEADACHE: ICD-10-CM

## 2023-01-12 DIAGNOSIS — G43.919 INTRACTABLE MIGRAINE WITHOUT STATUS MIGRAINOSUS, UNSPECIFIED MIGRAINE TYPE: Primary | ICD-10-CM

## 2023-01-12 DIAGNOSIS — M47.812 CERVICAL SPONDYLOSIS: ICD-10-CM

## 2023-01-12 PROBLEM — R51.9 HEADACHE: Status: ACTIVE | Noted: 2023-01-12

## 2023-01-12 LAB
ANION GAP SERPL CALC-SCNC: 10 MMOL/L (ref 5–15)
BASOPHILS # BLD: 0.1 K/UL (ref 0–0.1)
BASOPHILS NFR BLD: 1 % (ref 0–1)
BUN SERPL-MCNC: 13 MG/DL (ref 6–20)
BUN/CREAT SERPL: 25 (ref 12–20)
CALCIUM SERPL-MCNC: 9.3 MG/DL (ref 8.5–10.1)
CHLORIDE SERPL-SCNC: 96 MMOL/L (ref 97–108)
CO2 SERPL-SCNC: 24 MMOL/L (ref 21–32)
CREAT SERPL-MCNC: 0.53 MG/DL (ref 0.55–1.02)
DIFFERENTIAL METHOD BLD: ABNORMAL
EOSINOPHIL # BLD: 0.1 K/UL (ref 0–0.4)
EOSINOPHIL NFR BLD: 1 % (ref 0–7)
ERYTHROCYTE [DISTWIDTH] IN BLOOD BY AUTOMATED COUNT: 12.6 % (ref 11.5–14.5)
GLUCOSE SERPL-MCNC: 81 MG/DL (ref 65–100)
HCT VFR BLD AUTO: 34.8 % (ref 35–47)
HGB BLD-MCNC: 11.6 G/DL (ref 11.5–16)
IMM GRANULOCYTES # BLD AUTO: 0 K/UL (ref 0–0.04)
IMM GRANULOCYTES NFR BLD AUTO: 0 % (ref 0–0.5)
LYMPHOCYTES # BLD: 1.1 K/UL (ref 0.8–3.5)
LYMPHOCYTES NFR BLD: 13 % (ref 12–49)
MCH RBC QN AUTO: 31.2 PG (ref 26–34)
MCHC RBC AUTO-ENTMCNC: 33.3 G/DL (ref 30–36.5)
MCV RBC AUTO: 93.5 FL (ref 80–99)
MONOCYTES # BLD: 0.9 K/UL (ref 0–1)
MONOCYTES NFR BLD: 11 % (ref 5–13)
NEUTS SEG # BLD: 6.5 K/UL (ref 1.8–8)
NEUTS SEG NFR BLD: 74 % (ref 32–75)
NRBC # BLD: 0 K/UL (ref 0–0.01)
NRBC BLD-RTO: 0 PER 100 WBC
PLATELET # BLD AUTO: 226 K/UL (ref 150–400)
PMV BLD AUTO: 8.3 FL (ref 8.9–12.9)
POTASSIUM SERPL-SCNC: 3.8 MMOL/L (ref 3.5–5.1)
RBC # BLD AUTO: 3.72 M/UL (ref 3.8–5.2)
SODIUM SERPL-SCNC: 130 MMOL/L (ref 136–145)
WBC # BLD AUTO: 8.6 K/UL (ref 3.6–11)

## 2023-01-12 PROCEDURE — 99285 EMERGENCY DEPT VISIT HI MDM: CPT

## 2023-01-12 PROCEDURE — 80048 BASIC METABOLIC PNL TOTAL CA: CPT

## 2023-01-12 PROCEDURE — 74011250636 HC RX REV CODE- 250/636

## 2023-01-12 PROCEDURE — 65270000046 HC RM TELEMETRY

## 2023-01-12 PROCEDURE — 96365 THER/PROPH/DIAG IV INF INIT: CPT

## 2023-01-12 PROCEDURE — 96366 THER/PROPH/DIAG IV INF ADDON: CPT

## 2023-01-12 PROCEDURE — 85025 COMPLETE CBC W/AUTO DIFF WBC: CPT

## 2023-01-12 PROCEDURE — 94762 N-INVAS EAR/PLS OXIMTRY CONT: CPT

## 2023-01-12 PROCEDURE — 36415 COLL VENOUS BLD VENIPUNCTURE: CPT

## 2023-01-12 PROCEDURE — 74011250636 HC RX REV CODE- 250/636: Performed by: EMERGENCY MEDICINE

## 2023-01-12 PROCEDURE — 96375 TX/PRO/DX INJ NEW DRUG ADDON: CPT

## 2023-01-12 RX ORDER — METOCLOPRAMIDE HYDROCHLORIDE 5 MG/ML
10 INJECTION INTRAMUSCULAR; INTRAVENOUS
Status: COMPLETED | OUTPATIENT
Start: 2023-01-12 | End: 2023-01-12

## 2023-01-12 RX ORDER — PROCHLORPERAZINE EDISYLATE 5 MG/ML
10 INJECTION INTRAMUSCULAR; INTRAVENOUS
Status: DISCONTINUED | OUTPATIENT
Start: 2023-01-12 | End: 2023-01-13 | Stop reason: HOSPADM

## 2023-01-12 RX ORDER — SODIUM CHLORIDE 0.9 % (FLUSH) 0.9 %
5-40 SYRINGE (ML) INJECTION EVERY 8 HOURS
Status: DISCONTINUED | OUTPATIENT
Start: 2023-01-12 | End: 2023-01-13 | Stop reason: HOSPADM

## 2023-01-12 RX ORDER — HALOPERIDOL 5 MG/ML
5 INJECTION INTRAMUSCULAR ONCE
Status: COMPLETED | OUTPATIENT
Start: 2023-01-12 | End: 2023-01-12

## 2023-01-12 RX ORDER — PROCHLORPERAZINE EDISYLATE 5 MG/ML
10 INJECTION INTRAMUSCULAR; INTRAVENOUS ONCE
Status: COMPLETED | OUTPATIENT
Start: 2023-01-12 | End: 2023-01-12

## 2023-01-12 RX ORDER — MAGNESIUM SULFATE HEPTAHYDRATE 40 MG/ML
2 INJECTION, SOLUTION INTRAVENOUS ONCE
Status: COMPLETED | OUTPATIENT
Start: 2023-01-12 | End: 2023-01-12

## 2023-01-12 RX ORDER — ACETAMINOPHEN 650 MG/1
650 SUPPOSITORY RECTAL
Status: DISCONTINUED | OUTPATIENT
Start: 2023-01-12 | End: 2023-01-13 | Stop reason: HOSPADM

## 2023-01-12 RX ORDER — KETOROLAC TROMETHAMINE 30 MG/ML
15 INJECTION, SOLUTION INTRAMUSCULAR; INTRAVENOUS
Status: DISCONTINUED | OUTPATIENT
Start: 2023-01-12 | End: 2023-01-13 | Stop reason: HOSPADM

## 2023-01-12 RX ORDER — DIPHENHYDRAMINE HYDROCHLORIDE 50 MG/ML
25 INJECTION, SOLUTION INTRAMUSCULAR; INTRAVENOUS
Status: DISCONTINUED | OUTPATIENT
Start: 2023-01-12 | End: 2023-01-13 | Stop reason: HOSPADM

## 2023-01-12 RX ORDER — SODIUM CHLORIDE 9 MG/ML
75 INJECTION, SOLUTION INTRAVENOUS CONTINUOUS
Status: DISPENSED | OUTPATIENT
Start: 2023-01-12 | End: 2023-01-13

## 2023-01-12 RX ORDER — KETOROLAC TROMETHAMINE 30 MG/ML
30 INJECTION, SOLUTION INTRAMUSCULAR; INTRAVENOUS ONCE
Status: COMPLETED | OUTPATIENT
Start: 2023-01-12 | End: 2023-01-12

## 2023-01-12 RX ORDER — SODIUM CHLORIDE 0.9 % (FLUSH) 0.9 %
5-40 SYRINGE (ML) INJECTION AS NEEDED
Status: DISCONTINUED | OUTPATIENT
Start: 2023-01-12 | End: 2023-01-13 | Stop reason: HOSPADM

## 2023-01-12 RX ORDER — POLYETHYLENE GLYCOL 3350 17 G/17G
17 POWDER, FOR SOLUTION ORAL DAILY PRN
Status: DISCONTINUED | OUTPATIENT
Start: 2023-01-12 | End: 2023-01-13 | Stop reason: HOSPADM

## 2023-01-12 RX ORDER — ACETAMINOPHEN 325 MG/1
650 TABLET ORAL
Status: DISCONTINUED | OUTPATIENT
Start: 2023-01-12 | End: 2023-01-13 | Stop reason: HOSPADM

## 2023-01-12 RX ADMIN — METOCLOPRAMIDE 10 MG: 5 INJECTION, SOLUTION INTRAMUSCULAR; INTRAVENOUS at 16:52

## 2023-01-12 RX ADMIN — SODIUM CHLORIDE 1000 ML: 9 INJECTION, SOLUTION INTRAVENOUS at 13:13

## 2023-01-12 RX ADMIN — PROCHLORPERAZINE EDISYLATE 10 MG: 5 INJECTION INTRAMUSCULAR; INTRAVENOUS at 13:13

## 2023-01-12 RX ADMIN — MAGNESIUM SULFATE HEPTAHYDRATE 2 G: 40 INJECTION, SOLUTION INTRAVENOUS at 16:53

## 2023-01-12 RX ADMIN — HALOPERIDOL LACTATE 5 MG: 5 INJECTION, SOLUTION INTRAMUSCULAR at 15:33

## 2023-01-12 RX ADMIN — KETOROLAC TROMETHAMINE 30 MG: 30 INJECTION, SOLUTION INTRAMUSCULAR at 13:13

## 2023-01-12 NOTE — TELEPHONE ENCOUNTER
Voicemail:    Patient's daughter, Lois Tarango, called stating that they spoke with Dr Jenaro Benson and he recommended that her and Pt speak with Dr Corey August or his Nurse.  Please call [phone]

## 2023-01-12 NOTE — ED PROVIDER NOTES
EMERGENCY DEPARTMENT HISTORY AND PHYSICAL EXAM      Date: 1/12/2023  Patient Name: Elmira Reeves    History of Presenting Illness     Chief Complaint   Patient presents with    Headache     Pt with hx of occipital migraines received occipital injections on Tuesday, which actually worsened her sx instead of relieving them. Neurologist is Dr Reyna Tavera. Pt is here for uncontrolled pain. History Provided By: Patient and Patient's Daughter    HPI: Elmira Reeves, 69 y.o. female with a past medical history significant for occipital migraines presents with intractable headache consistent with previous migraine pain. Patient reports that earlier this week she experienced acute worsening of her typical occipital migraine pain. She went in for an occipital nerve block on Tuesday which she receives frequently in the setting of her occipital migraines but developed worsening of her headache pain in the subsequent days. She reached out to her neurologist to let them know that she was having significant pain but was told that she should follow-up with her chronic pain specialist or present to the emergency department for more acute management. She has been attempting butalbital and gabapentin at home as well as steroids in the past week without any improvement of her symptoms. She has not been able to tolerate any p.o. and is feeling increasingly fatigued and Resporal in the setting of her intractable migraine pain. She denies any new neurological deficits including difficulty ambulating, dysarthria, difficulty speaking, weakness, or dysesthesias associated with this worsening pain. There are no associated symptoms. No other exacerbating or ameliorating factors. PCP: Laureano Piña MD    No current facility-administered medications on file prior to encounter.      Current Outpatient Medications on File Prior to Encounter   Medication Sig Dispense Refill    gabapentin (NEURONTIN) 100 mg capsule Take  by mouth four (4) times daily. cholecalciferol, vitamin D3, 50 mcg (2,000 unit) tab Take  by mouth daily. cyanocobalamin 1,000 mcg tablet Take 3,000 mcg by mouth daily. multivitamin (ONE A DAY) tablet Take 1 Tablet by mouth daily. vit A/vit C/vit E/zinc/copper (PRESERVISION AREDS PO) Take  by mouth daily. butalbital-acetaminophen-caffeine (FIORICET, ESGIC) -40 mg per tablet TAKE 2 TABLETS BY MOUTH EVERY 8 HOURS AS NEEDED FOR HEADACHE 50 Tablet 5    budesonide (ENTOCORT EC) 3 mg capsule TAKE 3 CAPSULES BY MOUTH DAILY      B.infantis-B.ani-B.long-B.bifi (Probiotic 4X) 10-15 mg TbEC Take  by mouth daily. thyroid, Pork, (ARMOUR) 90 mg tablet [place] Thyroid 90 mg tablet   Take 1 tablet every day by oral route. atorvastatin (LIPITOR) 10 mg tablet every evening. diclofenac EC (VOLTAREN) 75 mg EC tablet 75 mg two (2) times a day. conjugated estrogens (PREMARIN) 0.625 mg tablet 0.625 mg daily.  3 times weekly         Past History     Past Medical History:  Past Medical History:   Diagnosis Date    Arthritis     Cancer ([facility] [place] [address]     Skin    Colitis     Degenerative cervical spinal stenosis     Degenerative lumbar spinal stenosis     Hyperlipemia     Hypertension     Migraine     Occipital neuralgia of right side     Thyroid disease     Ulceration of vulva 01/17/2008    Wound of left leg 12/13/2017       Past Surgical History:  Past Surgical History:   Procedure Laterality Date    COLONOSCOPY N/A 10/4/2021    COLONOSCOPY WITH BIOPSY performed by Tripp Gil MD at [facility] ENDOSCOPY    COLONOSCOPY,DIAGNOSTIC  10/4/2021         HX ANKLE FRACTURE TX      HX APPENDECTOMY      HX COLONOSCOPY      HX GYN      hysterectomy at age 28    HX ORTHOPAEDIC Left 2017    rotator cuff repair       Family History:  Family History   Problem Relation Age of Onset    Dementia Mother     Breast Cancer Mother     Other Mother         POLIO AND COLITIS    Cancer Mother         Breast cancer, alzheimers COPD Father     Migraines Child     Allergic Rhinitis Child     Sleep Apnea Child     Anesth Problems Neg Hx        Social History:  Social History     Tobacco Use    Smoking status: Former    Smokeless tobacco: Never   Vaping Use    Vaping Use: Never used   Substance Use Topics    Alcohol use: Not Currently     Alcohol/week: 0.0 standard drinks     Comment: very occasionally    Drug use: Never       Allergies: Allergies   Allergen Reactions    Cephalexin Other (comments)     Other reaction(s): Dermatological problems, e.g., rash, hives    Penicillins Rash     Other reaction(s): Dermatological problems, e.g., rash, hives  Other reaction(s): Dermatological problems, e.g., rash, hives      Aspirin Rash     Unable to take large dosages  Unable to take large dosages      Codeine Other (comments)     Rash, unable to take large dosages       Penicillamine Rash    Penicillamine Rash    Prednisone Other (comments)     Insomnia, frequent and heart racing    Synthroid [Levothyroxine] Rash and Itching     To breast area    Levothyroxine Itching and Rash     To breast area         Review of Systems   Review of Systems   Constitutional:  Negative for fever. HENT:  Negative for trouble swallowing. Eyes:  Negative for visual disturbance. Respiratory:  Negative for shortness of breath. Cardiovascular:  Negative for chest pain. Gastrointestinal:  Negative for nausea and vomiting. Genitourinary:  Negative for decreased urine volume. Musculoskeletal:  Positive for neck pain. Skin:  Negative for rash. Neurological:  Positive for headaches. Negative for syncope, facial asymmetry, speech difficulty, weakness and numbness. Physical Exam   Physical Exam  Constitutional:       Appearance: She is not toxic-appearing or diaphoretic. HENT:      Head: Normocephalic and atraumatic.       Right Ear: External ear normal.      Left Ear: External ear normal.      Nose: Nose normal.   Eyes:      General: No scleral icterus. Extraocular Movements: Extraocular movements intact. Conjunctiva/sclera: Conjunctivae normal.   Cardiovascular:      Rate and Rhythm: Normal rate and regular rhythm. Pulmonary:      Effort: Pulmonary effort is normal. No respiratory distress. Abdominal:      General: Abdomen is flat. There is no distension. Musculoskeletal:         General: Normal range of motion. Cervical back: Normal range of motion. Skin:     General: Skin is warm and dry. Neurological:      General: No focal deficit present. Mental Status: She is alert. Cranial Nerves: No cranial nerve deficit. Sensory: No sensory deficit. Motor: No weakness. Coordination: Coordination normal.       Diagnostic Study Results     Labs -   No results found for this or any previous visit (from the past 24 hour(s)). Radiologic Studies -   No orders to display     CT Results  (Last 48 hours)      None          CXR Results  (Last 48 hours)      None              Medical Decision Making   I am the first provider for this patient. I reviewed the vital signs, available nursing notes, past medical history, past surgical history, family history and social history. Vital Signs-Reviewed the patient's vital signs. Patient Vitals for the past 12 hrs:   Temp Pulse Resp BP SpO2   01/12/23 1543 -- 85 18 127/71 98 %   01/12/23 1220 98.1 °F (36.7 °C) 86 16 (!) 167/87 95 %       Records Reviewed: Nursing records and medical records reviewed    MDM:  77yoF with h/o occipital migraines presents with intractable headache consistent with previous migraine pain. On exam, patient is nontoxic in appearance and has unremarkable vitals. She has no focal neurological deficits and inspection of the occiput at the site of her nerve block injections appears clean, dry, without erythema or surrounding cellulitis or fluctuance. Review EKG from 1/9 reveals normal QTC of 400.  Presentation consistent with patient's chronic occipital migraine pain is refractory to outpatient management at this time. We will proceed with migraine cocktail and reassess and provide additional medications as needed to improve her symptoms. ED Course:   Initial assessment performed. The patients presenting problems have been discussed, and they are in agreement with the care plan formulated and outlined with them. I have encouraged them to ask questions as they arise throughout their visit. ED Course as of 01/12/23 1633   Thu Jan 12, 2023   1444 Pain improved from 10/10 to 9/10. Will provide dose of haldol and reassess. [RN]   8324 Reports pain is now down to a 6 out of 10 after the Haldol. [RN]   1632 Will try magnesium and depakote. Pt reports she is willing to stay for admission if pain is not better controlled with this round of meds. Will also likely discuss case with neurology if no further improvement and admission. [RN]      ED Course User Index  [RN] Apollo Merrill MD       Medications Administered       ketorolac (TORADOL) injection 30 mg       Admin Date  01/12/2023 Action  Given Dose  30 mg Route  IntraVENous Administered By  Soledad Nicole RN              prochlorperazine (COMPAZINE) injection 10 mg       Admin Date  01/12/2023 Action  Given Dose  10 mg Route  IntraVENous Administered By  Soledad Nicole RN              sodium chloride 0.9 % bolus infusion 1,000 mL       Admin Date  01/12/2023 Action  New Bag Dose  1,000 mL Route  IntraVENous Administered By  Soledad Nicole RN                    Disposition:  Admit    DISCHARGE PLAN:  1. Current Discharge Medication List        2. Follow-up Information       Follow up With Specialties Details Why Contact Info    [facility] EMERGENCY DEPT Emergency Medicine  If symptoms worsen [address] [facility] [address]    Jayant Washburn MD Family Medicine  As needed [facility]  [address] [facility] [phone]            3. Return to ED if worse     Diagnosis     Clinical Impression:   1. Intractable migraine without status migrainosus, unspecified migraine type        Attestations:    Alona Barker MD    Please note that this dictation was completed with [email or web address], the computer voice recognition software. Quite often unanticipated grammatical, syntax, homophones, and other interpretive errors are inadvertently transcribed by the computer software. Please disregard these errors. Please excuse any errors that have escaped final proofreading. Thank you.

## 2023-01-12 NOTE — DISCHARGE INSTRUCTIONS
HOSPITALIST DISCHARGE INSTRUCTIONS    NAME: Manjinder Atkins   :  1945   MRN:  [id number]     Date/Time:  2023 11:02 AM    ADMIT DATE: 2023     DISCHARGE DATE: 2023     DISCHARGE DIAGNOSIS:  Migraine headache      Follow-up Information       Follow up With Specialties Details Why Contact Info    [facility] EMERGENCY DEPT Emergency Medicine  If symptoms worsen [address] Mechelle Macias MD Family Medicine  As needed [facility] [address] [phone]               MEDICATIONS:  As per medication reconciliation  list  It is important that you take the medication exactly as they are prescribed. Keep your medication in the bottles provided by the pharmacist and keep a list of the medication names, dosages, and times to be taken in your wallet. Do not take other medications without consulting your doctor. Pain Management: per above medications    What to do at Home    Recommended diet:  Cardiac Diet    Recommended activity: Activity as tolerated    If you have questions regarding the hospital related prescriptions or hospital related issues please call at [phone]. If you experience any of the following symptoms then please call your primary care physician or return to the emergency room if you cannot get hold of your doctor:  Fever, chills, nausea, vomiting, diarrhea, change in mentation, falling, bleeding, shortness of breath    Follow Up:  Dr. Faheem Macias MD  you are to call and set up an appointment to see them in 7-10 days. Information obtained by :  I understand that if any problems occur once I am at home I am to contact my physician. I understand and acknowledge receipt of the instructions indicated above.                                                                                                                                            Physician's or R.N.'s Signature                                                                  Date/Time                                                                                                                                              Patient or Representative Signature                                                          Date/Time

## 2023-01-13 ENCOUNTER — APPOINTMENT (OUTPATIENT)
Dept: CT IMAGING | Age: 78
DRG: 103 | End: 2023-01-13
Attending: STUDENT IN AN ORGANIZED HEALTH CARE EDUCATION/TRAINING PROGRAM
Payer: MEDICARE

## 2023-01-13 VITALS
DIASTOLIC BLOOD PRESSURE: 56 MMHG | TEMPERATURE: 98.8 F | WEIGHT: 102.73 LBS | SYSTOLIC BLOOD PRESSURE: 121 MMHG | RESPIRATION RATE: 13 BRPM | HEART RATE: 76 BPM | OXYGEN SATURATION: 98 % | HEIGHT: 65 IN | BODY MASS INDEX: 17.12 KG/M2

## 2023-01-13 LAB
ALBUMIN SERPL-MCNC: 2.8 G/DL (ref 3.5–5)
ALBUMIN/GLOB SERPL: 0.7 (ref 1.1–2.2)
ALP SERPL-CCNC: 138 U/L (ref 45–117)
ALT SERPL-CCNC: 19 U/L (ref 12–78)
ANION GAP SERPL CALC-SCNC: 10 MMOL/L (ref 5–15)
AST SERPL-CCNC: 23 U/L (ref 15–37)
BILIRUB SERPL-MCNC: 0.9 MG/DL (ref 0.2–1)
BUN SERPL-MCNC: 13 MG/DL (ref 6–20)
BUN/CREAT SERPL: 23 (ref 12–20)
CALCIUM SERPL-MCNC: 9.2 MG/DL (ref 8.5–10.1)
CHLORIDE SERPL-SCNC: 96 MMOL/L (ref 97–108)
CO2 SERPL-SCNC: 23 MMOL/L (ref 21–32)
CREAT SERPL-MCNC: 0.56 MG/DL (ref 0.55–1.02)
ERYTHROCYTE [DISTWIDTH] IN BLOOD BY AUTOMATED COUNT: 12.6 % (ref 11.5–14.5)
GLOBULIN SER CALC-MCNC: 4 G/DL (ref 2–4)
GLUCOSE SERPL-MCNC: 80 MG/DL (ref 65–100)
HCT VFR BLD AUTO: 33.6 % (ref 35–47)
HGB BLD-MCNC: 11.1 G/DL (ref 11.5–16)
MCH RBC QN AUTO: 31.2 PG (ref 26–34)
MCHC RBC AUTO-ENTMCNC: 33 G/DL (ref 30–36.5)
MCV RBC AUTO: 94.4 FL (ref 80–99)
NRBC # BLD: 0 K/UL (ref 0–0.01)
NRBC BLD-RTO: 0 PER 100 WBC
PLATELET # BLD AUTO: 241 K/UL (ref 150–400)
PMV BLD AUTO: 8.5 FL (ref 8.9–12.9)
POTASSIUM SERPL-SCNC: 3.8 MMOL/L (ref 3.5–5.1)
PROT SERPL-MCNC: 6.8 G/DL (ref 6.4–8.2)
RBC # BLD AUTO: 3.56 M/UL (ref 3.8–5.2)
SODIUM SERPL-SCNC: 129 MMOL/L (ref 136–145)
WBC # BLD AUTO: 8.1 K/UL (ref 3.6–11)

## 2023-01-13 PROCEDURE — 36415 COLL VENOUS BLD VENIPUNCTURE: CPT

## 2023-01-13 PROCEDURE — 99223 1ST HOSP IP/OBS HIGH 75: CPT | Performed by: PSYCHIATRY & NEUROLOGY

## 2023-01-13 PROCEDURE — 74011000250 HC RX REV CODE- 250: Performed by: STUDENT IN AN ORGANIZED HEALTH CARE EDUCATION/TRAINING PROGRAM

## 2023-01-13 PROCEDURE — 74011250636 HC RX REV CODE- 250/636: Performed by: STUDENT IN AN ORGANIZED HEALTH CARE EDUCATION/TRAINING PROGRAM

## 2023-01-13 PROCEDURE — C9113 INJ PANTOPRAZOLE SODIUM, VIA: HCPCS | Performed by: STUDENT IN AN ORGANIZED HEALTH CARE EDUCATION/TRAINING PROGRAM

## 2023-01-13 PROCEDURE — 85027 COMPLETE CBC AUTOMATED: CPT

## 2023-01-13 PROCEDURE — 80053 COMPREHEN METABOLIC PANEL: CPT

## 2023-01-13 PROCEDURE — 70450 CT HEAD/BRAIN W/O DYE: CPT

## 2023-01-13 RX ORDER — AMITRIPTYLINE HYDROCHLORIDE 25 MG/1
25 TABLET, FILM COATED ORAL
Qty: 30 TABLET | Refills: 1 | Status: SHIPPED | OUTPATIENT
Start: 2023-01-13

## 2023-01-13 RX ORDER — VIT A/VIT C/VIT E/ZINC/COPPER 2148-113
1 TABLET ORAL DAILY
COMMUNITY

## 2023-01-13 RX ORDER — BACLOFEN 5 MG/1
5 TABLET ORAL EVERY 12 HOURS
Qty: 14 TABLET | Refills: 0 | Status: SHIPPED | OUTPATIENT
Start: 2023-01-13 | End: 2023-01-19 | Stop reason: SDUPTHER

## 2023-01-13 RX ADMIN — PROCHLORPERAZINE EDISYLATE 10 MG: 5 INJECTION INTRAMUSCULAR; INTRAVENOUS at 01:40

## 2023-01-13 RX ADMIN — SODIUM CHLORIDE, PRESERVATIVE FREE 10 ML: 5 INJECTION INTRAVENOUS at 03:54

## 2023-01-13 RX ADMIN — PROCHLORPERAZINE EDISYLATE 10 MG: 5 INJECTION INTRAMUSCULAR; INTRAVENOUS at 09:47

## 2023-01-13 RX ADMIN — DIPHENHYDRAMINE HYDROCHLORIDE 25 MG: 50 INJECTION, SOLUTION INTRAMUSCULAR; INTRAVENOUS at 09:47

## 2023-01-13 RX ADMIN — SODIUM CHLORIDE, PRESERVATIVE FREE 10 ML: 5 INJECTION INTRAVENOUS at 05:09

## 2023-01-13 RX ADMIN — DIPHENHYDRAMINE HYDROCHLORIDE 25 MG: 50 INJECTION, SOLUTION INTRAMUSCULAR; INTRAVENOUS at 01:39

## 2023-01-13 RX ADMIN — SODIUM CHLORIDE 40 MG: 9 INJECTION, SOLUTION INTRAMUSCULAR; INTRAVENOUS; SUBCUTANEOUS at 09:48

## 2023-01-13 RX ADMIN — KETOROLAC TROMETHAMINE 15 MG: 30 INJECTION, SOLUTION INTRAMUSCULAR at 01:41

## 2023-01-13 RX ADMIN — SODIUM CHLORIDE 75 ML/HR: 9 INJECTION, SOLUTION INTRAVENOUS at 00:13

## 2023-01-13 RX ADMIN — KETOROLAC TROMETHAMINE 15 MG: 30 INJECTION, SOLUTION INTRAMUSCULAR at 09:47

## 2023-01-13 NOTE — PROGRESS NOTES
Physical Therapy    Received PT eval order. Pt here with HA/migraine. She reports that is her only issue and has had no issues with mobility or amb. Up ad kushal in the ED and to the bathroom. She feels she is at her baseline mobility. No hx of falls. Pt without concern for d/c from therapy standpoint. Will sign off.     Ellie Spring, PT

## 2023-01-13 NOTE — DISCHARGE SUMMARY
Hospitalist Discharge Summary     Patient ID:  John Jo  [id number]  43 y.o.  1945 1/12/2023    PCP on record: Naif Jamison MD    Admit date: 1/12/2023  Discharge date and time: 1/13/2023    DISCHARGE DIAGNOSIS:  Migraine headache    CONSULTATIONS:  IP CONSULT TO NEUROLOGY  IP CONSULT TO HOSPITALIST    Excerpted HPI from H&P of Tapan Ramos, DO:  69 y.o. female with pertinent medical hx of migraines presented with severe migraine that is not resolved despite taking her medication. ______________________________________________________________________  DISCHARGE SUMMARY/HOSPITAL COURSE:  for full details see H&P, daily progress notes, labs, consult notes. Admitted overnight with intractable headache,with history for migraine headache. CT head negative. Was seen by neurology has cleared for discharge on baclofen and amitryptiline. Will follow up with her neurologist outpatient. She is eager to go home.      _______________________________________________________________________  Patient seen and examined by me on discharge day. Pertinent Findings:  Gen:    Not in distress  Chest: Clear lungs  CVS:   Regular rhythm. No edema  Abd:  Soft, not distended, not tender  Neuro:  Alert,   _______________________________________________________________________  DISCHARGE MEDICATIONS:   Current Discharge Medication List        START taking these medications    Details   baclofen 5 mg tab Take 5 mg by mouth every twelve (12) hours. Qty: 14 Tablet, Refills: 0  Start date: 1/13/2023      amitriptyline (ELAVIL) 25 mg tablet Take 1 Tablet by mouth nightly. Qty: 30 Tablet, Refills: 1  Start date: 1/13/2023           CONTINUE these medications which have NOT CHANGED    Details   vitamins A,C,E-zinc-copper (Ocuvite PreserVision) 2,148 mcg-113 mg-45 mg-17.4mg tab tablet Take 1 Tablet by mouth daily.       gabapentin (NEURONTIN) 100 mg capsule Take 100-200 mg by mouth four (4) times daily as needed (headache/migraine). 1 to 2 tabs four times daily prn HA/migraine      cholecalciferol, vitamin D3, 50 mcg (2,000 unit) tab Take 2,000 Units by mouth daily. cyanocobalamin 1,000 mcg tablet Take 3,000 mcg by mouth daily. multivitamin (ONE A DAY) tablet Take 1 Tablet by mouth daily. butalbital-acetaminophen-caffeine (FIORICET, ESGIC) -40 mg per tablet TAKE 2 TABLETS BY MOUTH EVERY 8 HOURS AS NEEDED FOR HEADACHE  Qty: 50 Tablet, Refills: 5    Associated Diagnoses: Cervicogenic headache; Migraine with aura, not intractable, without status migrainosus; Degenerative lumbar spinal stenosis; Cervical radiculopathy due to degenerative joint disease of spine      budesonide (ENTOCORT EC) 3 mg capsule TAKE 3 CAPSULES BY MOUTH DAILY      B.infantis-B.ani-B.long-B.bifi (Probiotic 4X) 10-15 mg TbEC Take  by mouth daily. thyroid, Pork, (ARMOUR) 90 mg tablet Take 90 mg by mouth daily. atorvastatin (LIPITOR) 10 mg tablet Take 10 mg by mouth every evening. diclofenac EC (VOLTAREN) 75 mg EC tablet 75 mg two (2) times a day. conjugated estrogens (PREMARIN) 0.625 mg tablet Take 0.625 mg by mouth three (3) days a week. 3 times weekly, no specific days               Patient Follow Up Instructions:    Activity: Activity as tolerated  Diet: Cardiac Diet  Wound Care: None needed    Follow-up with     Follow-up Information       Follow up With Specialties Details Why Contact Info    [facility] EMERGENCY DEPT Emergency Medicine  If symptoms worsen [address] [facility] [address]    Terry Jung MD Family Medicine  As needed [address] [phone]            ________________________________________________________________    Risk of deterioration: Low    Condition at Discharge:  Stable  __________________________________________________________________    Disposition  Home with family, no needs    ____________________________________________________________________    Code Status: Full Code  ___________________________________________________________________      Total time in minutes spent coordinating this discharge (includes going over instructions, follow-up, prescriptions, and preparing report for sign off to her PCP) :  35 minutes    Signed:  Deborah Soto MD

## 2023-01-13 NOTE — PROGRESS NOTES
Pt continues to have a HA but has no other neuro symptoms. Pt is up ad kushal in the room and to bathroom performing ADLs on her own. Will sign off and pt is in agreement.

## 2023-01-13 NOTE — ED NOTES
2330- Assumed care of patient at this time from Varun, [address] - Patient alert and oriented, ambulated to restroom with no issues, no needs at this time. Shabnam - MD in room with patient at this time. Diego - MD informed this nurse patient c/o headache remains at this time, MD instructed this nurse to administer patient's PRN medications. 0140 - Reviewed patient's PRN medications with patient, patient would like to receive them at this time. 0218 - Responded to patient's I.v. pump beeping, repositioned patient's arm and I.v. tubing, no other complaints at this time. 36 - Verbal shift change report given to Margi Mueller RN (oncoming nurse) by Sofie Hatchet, RN (offgoing nurse). Report included the following information SBAR and ED Summary.

## 2023-01-13 NOTE — ED NOTES
3999 - Bedside and Verbal shift change report given to [address] [place] [address] (oncoming nurse) by Nini Barrios (offgoing nurse). Report included the following information SBAR, Kardex, ED Summary, Intake/Output, and MAR. Pt walked back from restroom - pt with no acute distress noted at this time - speaking in full sentences at this time - pt remains with HA, (R) sided, no neuro deficits noted at this time;; stroke NIH score of 0 at this time;;     0620 - pt with strong and steady gait to and from bathroom - pt remains with (R) sided HA yet with no neuro deficits noted at this time - A/Ox4 - clear speech, no asphasia;;     6123 - Verbal shift change report given to [facility]  (oncoming nurse) by Belkis Willett RN (offgoing nurse). Report included the following information SBAR, Kardex, ED Summary, Intake/Output, and MAR.

## 2023-01-13 NOTE — PROGRESS NOTES
Pharmacy Medication Reconciliation     The patient was interviewed regarding current PTA medication list, use and drug allergies;  patient present in room and obtained permission from patient to discuss drug regimen with visitor(s) present. The patient was questioned regarding use of any other inhalers, topical products, over the counter medications, herbal medications, vitamin products or ophthalmic/nasal/otic medication use. Allergy Update: Cephalexin, Penicillins, Aspirin, Codeine, Penicillamine, Penicillamine, Prednisone, Synthroid [levothyroxine], and Levothyroxine    Recommendations/Findings: The following amendments were made to the patient's active medication list on file at [facility]:   1) Additions: none  2) Deletions: none  3) Changes:   -filled in missing sig/gaps  Pertinent Findings:   -patient has Rx for norco 5-325mg, 0.5 to 1 tab qid x 7 days for migraine--however, patient has not started this medication. Clarified PTA med list with rx query, patient interview. PTA medication list was corrected to the following:     Prior to Admission Medications   Prescriptions Last Dose Informant Taking? B.infantis-B.ani-B.long-B.bifi (Probiotic 4X) 10-15 mg TbEC  Self Yes   Sig: Take  by mouth daily. atorvastatin (LIPITOR) 10 mg tablet  Self Yes   Sig: Take 10 mg by mouth every evening. budesonide (ENTOCORT EC) 3 mg capsule  Self Yes   Sig: TAKE 3 CAPSULES BY MOUTH DAILY   butalbital-acetaminophen-caffeine (FIORICET, ESGIC) -40 mg per tablet  Self Yes   Sig: TAKE 2 TABLETS BY MOUTH EVERY 8 HOURS AS NEEDED FOR HEADACHE   cholecalciferol, vitamin D3, 50 mcg (2,000 unit) tab  Self Yes   Sig: Take 2,000 Units by mouth daily. conjugated estrogens (PREMARIN) 0.625 mg tablet  Self Yes   Sig: Take 0.625 mg by mouth three (3) days a week. 3 times weekly, no specific days   cyanocobalamin 1,000 mcg tablet  Self Yes   Sig: Take 3,000 mcg by mouth daily.    diclofenac EC (VOLTAREN) 75 mg EC tablet  Self Yes Si mg two (2) times a day.   gabapentin (NEURONTIN) 100 mg capsule  Self Yes   Sig: Take 100-200 mg by mouth four (4) times daily as needed (headache/migraine). 1 to 2 tabs four times daily prn HA/migraine   multivitamin (ONE A DAY) tablet  Self Yes   Sig: Take 1 Tablet by mouth daily. thyroid, Pork, (ARMOUR) 90 mg tablet  Self Yes   Sig: Take 90 mg by mouth daily. vitamins A,C,E-zinc-copper (Ocuvite PreserVision) 2,148 mcg-113 mg-45 mg-17.4mg tab tablet  Self Yes   Sig: Take 1 Tablet by mouth daily.       Facility-Administered Medications: None        Thank you,  Maddy Pritchett [place]

## 2023-01-13 NOTE — TELEPHONE ENCOUNTER
Spoke with patient's daughter, Bernabe Loo. Verified patient with two patient identifiers. State pt went to [facility] ER, saw Dr. Sabra Brush for HEBERT. States they need HF appt and Dr. Sabra Brush was going to see if we could get her in to see Dr. Kath Sanchez fairly soon.     Will add to appt list.

## 2023-01-13 NOTE — PROGRESS NOTES
Pt discharging to home. Daughter at bedside and will be providing transportation. Discharge paperwork reviewed and Pt leaving with a copy. Pt escorted via wheelchair to daughter's vehicle at 1200.

## 2023-01-13 NOTE — CONSULTS
Neurology Note    Patient ID:  Femi Marie  [id number]  73 y.o.  1945      Date of Consultation:  January 13, 2023    Referring Physician: Dr. Geovanna Brush    Reason for Consultation:  migraines      Assessment and Plan:    The patient is a 32-year-old female with longstanding history of both occipital neuralgia and migraine headaches who presents with severe intractable neck pain and status migrainosus. Her neurological examination does reveal tight cervical paraspinal muscles but no focal sensory or motor deficit. Severe occipital neuralgia and status migrainosus:    She has a longstanding history of these headaches but dramatic worsening recently. Due to the amount of medication she has needed to take recently,  there is also a concern about a rebound portion to her headaches  IV cocktail of medications has reduced the head pain    This is a difficult situation. She has tried multiple medications in the past.  She is unable to take steroids due to reported rash and racing heart. Given the paraspinal muscle tenderness, I would recommend that she be on a muscle relaxant for the next week. I did discuss this with her today. Given her sedation with Zanaflex, I would recommend starting a different muscle relaxant. I would recommend starting baclofen at 5 mg twice a day for the next week. I did discuss with her that sedation is a common side effect, but we should try another medication to help relieve some of the muscle tenderness and spasm. She has tried therapy such as acupuncture, dry needling, deep tissue massage previously. I would recommend starting her on amitriptyline 25 mg at bedtime. Side effects were discussed with the patient. She will continue with her current doses of gabapentin and Fioricet. She will need to discuss other potential treatment options with her outpatient neurology providers.   this may include medications such as CGRP based medications and or botulinum toxin injections. She will continue to follow-up with her pain management and spinal surgeons for her cervical and lumbar degenerative spine disease. There is no other additional neurology recommendations at this time. If questions arise, please do not hesitate to contact me and I will return to see the patient. The patient should follow-up in clinic in approximately 4 weeks time after discharge with one of her primary neurology providers Axel Franco/Dr. Ronal Reina)                Subjective: My headaches are getting worse. History of Present Illness:   Annelise Goodrich is a 69 y.o. female with a longstanding history of migraines and occipital neuralgia who presented to the hospital due to persistence of severe head pain. She had recently received an occipital nerve block earlier in the week. In the emergency department she was treated with IV Toradol, Tylenol, IV fluids, IV Reglan, IV Compazine, IV magnesium, IV Haldol. Initial laboratory results include a sodium of 129, chloride 96, creatinine 0.56, LFTs were normal.  Albumin 2.8. The patient states that she has had a longstanding history of headache but a severe worsening over the past few weeks. She felt that after receiving her occipital nerve block earlier this week that her headaches and actually worsened. The headaches are predominantly in the right side. They are posterior in origin with radiating up to the top of her head and around her temporal region into her eye. Sometimes they are holocephalic. They can rate a 10 out of 10. She does feel that the Fioricet does help to reduce them a slight bit but then they do return. She has had headaches since the 1970s but the last 5 to 6 years have been worse. She is frustrated by the severity of the headaches. After receiving IV medications overnight last night, the patient states her headache is more tolerable and she does feel that she is able to go home today.   She does ask questions about other medication to take to prevent the headaches and has questions about botulinum toxin. Due to the severity of her headache last night she did have a head CT this morning. I did independently review the head CT from January 13, 2023. There is no acute intracranial abnormalities. Upon a review of the medical records, the patient has been followed in the neurology clinic by multiple providers. Most recently the patient has been seen by Dr. Shan Bruno and Tova Brandon. The patient was last seen as a virtual visit last week January 5, 2023. It was noted that the patient is also followed by pain management physicians. She has been using more butalbital due to the pain. She was taking gabapentin 1 to 2 tablets daily. It was noted that higher doses do cause her to have spasms. She was also tried topicals. She had also tried Cymbalta and Zanaflex. She felt in the past that Zanaflex made her more sedated. She does not remember having ever tried amitriptyline.   Past Medical History:   Diagnosis Date    Arthritis     Cancer ([facility] [address]     Skin    Colitis     Degenerative cervical spinal stenosis     Degenerative lumbar spinal stenosis     Hyperlipemia     Hypertension     Migraine     Occipital neuralgia of right side     Thyroid disease     Ulceration of vulva 01/17/2008    Wound of left leg 12/13/2017        Past Surgical History:   Procedure Laterality Date    COLONOSCOPY N/A 10/4/2021    COLONOSCOPY WITH BIOPSY performed by Sohail Meyer MD at [facility]COPY    COLONOSCOPY,DIAGNOSTIC  10/4/2021         HX ANKLE FRACTURE TX      HX APPENDECTOMY      HX COLONOSCOPY      HX GYN      hysterectomy at age 28    HX ORTHOPAEDIC Left 2017    rotator cuff repair        Family History   Problem Relation Age of Onset    Dementia Mother     Breast Cancer Mother     Other Mother         POLIO AND COLITIS    Cancer Mother         Breast cancer, alzheimers    COPD Father     Migraines Child Allergic Rhinitis Child     Sleep Apnea Child     Anesth Problems Neg Hx         Social History     Tobacco Use    Smoking status: Former    Smokeless tobacco: Never   Substance Use Topics    Alcohol use: Not Currently     Alcohol/week: 0.0 standard drinks     Comment: very occasionally        Allergies   Allergen Reactions    Cephalexin Other (comments)     Other reaction(s): Dermatological problems, e.g., rash, hives    Penicillins Rash     Other reaction(s): Dermatological problems, e.g., rash, hives  Other reaction(s): Dermatological problems, e.g., rash, hives      Aspirin Rash     Unable to take large dosages  Unable to take large dosages      Codeine Other (comments)     Rash, unable to take large dosages       Penicillamine Rash    Penicillamine Rash    Prednisone Other (comments)     Insomnia, frequent and heart racing    Synthroid [Levothyroxine] Rash and Itching     To breast area    Levothyroxine Itching and Rash     To breast area          Current Facility-Administered Medications   Medication Dose Route Frequency    diphenhydrAMINE (BENADRYL) injection 25 mg  25 mg IntraVENous Q6H PRN    ketorolac (TORADOL) injection 15 mg  15 mg IntraVENous Q6H PRN    prochlorperazine (COMPAZINE) injection 10 mg  10 mg IntraVENous Q6H PRN    pantoprazole (PROTONIX) 40 mg in 0.9% sodium chloride 10 mL injection  40 mg IntraVENous DAILY    sodium chloride (NS) flush 5-40 mL  5-40 mL IntraVENous Q8H    sodium chloride (NS) flush 5-40 mL  5-40 mL IntraVENous PRN    acetaminophen (TYLENOL) tablet 650 mg  650 mg Oral Q6H PRN    Or    acetaminophen (TYLENOL) suppository 650 mg  650 mg Rectal Q6H PRN    polyethylene glycol (MIRALAX) packet 17 g  17 g Oral DAILY PRN     Current Outpatient Medications   Medication Sig    gabapentin (NEURONTIN) 100 mg capsule Take  by mouth four (4) times daily. cholecalciferol, vitamin D3, 50 mcg (2,000 unit) tab Take  by mouth daily.     cyanocobalamin 1,000 mcg tablet Take 3,000 mcg by mouth daily. multivitamin (ONE A DAY) tablet Take 1 Tablet by mouth daily. vit A/vit C/vit E/zinc/copper (PRESERVISION AREDS PO) Take  by mouth daily. butalbital-acetaminophen-caffeine (FIORICET, ESGIC) -40 mg per tablet TAKE 2 TABLETS BY MOUTH EVERY 8 HOURS AS NEEDED FOR HEADACHE    budesonide (ENTOCORT EC) 3 mg capsule TAKE 3 CAPSULES BY MOUTH DAILY    B.infantis-B.ani-B.long-B.bifi (Probiotic 4X) 10-15 mg TbEC Take  by mouth daily. thyroid, Pork, (ARMOUR) 90 mg tablet [place] Thyroid 90 mg tablet   Take 1 tablet every day by oral route. atorvastatin (LIPITOR) 10 mg tablet every evening. diclofenac EC (VOLTAREN) 75 mg EC tablet 75 mg two (2) times a day. conjugated estrogens (PREMARIN) 0.625 mg tablet 0.625 mg daily. 3 times weekly       Review of Systems:    General, constitutional: Chronic severe neck pain and low back pain. Eyes, vision: negative  Ears, nose, throat: negative  Cardiovascular, heart: negative  Respiratory: negative  Gastrointestinal: negative  Genitourinary: negative  Musculoskeletal: negative  Skin and integumentary: negative  Psychiatric: negative  Endocrine: negative  Neurological: negative, except for HPI  Hematologic/lymphatic: negative  Allergy/immunology: negative    Objective:     Visit Vitals  BP (!) 148/68 (BP 1 Location: Left arm, BP Patient Position: At rest)   Pulse 88   Temp 98.3 °F (36.8 °C)   Resp 17   Ht 5' 5\" (1.651 m)   Wt 102 lb 11.8 oz (46.6 kg)   SpO2 98%   BMI 17.10 kg/m²       Physical Exam:      General:  appears well nourished in no acute distress, however she is in mild discomfort still  Neck: no carotid bruits  Lungs: clear to auscultation  Heart:  no murmurs, regular rate  Lower extremity: peripheral pulses palpable and no edema  Skin: intact    Neurological exam:    Awake, alert, oriented to person, place and time  Recent and remote memory were normal  Attention and concentration were intact  Language was intact.   There was no aphasia  Speech: no dysarthria  Fund of knowledge was preserved    Cranial nerves:   II-XII were tested    Perrrla  Fundoscopic examination revealed venous pulsations and no clear abnormalities  Visual fields were full  Eomi, no evidence of nystagmus  Facial sensation:  normal and symmetric  Facial motor: normal and symmetric  Hearing intact  SCM strength intact  Tongue: midline without fasciculations    Motor: Tone normal  She does have significant cervical paraspinal muscle tenderness. Pronator drift was absent  No evidence of fasciculations    Strength testing:   deltoid triceps biceps Wrist ext. Wrist flex. intrinsics Hip flex. Hip ext. Knee ext. Knee flex Dorsi flex Plantar flex   Right 5 5 5 5 5 5 5 5 5 5 5 5   Left 5 5 5 5 5 5 5 5 5 5 5 5     Pain does limit her ability to sustain a contraction. Sensory:  Upper extremity: intact to pp,  Lower extremity: intact to pp    Reflexes:    Right Left  Biceps  2 2  Triceps            2 2  Brachiorad.  2 2  Patella  2 2  Achilles 1 1    Plantar response:  flexor bilaterally    Cerebellar testing:  no tremor apparent, finger/nose and clara were intact  Gait: This was not assessed in the emergency department this morning due to discomfort persisting  Labs:     Lab Results   Component Value Date/Time    Hemoglobin A1c 5.4 01/09/2023 11:45 AM    Sodium 129 (L) 01/13/2023 01:43 AM    Potassium 3.8 01/13/2023 01:43 AM    Chloride 96 (L) 01/13/2023 01:43 AM    Glucose 80 01/13/2023 01:43 AM    BUN 13 01/13/2023 01:43 AM    Creatinine 0.56 01/13/2023 01:43 AM    Calcium 9.2 01/13/2023 01:43 AM    WBC 8.1 01/13/2023 01:43 AM    HCT 33.6 (L) 01/13/2023 01:43 AM    HGB 11.1 (L) 01/13/2023 01:43 AM    PLATELET 172 60/72/1283 01:43 AM       Imaging:    Results from Abstract encounter on 05/11/22    MRI SHOULDER RT WO CONT      Results from Hospital Encounter encounter on 01/12/23    CT HEAD WO CONT    Narrative  EXAM: CT HEAD WO CONT    INDICATION: Rule out bleed    COMPARISON: MRI dated 10/9/2020. CONTRAST: None. TECHNIQUE: Unenhanced CT of the head was performed using 5 mm images. Brain and  bone windows were generated. Coronal and sagittal reformats. CT dose reduction  was achieved through use of a standardized protocol tailored for this  examination and automatic exposure control for dose modulation. FINDINGS:  The ventricles and sulci are normal in size, shape and configuration. . There is  no significant white matter disease. There is no intracranial hemorrhage,  extra-axial collection, or mass effect. The basilar cisterns are open. No CT  evidence of acute infarct. The bone windows demonstrate no abnormalities. The visualized portions of the  paranasal sinuses and mastoid air cells are clear. Impression  No acute intracranial abnormality. Complex decision making was necessary due to the patient's current medical condition and other medical co-morbidities.         Active Problems:    Headache (1/12/2023)                   Signed By:  Harshad Paniagua DO FAAN    January 13, 2023

## 2023-01-13 NOTE — H&P
PLEASE NOTE: I HAVE GENERATED THIS NOTE WITH THE ASSISTANCE OF VOICE-RECOGNITION TECHNOLOGY. PLEASE EXCUSE ANY SPELLING, GRAMMATICAL, AND SYNTAX ERRORS YOU MAY FIND. IF YOU NEED CLARIFICATION ON ANYTHING, PLEASE REACH OUT TO ME.  [facility]  History and Physical - Dr. Chris Celis:     69 y.o. female with pertinent medical hx of migraines presented with severe migraine that is not resolved despite taking her medication. Differential diagnosis, explanation and analysis: Patient's headaches have not completely resolved with, need to rule out a subdural hematoma. Otherwise on differential at this time relates just migraines    Acute problems:    Acute headache  -Neurology on consult  -On as needed Compazine, Benadryl, Toradol to be given at same time every 6 as needed  -We will obtain a CT head to rule out any other pathology    PRN Orders: On Compazine, marked zofran, + ACEP, + bowel regimen    Chronic Problems:    Patient's med list in the computer is not up-to-date, I am gone pharmacy to reconcile patient's medications    General Admission Parameters:    GI Prophylaxis: Not indicated  Gathering data from Micki, et. al; Edi et al; and [facility], et al - Please note that routine use of GI PPX in all patients is inappropriate    DVT Prophylaxis: SCD  I have used the Padua score to determine if patient needs DVT PPX.   Please note that routine use of DVT PPX in all patients is inappropriate    Code status: Full  If code status was discussed with the patient, then code status entered as per the orders                                                                                      Subjective:   Primary Care Provider: Terry Jung MD  Date of Service:  See Date Note Was Originated  Chief Complaint: Headache    69 y.o. female presents with 1 day duration of abrupt onset, rapidly worsening, severe, constant, sharp, unilateral right-sided occipital, nonradiating headache that is not associate with any other symptoms, remitted by nothing, exacerbated by nothing. Further history: Patient states that she gets occipital headaches very often, and usually resolves with Fioricet, but this is not helped or not. When he gets this bad, she states that neurology has to give her occipital injections. Patient denies any focal neurological deficits;    Pertinent chart review: Patient has presented with occipital neuralgia and headaches multiple times. She also has a history of cervical spondylolysis    Review of Systems:  12 point ROS obtained and otherwise negative, except as per HPI and above. Past Medical History:   Diagnosis Date    Arthritis     Cancer ([facility] [address]     Skin    Colitis     Degenerative cervical spinal stenosis     Degenerative lumbar spinal stenosis     Hyperlipemia     Hypertension     Migraine     Occipital neuralgia of right side     Thyroid disease     Ulceration of vulva 01/17/2008    Wound of left leg 12/13/2017      Past Surgical History:   Procedure Laterality Date    COLONOSCOPY N/A 10/4/2021    COLONOSCOPY WITH BIOPSY performed by Lanre Marquez MD at [facility] ENDOSCOPY    COLONOSCOPY,DIAGNOSTIC  10/4/2021         HX ANKLE FRACTURE TX      HX APPENDECTOMY      HX COLONOSCOPY      HX GYN      hysterectomy at age 28    HX ORTHOPAEDIC Left 2017    rotator cuff repair     Prior to Admission medications    Medication Sig Start Date End Date Taking? Authorizing Provider   gabapentin (NEURONTIN) 100 mg capsule Take  by mouth four (4) times daily. Provider, Historical   cholecalciferol, vitamin D3, 50 mcg (2,000 unit) tab Take  by mouth daily. Provider, Historical   cyanocobalamin 1,000 mcg tablet Take 3,000 mcg by mouth daily. Provider, Historical   multivitamin (ONE A DAY) tablet Take 1 Tablet by mouth daily. Provider, Historical   vit A/vit C/vit E/zinc/copper (PRESERVISION AREDS PO) Take  by mouth daily.     Provider, Historical butalbital-acetaminophen-caffeine (FIORICET, ESGIC) -40 mg per tablet TAKE 2 TABLETS BY MOUTH EVERY 8 HOURS AS NEEDED FOR HEADACHE 6/9/22   Rafael Franco, RIGO-C   budesonide (ENTOCORT EC) 3 mg capsule TAKE 3 CAPSULES BY MOUTH DAILY 1/3/22   Provider, Historical   B.infantis-B.ani-B.long-B.bifi (Probiotic 4X) 10-15 mg TbEC Take  by mouth daily. Provider, Historical   thyroid, Pork, (ARMOUR) 90 mg tablet [place] Thyroid 90 mg tablet   Take 1 tablet every day by oral route. Provider, Historical   atorvastatin (LIPITOR) 10 mg tablet every evening. 6/30/20   Provider, Historical   diclofenac EC (VOLTAREN) 75 mg EC tablet 75 mg two (2) times a day. 7/26/20   Provider, Historical   conjugated estrogens (PREMARIN) 0.625 mg tablet 0.625 mg daily. 3 times weekly    Provider, Historical     Allergies   Allergen Reactions    Cephalexin Other (comments)     Other reaction(s): Dermatological problems, e.g., rash, hives    Penicillins Rash     Other reaction(s): Dermatological problems, e.g., rash, hives  Other reaction(s): Dermatological problems, e.g., rash, hives      Aspirin Rash     Unable to take large dosages  Unable to take large dosages      Codeine Other (comments)     Rash, unable to take large dosages       Penicillamine Rash    Penicillamine Rash    Prednisone Other (comments)     Insomnia, frequent and heart racing    Synthroid [Levothyroxine] Rash and Itching     To breast area    Levothyroxine Itching and Rash     To breast area      Family History   Problem Relation Age of Onset    Dementia Mother     Breast Cancer Mother     Other Mother         POLIO AND COLITIS    Cancer Mother         Breast cancer, alzheimers    COPD Father     Migraines Child     Allergic Rhinitis Child     Sleep Apnea Child     Anesth Problems Neg Hx    .   Social History     Socioeconomic History    Marital status:    Tobacco Use    Smoking status: Former    Smokeless tobacco: Never   Vaping Use    Vaping Use: Never used   Substance and Sexual Activity    Alcohol use: Not Currently     Alcohol/week: 0.0 standard drinks     Comment: very occasionally    Drug use: Never    Sexual activity: Yes     Partners: Male     Birth control/protection: None   . Objective:   Physical Exam:     VS as below    Const'l:          Normal body habitus, a&o, no acute distress  Head/Neck:       no cervical/head mass  Eyes:     nonicteric sclera, eom intact  ENT:      auditory acuity grossly intact either with or without device, no nasal deformity  Cardio:           Regular rate regular rhythm  Pulm:     no accessory muscle use  Abd:       S NT ND  Derm:     no rashes, no ulcers, no lesions  Extr:      No edema, no cyanosis, no calf tenderness, no varicosities  Neuro:    cn II-XII intact  Psych:   mood intact, judgement intact    Data Review: All diagnostic labs and studies have been reviewed.

## 2023-01-17 NOTE — PROGRESS NOTES
Physician Progress Note      Nadeen Spring  [facility] #:                  [id number]  :                       1945  ADMIT DATE:       2023 12:24 PM  [address] [place] DATE:        2023 1:31 PM  RESPONDING  PROVIDER #:        Maggy Sousa MD          QUERY TEXT:    Patient admitted with severe migraine, noted to have a BMI of 17.10 kg/m. Please clarify the clinical significance if any of the BMI:    The medical record reflects the following:  Risk Factors: BMI 17.1    Clinical Indicators:  Ht. 5' 5\" (1.651 m)  Wt. 102 lb. 11.8 oz (46.6 kg)  BMI 17.10 kg/m? Treatment: Monitoring  Options provided:  -- Underweight  -- BMI not clinically significant  -- Other - I will add my own diagnosis  -- Disagree - Not applicable / Not valid  -- Disagree - Clinically unable to determine / Unknown  -- Refer to Clinical Documentation Reviewer    PROVIDER RESPONSE TEXT:    This patient is Underweight.     Query created by: Marena Dandy on 2023 12:11 PM      Electronically signed by:  Maggy Sousa MD 2023 12:15 PM

## 2023-01-17 NOTE — TELEPHONE ENCOUNTER
Daughter Kay Jones states Dr. Edin Oneal, Pain management advised pt to call for for HF for headaches. Dr. Dread Beyer has not seen pt in 2.5 years. Advising pt go back to Dr. Jovanni Fletcher or see ELIANA Franco NP, who has seen pt more recently. Will discuss with Shira Mensah NP for advice.       Boni Lopez MD  You 3 days ago     Puma Shaw I have not seen her in 2-1/2 years, and Bridget was following her recently, and she has a pain management physician, and she needs to see her pain management physician who she just saw at [address] [place] [address], or she can see Bridget for follow-up

## 2023-01-17 NOTE — TELEPHONE ENCOUNTER
Spoke with patient. Verified patient with two patient identifiers. States she had the nerve block by Dr. Mitesh Pearson the Tues before she went to the ER. This caused severe muscle aches/pain/spasms and she wound up in the ER. Does not want another one. She is finally on Baclofen by ER doctor which has helped a lot. She is cutting her Amitriptyline in half tonight to see if it helps her not be so groggy and she has almost fallen several times during the night while taking it. If she is still groggy, she is going to stop it. She still takes the Gabapentin and Fioricet. Right now her HA is much better, very mild right now. The Baclofen has definitely helped the muscle pain and would like a Rx for this. States Dr. Jovanni Fletcher advised her to see our group. Has appt with Dr. Jovanni Fletcher in 4 months and Dr. Dread Beyer on 9-. Just saw ELIANA Paul NP on 1-5-2023. Asking who is going to see her? Pls advise.

## 2023-01-18 ENCOUNTER — TELEPHONE (OUTPATIENT)
Dept: NEUROLOGY | Age: 78
End: 2023-01-18

## 2023-01-18 NOTE — TELEPHONE ENCOUNTER
I called the patient, told her she did not have to be seen, as long as she is better just take the baclofen that she thinks is making her better, we can see her at her regular follow-up, if she needs refills we can send that in for her, but she has had all the test done and is nothing else we will do other than give her medicine like that or send her to pain management, and she is already seeing Dr. Jaime Guzman so the neck step is spine and pain management for medication

## 2023-01-19 DIAGNOSIS — G44.86 CERVICOGENIC HEADACHE: Primary | ICD-10-CM

## 2023-01-19 RX ORDER — BACLOFEN 5 MG/1
5 TABLET ORAL EVERY 12 HOURS
Qty: 60 TABLET | Refills: 11 | Status: SHIPPED | OUTPATIENT
Start: 2023-01-19

## 2023-01-19 NOTE — TELEPHONE ENCOUNTER
Patient called stating that she will run out of the Baclofen tomorrow morning (1/20) and asked that we please we refill ASAP. Pharmacy Name:  [facility] #:  [phone]    Patient also called stating that she spoke with Dr Maryanne Damon, her pain management doctor, and he told her to ask Dr Jaime Regalado to write her a new order for physical & massage therapy so she may continue it. Patient stated she has been going to [facility] one day a week. Please advise. (Pt's #: [phone])      [facility] Ph #: [phone]    Email: [email or web address]

## 2023-01-19 NOTE — TELEPHONE ENCOUNTER
ER doctor gave Baclofen 5 mg BID, # 14 with no refills on 1-. Asking for ASAP refill. ER doctor gave Amitriptyline 25 mg one qhs, # 30 with one refill on 1-. Are you going to fill these? Has appt with ELIANA Sheriff, GARRETT on 1-. Pls advise.

## 2023-01-20 NOTE — TELEPHONE ENCOUNTER
Spoke with patient. Verified patient with two patient identifiers. Advised Dr. Blessing Jensen had sent in Rx for [facility]. She has picked it up. Patient verbalized understanding.

## 2023-01-24 ENCOUNTER — OFFICE VISIT (OUTPATIENT)
Dept: NEUROLOGY | Age: 78
End: 2023-01-24
Payer: MEDICARE

## 2023-01-24 VITALS
WEIGHT: 105 LBS | RESPIRATION RATE: 15 BRPM | OXYGEN SATURATION: 98 % | DIASTOLIC BLOOD PRESSURE: 82 MMHG | BODY MASS INDEX: 17.49 KG/M2 | HEIGHT: 65 IN | HEART RATE: 66 BPM | TEMPERATURE: 98.6 F | SYSTOLIC BLOOD PRESSURE: 134 MMHG

## 2023-01-24 DIAGNOSIS — G44.86 CERVICOGENIC HEADACHE: ICD-10-CM

## 2023-01-24 DIAGNOSIS — M54.81 OCCIPITAL NEURALGIA OF RIGHT SIDE: Primary | ICD-10-CM

## 2023-01-24 DIAGNOSIS — G43.719 INTRACTABLE CHRONIC MIGRAINE WITHOUT AURA AND WITHOUT STATUS MIGRAINOSUS: ICD-10-CM

## 2023-01-24 DIAGNOSIS — F32.1 CURRENT MODERATE EPISODE OF MAJOR DEPRESSIVE DISORDER WITHOUT PRIOR EPISODE (HCC): ICD-10-CM

## 2023-01-24 PROCEDURE — G8427 DOCREV CUR MEDS BY ELIG CLIN: HCPCS | Performed by: PSYCHIATRY & NEUROLOGY

## 2023-01-24 PROCEDURE — 99215 OFFICE O/P EST HI 40 MIN: CPT | Performed by: PSYCHIATRY & NEUROLOGY

## 2023-01-24 PROCEDURE — 1123F ACP DISCUSS/DSCN MKR DOCD: CPT | Performed by: PSYCHIATRY & NEUROLOGY

## 2023-01-24 PROCEDURE — 1111F DSCHRG MED/CURRENT MED MERGE: CPT | Performed by: PSYCHIATRY & NEUROLOGY

## 2023-01-24 PROCEDURE — 1090F PRES/ABSN URINE INCON ASSESS: CPT | Performed by: PSYCHIATRY & NEUROLOGY

## 2023-01-24 PROCEDURE — G8400 PT W/DXA NO RESULTS DOC: HCPCS | Performed by: PSYCHIATRY & NEUROLOGY

## 2023-01-24 PROCEDURE — G8419 CALC BMI OUT NRM PARAM NOF/U: HCPCS | Performed by: PSYCHIATRY & NEUROLOGY

## 2023-01-24 PROCEDURE — G8536 NO DOC ELDER MAL SCRN: HCPCS | Performed by: PSYCHIATRY & NEUROLOGY

## 2023-01-24 PROCEDURE — G8432 DEP SCR NOT DOC, RNG: HCPCS | Performed by: PSYCHIATRY & NEUROLOGY

## 2023-01-24 PROCEDURE — 1101F PT FALLS ASSESS-DOCD LE1/YR: CPT | Performed by: PSYCHIATRY & NEUROLOGY

## 2023-01-24 RX ORDER — DULOXETIN HYDROCHLORIDE 30 MG/1
30 CAPSULE, DELAYED RELEASE ORAL DAILY
Qty: 90 CAPSULE | Refills: 1 | Status: SHIPPED | OUTPATIENT
Start: 2023-01-24

## 2023-01-24 RX ORDER — GALCANEZUMAB 120 MG/ML
240 INJECTION, SOLUTION SUBCUTANEOUS ONCE
Qty: 2 ML | Refills: 0 | Status: SHIPPED | COMMUNITY
Start: 2023-01-24 | End: 2023-01-24

## 2023-01-24 RX ORDER — PREGABALIN 25 MG/1
25 CAPSULE ORAL 3 TIMES DAILY
Qty: 90 CAPSULE | Refills: 5 | Status: SHIPPED | OUTPATIENT
Start: 2023-01-24

## 2023-01-24 RX ORDER — GALCANEZUMAB 120 MG/ML
120 INJECTION, SOLUTION SUBCUTANEOUS
Qty: 1 ML | Refills: 11 | Status: SHIPPED | OUTPATIENT
Start: 2023-01-24 | End: 2023-01-27 | Stop reason: SDUPTHER

## 2023-01-24 NOTE — ASSESSMENT & PLAN NOTE
Secondary to degenerative spinal arthritis and occipital neuralgia    Discontinue gabapentin  Start Lyrica 25 mg 3 times daily  Start Cymbalta 30 mg daily discontinue amitriptyline  Continue baclofen 5 mg twice daily

## 2023-01-24 NOTE — ASSESSMENT & PLAN NOTE
Continue on baclofen 5 mg twice daily  Will discontinue amitriptyline and start Cymbalta 30 mg daily  We will discontinue gabapentin and start Lyrica 25 mg 3 times daily    Patient did not have a good response to the past occipital nerve block with the Marcaine and the Depo-Medrol from Dr. Lieutenant Clark and in fact it seemed to exacerbate her headaches  But previously it seems she had gotten a good response with the Marcaine and triamcinolone  Will defer treatment to Dr. Lieutenant Clark going forward regarding interventional management

## 2023-01-24 NOTE — LETTER
1/24/2023    Patient: Kyler Gonzalez   YOB: 1945   Date of Visit: 1/24/2023     Alessia Enciso MD  [facility]  [address]  [address] [address]  Via Fax: [phone]    Dear Alessia Enciso MD,      Thank you for referring Ms. Wallace Renae to [address] [place] [address] for evaluation. My notes for this consultation are attached. If you have questions, please do not hesitate to call me. I look forward to following your patient along with you.       Sincerely,    RIGO Mercedes-C

## 2023-01-24 NOTE — ASSESSMENT & PLAN NOTE
Discontinue amitriptyline start Cymbalta 30 mg daily strongly encouraged the patient to seek grief counseling and gave her some community resources to help her find a counselor we also talked about some behavioral interventions to include meditation and journaling    Patient does have a very supportive daughter who is with her today

## 2023-01-24 NOTE — ASSESSMENT & PLAN NOTE
We will start Emgality loading dose given at today's office visit prescription sent to her local pharmacy Common and serious side effects discussed along with realistic expectations    Patient is previously been tried on Cymbalta and amitriptyline    Rescue medications continue to be Fioricet at this time  Gabapentin has been discontinued

## 2023-01-24 NOTE — PROGRESS NOTES
[facility]  In Office FOLLOW-UP VISIT         Anita Kilgore is a 69 y.o. female who presents today for the following:  Chief Complaint   Patient presents with    Follow-up     Uncontrollable migraine took patient to the hospital.  And talked to [facility] on Thursday and had her injections and had a strong reaction to them. ASSESSMENT AND PLAN    1. Occipital neuralgia of right side  Assessment & Plan:   Continue on baclofen 5 mg twice daily  Will discontinue amitriptyline and start Cymbalta 30 mg daily  We will discontinue gabapentin and start Lyrica 25 mg 3 times daily    Patient did not have a good response to the past occipital nerve block with the Marcaine and the Depo-Medrol from Dr. Golden Lim and in fact it seemed to exacerbate her headaches  But previously it seems she had gotten a good response with the Marcaine and triamcinolone  Will defer treatment to Dr. Golden Lim going forward regarding interventional management    Orders:  -     pregabalin (LYRICA) 25 mg capsule; Take 1 Capsule by mouth three (3) times daily. Max Daily Amount: 75 mg., Normal, Disp-90 Capsule, R-5  -     DULoxetine (CYMBALTA) 30 mg capsule; Take 1 Capsule by mouth daily. Indications: anxiousness associated with depression, chronic muscle or bone pain, neuropathic pain, Normal, Disp-90 Capsule, R-1  2. Intractable chronic migraine without aura and without status migrainosus  Assessment & Plan: We will start Emgality loading dose given at today's office visit prescription sent to her local pharmacy Common and serious side effects discussed along with realistic expectations    Patient is previously been tried on Cymbalta and amitriptyline    Rescue medications continue to be Fioricet at this time  Gabapentin has been discontinued  Orders:  -     galcanezumab-gnlm (Emgality Pen) 120 mg/mL injection; 2 mL by SubCUTAneous route once for 1 dose.  Indications: migraine prevention, Sample, Disp-2 mL, R-0  - galcanezumab-gnlm (Emgality Pen) 120 mg/mL injection; 1 mL by SubCUTAneous route every thirty (30) days. Indications: migraine prevention, Normal, Disp-1 mL, R-11  -     DULoxetine (CYMBALTA) 30 mg capsule; Take 1 Capsule by mouth daily. Indications: anxiousness associated with depression, chronic muscle or bone pain, neuropathic pain, Normal, Disp-90 Capsule, R-1  3. Cervicogenic headache  Assessment & Plan:   Secondary to degenerative spinal arthritis and occipital neuralgia    Discontinue gabapentin  Start Lyrica 25 mg 3 times daily  Start Cymbalta 30 mg daily discontinue amitriptyline  Continue baclofen 5 mg twice daily  Orders:  -     pregabalin (LYRICA) 25 mg capsule; Take 1 Capsule by mouth three (3) times daily. Max Daily Amount: 75 mg., Normal, Disp-90 Capsule, R-5  -     DULoxetine (CYMBALTA) 30 mg capsule; Take 1 Capsule by mouth daily. Indications: anxiousness associated with depression, chronic muscle or bone pain, neuropathic pain, Normal, Disp-90 Capsule, R-1  4. Current moderate episode of major depressive disorder without prior episode [facility])  Assessment & Plan:  Discontinue amitriptyline start Cymbalta 30 mg daily strongly encouraged the patient to seek grief counseling and gave her some community resources to help her find a counselor we also talked about some behavioral interventions to include meditation and journaling    Patient does have a very supportive daughter who is with her today  Orders:  -     DULoxetine (CYMBALTA) 30 mg capsule; Take 1 Capsule by mouth daily. Indications: anxiousness associated with depression, chronic muscle or bone pain, neuropathic pain, Normal, Disp-90 Capsule, R-1      Patient and/or family was given time to ask questions and voice concerns. I believe all questions concerns were adequately addressed at this  office visit.   Patient and/or family also verbalized agreement and understanding of the above-stated plan    Complex neurologic decision making secondary any or all of the following to include unclear etiology, and /or polypharmacy, and/or significant comorbid conditions, and/or use of high-risk medications which complicate the decision making process related to patient's neurologic diagnosis          ICD-10-CM ICD-9-CM    1. Occipital neuralgia of right side  M54.81 723.8 pregabalin (LYRICA) 25 mg capsule      DULoxetine (CYMBALTA) 30 mg capsule      2. Intractable chronic migraine without aura and without status migrainosus  G43.719 346.71 galcanezumab-gnlm (Emgality Pen) 120 mg/mL injection      galcanezumab-gnlm (Emgality Pen) 120 mg/mL injection      DULoxetine (CYMBALTA) 30 mg capsule      3. Cervicogenic headache  G44.86 784.0 pregabalin (LYRICA) 25 mg capsule      DULoxetine (CYMBALTA) 30 mg capsule      4. Current moderate episode of major depressive disorder without prior episode (HCC)  F32.1 296.22 DULoxetine (CYMBALTA) 30 mg capsule            I attest that 40 minutes was spent on today's visit reviewing medical records and diagnostic testing deemed pertinent to this patient's care, along with direct time spent at patient's visit including the history, physical assessment and plan, discussing diagnosis and management along with documentation.       HPI  Historical Data  Patient is known to the practice and has been previously seen by Dr. 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 Joseline Motta     Interim Data:      Headaches and migraines  Patient is here with her daughter but history is mostly from the patient    Patient was seen while in the hospital for worsening headaches and migraines by Dr. Tai Bustillo on 1/13/2023 1 is invited to review his consult note for details but in summary:   Patient was given migraine cocktail   She was started on a muscle relaxer as well as amitriptyline   She was to continue gabapentin and the Fioricet     Patient has been worsening right-sided occipital neuralgia since her office visit in January and finally got the occipital nerve block completed by Dr. Joseline Motta and it actually flared up the headache even worse   According to his note of 1/10/23:  it was a combination of Marcaine and Depo-Medrol bilaterally for occipital nerve blocks [   On previous nerve block from August 2022 Marcaine and 10mg triamcinolone was used    She is trying not to use her Fioricet and she is using more gabapentin but it makes her quite sedated she is not able to drive and do her normal activities both due to pain and sedation  She continues with topicals such as Voltaren gel and CBD topicals     And she historically uses massage and traction which normally is very beneficial    Patient states that her headaches are better than when she was in the hospital but they are not back to baseline for her usual headache frequency and severity    Patient attest that the pain is more on the right side than the left and it comes up from the back of the head and over the face and into the right side of her face and to a lesser extent just on the left side the back of the head            Other:  Scheduled to have left knee arthroscopic surgery 1/16/2023 but this has been postponed secondary to the migraine issues    Patient is still heavily grieving the loss of her son from October 2021 from [address]  Daughter feels as though she has significant issues related to depression          Results from Abstract encounter on 05/11/22    MRI SHOULDER RT WO CONT      MRI CERV SPINE WO CONT      MRI CERV SPINE WO CONT      Allergies   Allergen Reactions    Cephalexin Other (comments)     Other reaction(s): Dermatological problems, e.g., rash, hives    Penicillins Rash     Other reaction(s): Dermatological problems, e.g., rash, hives  Other reaction(s): Dermatological problems, e.g., rash, hives      Aspirin Rash     Unable to take large dosages  Unable to take large dosages      Codeine Other (comments)     Rash, unable to take large dosages       Penicillamine Rash    Penicillamine Rash    Prednisone Other (comments)     Insomnia, frequent and heart racing    Synthroid [Levothyroxine] Rash and Itching     To breast area    Levothyroxine Itching and Rash     To breast area       Current Outpatient Medications   Medication Sig    galcanezumab-gnlm (Emgality Pen) 120 mg/mL injection 2 mL by SubCUTAneous route once for 1 dose. Indications: migraine prevention    galcanezumab-gnlm (Emgality Pen) 120 mg/mL injection 1 mL by SubCUTAneous route every thirty (30) days. Indications: migraine prevention    pregabalin (LYRICA) 25 mg capsule Take 1 Capsule by mouth three (3) times daily. Max Daily Amount: 75 mg. DULoxetine (CYMBALTA) 30 mg capsule Take 1 Capsule by mouth daily. Indications: anxiousness associated with depression, chronic muscle or bone pain, neuropathic pain    baclofen 5 mg tab Take 5 mg by mouth every twelve (12) hours. vitamins A,C,E-zinc-copper (Ocuvite PreserVision) 2,148 mcg-113 mg-45 mg-17.4mg tab tablet Take 1 Tablet by mouth daily. cholecalciferol, vitamin D3, 50 mcg (2,000 unit) tab Take 2,000 Units by mouth daily. cyanocobalamin 1,000 mcg tablet Take 3,000 mcg by mouth daily.     multivitamin (ONE A DAY) tablet Take 1 Tablet by mouth daily. butalbital-acetaminophen-caffeine (FIORICET, ESGIC) -40 mg per tablet TAKE 2 TABLETS BY MOUTH EVERY 8 HOURS AS NEEDED FOR HEADACHE    budesonide (ENTOCORT EC) 3 mg capsule TAKE 3 CAPSULES BY MOUTH DAILY    B.infantis-B.ani-B.long-B.bifi (Probiotic 4X) 10-15 mg TbEC Take  by mouth daily. thyroid, Pork, (ARMOUR) 90 mg tablet Take 90 mg by mouth daily. atorvastatin (LIPITOR) 10 mg tablet Take 10 mg by mouth every evening. diclofenac EC (VOLTAREN) 75 mg EC tablet 75 mg two (2) times a day. conjugated estrogens (PREMARIN) 0.625 mg tablet Take 0.625 mg by mouth three (3) days a week. 3 times weekly, no specific days     No current facility-administered medications for this visit. Past medical history/surgical history, family history, and social history have been reviewed for today's visit      ROS    A ten system review of constitutional, cardiovascular, respiratory, musculoskeletal, endocrine, skin, SHEENT, genitourinary, psychiatric and neurologic systems was obtained and is unremarkable except as mentioned under HPI          EXAMINATION:     Visit Vitals  /82 (BP 1 Location: Left upper arm, BP Patient Position: Sitting, BP Cuff Size: Adult)   Pulse 66   Temp 98.6 °F (37 °C) (Temporal)   Resp 15   Ht 5' 5\" (1.651 m)   Wt 105 lb (47.6 kg)   SpO2 98%   BMI 17.47 kg/m²         General appearance: Patient is well-developed and well-nourished in no apparent distress and well groomed.     Psych/mental health:  Affect: Tearful especially when talking about her son ; appropriate    PHQ  3 most recent PHQ Screens 1/24/2023   Little interest or pleasure in doing things Not at all   Feeling down, depressed, irritable, or hopeless Not at all   Total Score PHQ 2 0   Trouble falling or staying asleep, or sleeping too much -   Feeling tired or having little energy -   Poor appetite, weight loss, or overeating -   Feeling bad about yourself - or that you are a failure or have let yourself or your family down -   Trouble concentrating on things such as school, work, reading, or watching TV -   Moving or speaking so slowly that other people could have noticed; or the opposite being so fidgety that others notice -   Thoughts of being better off dead, or hurting yourself in some way -   PHQ 9 Score -   How difficult have these problems made it for you to do your work, take care of your home and get along with others -       HEENT:   Normocephalic  With evidence of trauma: No  Full range of motion head neck: Yes  Tenderness to palpation of the head neck region: No      Cardiovascular:     Extremities warm to touch: Yes  Extremity swelling: No  Discoloration: No  Evidence of PVD: No    Respiratory:   Dyspnea on exertion: No   Abnormal effort on casual observation: No   Use of portable oxygen: No   Evidence of cyanosis: No     Musculoskeletal:   Evidence of significant bone deformities: No   Spinal curvature: No     Integumentary:    Obvious bruising: No   Lacerations or discoloration on casual observation: No       Neurological Examination:   Mental Status:        MMSE  No flowsheet data found. Formal testing was not completed    there was nothing concerning on general observation and discussion.    Alert oriented and appropriate to general conversation  Normal processing on general observation  Followed conversation and responded seemingly appropriate throughout the office visit  No word finding difficulties noted on casual observation  Able to follow directions without difficulty     Cranial Nerves:    Grossly intact    Motor:   Normal bulk  No tremor appreciated on today's exam  No abnormal movements appreciated on today's exam  Moves extremities spontaneously and with purpose        Sensation: Intact to light touch    Coordination/Cerebellar:   Grossly intact    Gait: Ambulates independently    Reflexes: Not tested    Fall risk assessment  Fall Risk Assessment, last 12 mths 1/24/2023   Able to walk? Yes   Fall in past 12 months? 1   Do you feel unsteady? 1   Are you worried about falling 1   Is TUG test greater than 12 seconds? 0   Is the gait abnormal? 1   Number of falls in past 12 months 2   Fall with injury? 1           Follow-up and Dispositions    Return for Next available.            Orlando Vicente MS, ANP-BC, [facility]

## 2023-01-24 NOTE — PROGRESS NOTES
Chief Complaint   Patient presents with    Follow-up     Uncontrollable migraine took patient to the hospital.  And talked to [facility] on Thursday and had her injections and had a strong reaction to them.

## 2023-01-24 NOTE — PATIENT INSTRUCTIONS
As per our discussion    We have started you on Emgality today you received a loading dose after that you receive 1 dose per month on around the 24th of each month it does not have to be an exact date can be a day or 2 before her day or 2 after what ever works best for you but keep it around the 24th    I am going to discontinue the gabapentin and I want you to start on low-dose Lyrica 25 mg taking 1 tablet 3 times a day  We can go up on this dose as needed but I know that you are sensitive to medications and I do not want you to be sleepy and sedated in addition Lyrica we can often use much lower doses than gabapentin to get similar affect    Once you are established on the Lyrica then I would like you to switch out the amitriptyline that you are taking at bedtime for Cymbalta 30 mg 1 time per day  It does not matter whether you take the Cymbalta in the morning or the evening what ever works best for you  We are using Cymbalta for multiple purposes to include the occipital neuralgia, the migraines, the neck and upper back pain, as well as depression    We talked about ways to help deal with grief to include walking with your dog which gives you much pleasure  I want you to continue doing that  I want you to do some more meditation to help your mind to rest more  There is a very good wilda called [employer] timer for this purpose  You can also find good meditation actually on YouTube  Both of these are free  Rosa Elena Antis is also a good way to help  And I want you to consider getting some counseling you can go to psychology today. com input your ZIP Code and up will populate a list of therapist in your region you can review their bios and reach out to them as appropriate also [facility] works in a similar way you can go to the website      Office Policies      Appointments  Please make sure that you arrive for your next appointment at least 15 minutes prior to your appointment time.     If for some reason you are going to be late please notify the office to determine if you need to be rescheduled or we can adjust your appointment time      Phone calls/patient messages:  Please allow up to 24 hours for someone in the office to contact you about your call or message. Be mindful your provider may be out of the office or your message may require further review. We encourage you to use [employer] for your messages as this is a faster, more efficient way to communicate with our office    Medication Refills:  Prescription medications require up to 48 business hours to process. We encourage you to use [employer] for your refills. For controlled medications: Please allow up to 72 business hours to process. Certain medications may require you to  a written prescription at our office. NO narcotic/controlled medications will be prescribed after 4pm Monday through Friday or on weekends    Form/Paperwork Completion:  We ask that you allow 7-14 business days. You may also download your forms to [employer] to have your doctor print off.

## 2023-01-27 DIAGNOSIS — G43.719 INTRACTABLE CHRONIC MIGRAINE WITHOUT AURA AND WITHOUT STATUS MIGRAINOSUS: ICD-10-CM

## 2023-01-27 RX ORDER — GALCANEZUMAB 120 MG/ML
120 INJECTION, SOLUTION SUBCUTANEOUS
Qty: 3 ML | Refills: 3 | Status: SHIPPED | OUTPATIENT
Start: 2023-01-27 | End: 2023-04-27

## 2023-01-30 DIAGNOSIS — G44.209 TENSION VASCULAR HEADACHE: Primary | ICD-10-CM

## 2023-01-30 RX ORDER — BACLOFEN 10 MG/1
5 TABLET ORAL 3 TIMES DAILY
Qty: 45 TABLET | Refills: 2 | Status: SHIPPED | OUTPATIENT
Start: 2023-01-30

## 2023-02-23 DIAGNOSIS — M54.81 OCCIPITAL NEURALGIA OF RIGHT SIDE: ICD-10-CM

## 2023-02-23 DIAGNOSIS — G44.86 CERVICOGENIC HEADACHE: ICD-10-CM

## 2023-02-23 RX ORDER — PREGABALIN 25 MG/1
25 CAPSULE ORAL 3 TIMES DAILY
Qty: 270 CAPSULE | Refills: 1 | Status: SHIPPED | OUTPATIENT
Start: 2023-02-23

## 2023-02-23 NOTE — TELEPHONE ENCOUNTER
[facility] would cost less money for me for medicine used on a recurring basis. I asked them  to send a request to switch the Pregabalin from [facility] to [facility]. Received an email this morning they havent received authorization to fill. Checking to see if you got their request.      As far as my health: I havent had a good nights sleep in weeks due to Nerve pain in back of head and right side of neck, and shoulder to right hand. The Amitriptyline at night made head pain seem worse, after 4 nights I stopped. Felt like it wasnt agreeing with one of the other 3 medications. 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 So frustrating. Taking my second injection of Emgality today. Still going to massage therapy twice a week and hoping to work with therapist on more exercises for building muscle back.       Script done on 1/24 but went to [facility] and the other is [place]

## 2023-03-20 DIAGNOSIS — M17.12 PRIMARY OSTEOARTHRITIS OF LEFT KNEE: Primary | ICD-10-CM

## 2023-04-05 RX ORDER — BUTALBITAL, ACETAMINOPHEN AND CAFFEINE 50; 325; 40 MG/1; MG/1; MG/1
TABLET ORAL
Qty: 50 TABLET | Refills: 5 | Status: SHIPPED
Start: 2023-04-05

## 2023-04-19 ENCOUNTER — OFFICE VISIT (OUTPATIENT)
Dept: ORTHOPEDIC SURGERY | Age: 78
End: 2023-04-19

## 2023-04-19 VITALS — WEIGHT: 108 LBS | HEIGHT: 65 IN | BODY MASS INDEX: 17.99 KG/M2

## 2023-04-19 DIAGNOSIS — M25.511 TRIGGER POINT OF RIGHT SHOULDER REGION: Primary | ICD-10-CM

## 2023-04-19 RX ORDER — TRIAMCINOLONE ACETONIDE 40 MG/ML
40 INJECTION, SUSPENSION INTRA-ARTICULAR; INTRAMUSCULAR ONCE
Status: COMPLETED | OUTPATIENT
Start: 2023-04-19 | End: 2023-04-19

## 2023-04-19 RX ORDER — BUPIVACAINE HYDROCHLORIDE 5 MG/ML
1 INJECTION, SOLUTION EPIDURAL; INTRACAUDAL ONCE
Status: COMPLETED | OUTPATIENT
Start: 2023-04-19 | End: 2023-04-19

## 2023-04-19 RX ADMIN — TRIAMCINOLONE ACETONIDE 40 MG: 40 INJECTION, SUSPENSION INTRA-ARTICULAR; INTRAMUSCULAR at 11:33

## 2023-04-19 RX ADMIN — BUPIVACAINE HYDROCHLORIDE 5 MG: 5 INJECTION, SOLUTION EPIDURAL; INTRACAUDAL at 11:33

## 2023-04-19 NOTE — PROGRESS NOTES
David Marion (: 1945) is a 69 y.o. female, established patient, here for evaluation of the following chief complaint(s):  Shoulder Pain       ASSESSMENT/PLAN:  Below is the assessment and plan developed based on review of pertinent history, physical exam, labs, studies, and medications. Findings were discussed with patient today. She elected to try repeat trigger point injection for the right scapula musculature. We discussed the risks and benefits of injection and informed consent was obtained. After a sterile preparation, 1 cc of bupivicaine and 40 mg of Kenalog were injected into the right parascapular region. The patient tolerated the procedure well and there were no complications. Post injection pain, blood sugar elevation, skin discoloration, fatty atrophy and the signs of infection were discussed in detail. The patient was instructed to contact us if there were any questions or concerns prior to their follow up appointment. 1. Trigger point of right shoulder region      Return if symptoms worsen or fail to improve. SUBJECTIVE/OBJECTIVE:  David Marion (: 1945) is a 69 y.o. female. She presents today with pain localizing to the parascapular region of the right shoulder. She has had previous trigger point in this area and there is recurrent flareup. She is scheduled to have knee replacement soon.         Allergies   Allergen Reactions    Cephalexin Other (comments)     Other reaction(s): Dermatological problems, e.g., rash, hives    Penicillins Rash     Other reaction(s): Dermatological problems, e.g., rash, hives  Other reaction(s): Dermatological problems, e.g., rash, hives      Aspirin Rash     Unable to take large dosages  Unable to take large dosages      Codeine Other (comments)     Rash, unable to take large dosages       Cymbalta [Duloxetine] Nausea and Vomiting    Penicillamine Rash    Penicillamine Rash    Prednisone Other (comments)     Insomnia, frequent and heart racing    Synthroid [Levothyroxine] Rash and Itching     To breast area    Levothyroxine Itching and Rash     To breast area       Current Outpatient Medications   Medication Sig    butalbital-acetaminophen-caffeine (FIORICET, ESGIC) -40 mg per tablet TAKE 2 TABLETS BY MOUTH EVERY 8 HOURS AS NEEDED FOR HEADACHE    pregabalin (LYRICA) 25 mg capsule Take 1 Capsule by mouth three (3) times daily. Max Daily Amount: 75 mg.    baclofen (LIORESAL) 10 mg tablet Take 0.5 Tablets by mouth three (3) times daily. galcanezumab-gnlm (Emgality Pen) 120 mg/mL injection 1 mL by SubCUTAneous route every thirty (30) days for 90 days. Indications: migraine prevention    vitamins A,C,E-zinc-copper (Ocuvite PreserVision) 2,148 mcg-113 mg-45 mg-17.4mg tab tablet Take 1 Tablet by mouth daily. cholecalciferol, vitamin D3, 50 mcg (2,000 unit) tab Take 1 Tablet by mouth daily. cyanocobalamin 1,000 mcg tablet Take 3 Tablets by mouth daily. multivitamin (ONE A DAY) tablet Take 1 Tablet by mouth daily. budesonide (ENTOCORT EC) 3 mg capsule TAKE 3 CAPSULES BY MOUTH DAILY    B.infantis-B.ani-B.long-B.bifi (Probiotic 4X) 10-15 mg TbEC Take  by mouth daily. thyroid, Pork, (ARMOUR) 90 mg tablet Take 1 Tablet by mouth daily. atorvastatin (LIPITOR) 10 mg tablet Take 1 Tablet by mouth every evening. diclofenac EC (VOLTAREN) 75 mg EC tablet 1 Tablet two (2) times a day. conjugated estrogens (PREMARIN) 0.625 mg tablet Take 1 Tablet by mouth three (3) days a week. 3 times weekly, no specific days    DULoxetine (CYMBALTA) 30 mg capsule Take 1 Capsule by mouth daily. Indications: anxiousness associated with depression, chronic muscle or bone pain, neuropathic pain (Patient not taking: Reported on 4/19/2023)     No current facility-administered medications for this visit.        Social History     Socioeconomic History    Marital status:      Spouse name: Not on file    Number of children: Not on file    Years of education: Not on file    Highest education level: Not on file   Occupational History    Not on file   Tobacco Use    Smoking status: Former    Smokeless tobacco: Never   Vaping Use    Vaping Use: Never used   Substance and Sexual Activity    Alcohol use: Not Currently     Alcohol/week: 0.0 standard drinks     Comment: very occasionally    Drug use: Never    Sexual activity: Yes     Partners: Male     Birth control/protection: None   Other Topics Concern    Not on file   Social History Narrative    Not on file     Social Determinants of Health     Financial Resource Strain: Not on file   Food Insecurity: Not on file   Transportation Needs: Not on file   Physical Activity: Not on file   Stress: Not on file   Social Connections: Not on file   Intimate Partner Violence: Not on file   Housing Stability: Not on file       Past Surgical History:   Procedure Laterality Date    COLONOSCOPY N/A 10/4/2021    COLONOSCOPY WITH BIOPSY performed by Reina Green MD at [facility] ENDOSCOPY    COLONOSCOPY,DIAGNOSTIC  10/4/2021         HX ANKLE FRACTURE [address]      HX APPENDECTOMY      HX COLONOSCOPY      HX GYN      hysterectomy at age 28    HX ORTHOPAEDIC Left 2017    rotator cuff repair       Family History   Problem Relation Age of Onset    Dementia Mother     Breast Cancer Mother     Other Mother         POLIO AND COLITIS    Cancer Mother         Breast cancer, alzheimers    COPD Father     Migraines Child     Allergic Rhinitis Child     Sleep Apnea Child     Anesth Problems Neg Hx         OB History    No obstetric history on file. REVIEW OF SYSTEMS:  ROS    Positive for: Musculoskeletal  Last edited by Uma Magana on 4/19/2023 10:40 AM.        Patient denies any recent fever, chills, nausea, vomiting, chest pain, or shortness of breath. Vitals:  Ht 5' 5\" (1.651 m)   Wt 108 lb (49 kg)   BMI 17.97 kg/m²    Body mass index is 17.97 kg/m². PHYSICAL EXAM:  General exam: Patient is awake, alert, and oriented x3. Well-appearing. No acute distress. Ambulates with a slightly antalgic gait. Heart/Lungs:  no respiratory distress, palpable pulses    Right shoulder: There is recurrent tenderness palpation of the posterior trigger point overlying supraspinatus muscle belly. Decreased range of motion of the shoulder. IMAGING:    XR Results (most recent):  Results from Appointment encounter on 08/11/22    XR SPINE CERV 4 OR 5 V    Narrative  AP, lateral, flexion-extension views of the cervical spine reveal significant spondylosis and disc degeneration, worse at C4-7, with possible autofusion at C4-5. Anterolisthesis at C3-4 in flexion, measuring approximately 3 mm, which reduces to approximately 1 mm in extension. No evidence of obvious acute fractures or lytic lesions. Results from Appointment encounter on 06/06/22    XR KNEE LT MAX 2 VWS    Narrative  X-rays of the left knee 4 views done today show evidence of bone-on-bone joint space narrowing and early osteophyte formation at the medial compartment. Small area of calcium deposition noted at the lateral joint line      Results from Appointment encounter on 01/07/22    XR SACRUM AND COCCYX    Narrative  X-rays 3 views of the sacrum done today show evidence of a nondisplaced fracture at the distal sacrum/coccyx. No orders of the defined types were placed in this encounter. An electronic signature was used to authenticate this note.   -- Clay Alatorre, DO

## 2023-04-21 ENCOUNTER — HOSPITAL ENCOUNTER (OUTPATIENT)
Dept: PREADMISSION TESTING | Age: 78
End: 2023-04-21
Payer: MEDICARE

## 2023-04-21 VITALS
WEIGHT: 102.95 LBS | SYSTOLIC BLOOD PRESSURE: 169 MMHG | DIASTOLIC BLOOD PRESSURE: 79 MMHG | HEART RATE: 74 BPM | TEMPERATURE: 97.6 F | RESPIRATION RATE: 18 BRPM | HEIGHT: 65 IN | BODY MASS INDEX: 17.15 KG/M2

## 2023-04-21 LAB
ABO + RH BLD: NORMAL
ANION GAP SERPL CALC-SCNC: 4 MMOL/L (ref 5–15)
APPEARANCE UR: CLEAR
BACTERIA URNS QL MICRO: ABNORMAL /HPF
BILIRUB UR QL: NEGATIVE
BLOOD GROUP ANTIBODIES SERPL: NORMAL
BUN SERPL-MCNC: 16 MG/DL (ref 6–20)
BUN/CREAT SERPL: 21 (ref 12–20)
CALCIUM SERPL-MCNC: 10 MG/DL (ref 8.5–10.1)
CHLORIDE SERPL-SCNC: 99 MMOL/L (ref 97–108)
CO2 SERPL-SCNC: 30 MMOL/L (ref 21–32)
COLOR UR: ABNORMAL
CREAT SERPL-MCNC: 0.78 MG/DL (ref 0.55–1.02)
EPITH CASTS URNS QL MICRO: ABNORMAL /LPF
ERYTHROCYTE [DISTWIDTH] IN BLOOD BY AUTOMATED COUNT: 13.2 % (ref 11.5–14.5)
EST. AVERAGE GLUCOSE BLD GHB EST-MCNC: 111 MG/DL
GLUCOSE SERPL-MCNC: 101 MG/DL (ref 65–100)
GLUCOSE UR STRIP.AUTO-MCNC: NEGATIVE MG/DL
HBA1C MFR BLD: 5.5 % (ref 4–5.6)
HCT VFR BLD AUTO: 37 % (ref 35–47)
HGB BLD-MCNC: 11.3 G/DL (ref 11.5–16)
HGB UR QL STRIP: NEGATIVE
HYALINE CASTS URNS QL MICRO: ABNORMAL /LPF (ref 0–5)
INR PPP: 1 (ref 0.9–1.1)
KETONES UR QL STRIP.AUTO: NEGATIVE MG/DL
LEUKOCYTE ESTERASE UR QL STRIP.AUTO: NEGATIVE
MCH RBC QN AUTO: 28.3 PG (ref 26–34)
MCHC RBC AUTO-ENTMCNC: 30.5 G/DL (ref 30–36.5)
MCV RBC AUTO: 92.5 FL (ref 80–99)
NITRITE UR QL STRIP.AUTO: NEGATIVE
NRBC # BLD: 0 K/UL (ref 0–0.01)
NRBC BLD-RTO: 0 PER 100 WBC
PH UR STRIP: 7.5 (ref 5–8)
PLATELET # BLD AUTO: 301 K/UL (ref 150–400)
PMV BLD AUTO: 8.9 FL (ref 8.9–12.9)
POTASSIUM SERPL-SCNC: 4.4 MMOL/L (ref 3.5–5.1)
PROT UR STRIP-MCNC: NEGATIVE MG/DL
PROTHROMBIN TIME: 10.3 SEC (ref 9–11.1)
RBC # BLD AUTO: 4 M/UL (ref 3.8–5.2)
RBC #/AREA URNS HPF: ABNORMAL /HPF (ref 0–5)
SODIUM SERPL-SCNC: 133 MMOL/L (ref 136–145)
SP GR UR REFRACTOMETRY: 1.01 (ref 1–1.03)
SPECIMEN EXP DATE BLD: NORMAL
UA: UC IF INDICATED,UAUC: ABNORMAL
UROBILINOGEN UR QL STRIP.AUTO: 0.2 EU/DL (ref 0.2–1)
WBC # BLD AUTO: 6.5 K/UL (ref 3.6–11)
WBC URNS QL MICRO: ABNORMAL /HPF (ref 0–4)

## 2023-04-21 PROCEDURE — 80048 BASIC METABOLIC PNL TOTAL CA: CPT

## 2023-04-21 PROCEDURE — 83036 HEMOGLOBIN GLYCOSYLATED A1C: CPT

## 2023-04-21 PROCEDURE — 81001 URINALYSIS AUTO W/SCOPE: CPT

## 2023-04-21 PROCEDURE — 85027 COMPLETE CBC AUTOMATED: CPT

## 2023-04-21 PROCEDURE — 85610 PROTHROMBIN TIME: CPT

## 2023-04-21 PROCEDURE — 36415 COLL VENOUS BLD VENIPUNCTURE: CPT

## 2023-04-21 PROCEDURE — 86900 BLOOD TYPING SEROLOGIC ABO: CPT

## 2023-04-21 RX ORDER — VIT C/E/ZN/COPPR/LUTEIN/ZEAXAN 250MG-90MG
1 CAPSULE ORAL DAILY
COMMUNITY

## 2023-04-21 NOTE — PERIOP NOTES
[address] INSTRUCTIONS  ORTHOPAEDIC    Surgery Date:   5/1/23    Your surgeon's office or [facility] staff will call you between 4 PM- 8 PM the day before surgery with your arrival time. If your surgery is on a Monday, you will receive a call the preceding Friday. Please report to [facility] Patient Access/Admitting on the 1st floor. Bring your insurance card, photo identification, and any copayment (if applicable). If you are going home the same day of your surgery, you must have a responsible adult to drive you home. You need to have a responsible adult to stay with you the first 24 hours after surgery and you should not drive a car for 24 hours following your surgery. Do NOT eat any solid foods after midnight the night before surgery including candy, mints or gum. You may drink clear liquids from midnight until 1 hour prior to arrival time. You may drink up to 12 ounces at one time every 4 hours. Do NOT drink alcohol or smoke 24 hours before surgery. STOP smoking for 14 days prior as it helps with breathing and healing after surgery. If you are being admitted to the hospital,please leave personal belongings/luggage in your car until you have an assigned hospital room number. Please wear comfortable clothes. Wear your glasses instead of contacts. We ask that all money, jewelry and valuables be left at home. Wear no make up, particularly mascara, the day of surgery. All body piercings, rings, and jewelry need to be removed and left at home. Please remove any nail polish or artificial nails from your fingernails. Please wear your hair loose or down. Please no pony-tails, buns, or any metal hair accessories. When you shower the morning of surgery, please do not apply any lotions or powders afterwards. You may wear deodorant. Do not shave any body area within 24 hours of your surgery. Please follow all instructions to avoid any potential surgical cancellation.   Should your physical condition change, (i.e. fever, cold, flu, etc.) please notify your surgeon as soon as possible. It is important to be on time. If a situation occurs where you may be delayed, please call:  [phone] / [phone] on the day of surgery. The Preadmission Testing staff can be reached at [phone]. Special instructions: [address] DAY OF SURGERY IF COMPLETED    Current Outpatient Medications   Medication Sig    vit C,V-Pv-ivmnb-lutein-zeaxan (PreserVision AREDS-2) 250-90-40-1 mg cap capsule Take 1 Capsule by mouth daily. L.acidoph,paracasei, B.lactis (ENVIVE PO) Take 1 Tablet by mouth daily. butalbital-acetaminophen-caffeine (FIORICET, ESGIC) -40 mg per tablet TAKE 2 TABLETS BY MOUTH EVERY 8 HOURS AS NEEDED FOR HEADACHE    pregabalin (LYRICA) 25 mg capsule Take 1 Capsule by mouth three (3) times daily. Max Daily Amount: 75 mg.    baclofen (LIORESAL) 10 mg tablet Take 0.5 Tablets by mouth three (3) times daily. galcanezumab-gnlm (Emgality Pen) 120 mg/mL injection 1 mL by SubCUTAneous route every thirty (30) days for 90 days. Indications: migraine prevention    cholecalciferol, vitamin D3, 50 mcg (2,000 unit) tab Take 1 Tablet by mouth daily. cyanocobalamin 1,000 mcg tablet Take 3 Tablets by mouth daily. multivitamin (ONE A DAY) tablet Take 1 Tablet by mouth daily. budesonide (ENTOCORT EC) 3 mg capsule TAKE 3 CAPSULES BY MOUTH DAILY    B.infantis-B.ani-B.long-B.bifi (Probiotic 4X) 10-15 mg TbEC Take  by mouth daily. thyroid, Pork, (ARMOUR) 90 mg tablet Take 1 Tablet by mouth daily. Monday THRU Friday ONLY    atorvastatin (LIPITOR) 10 mg tablet Take 1 Tablet by mouth every evening. diclofenac EC (VOLTAREN) 75 mg EC tablet 1 Tablet two (2) times a day. conjugated estrogens (PREMARIN) 0.625 mg tablet Take 1 Tablet by mouth three (3) days a week.  3 times weekly, no specific days     No current facility-administered medications for this encounter. YOU MUST ONLY TAKE THESE MEDICATIONS THE MORNING OF SURGERY WITH A SIP OF WATER: LYRICA, BUDESONIDE, BACLOFEN, ARMOUR THYROID  MEDICATIONS TO TAKE THE MORNING OF SURGERY ONLY IF NEEDED: NONE  HOLD these prescription medications BEFORE Surgery: NONE  Ask your surgeon/prescribing physician about when/if to STOP taking these medications: PREMARIN  Stop any non-steroidal anti-inflammatory drugs (i.e. Ibuprofen, Naproxen, Advil, Aleve) 3 days before surgery. You may take Tylenol. STOP all vitamins and herbal supplements 1 week prior to  surgery. If you are currently taking Plavix, Coumadin, or any other blood-thinning/anticoagulant medication contact your prescribing physician for instructions. Preventing Infections Before and After - Your Surgery    IMPORTANT INSTRUCTIONS    You play an important role in your health and preparation for surgery. To reduce the germs on your skin you will need to shower with CHG soap (Chorhexidine gluconate 4%) two times before surgery. CHG soap (Hibiclens, Hex-A-Clens or store brand)  CHG soap will be provided at your Preadmission Testing (PAT) appointment. If you do not have a PAT appointment before surgery, you may arrange to  CHG soap from our office or purchase CHG soap at a pharmacy, grocery or department store. You need to purchase TWO 4 ounce bottles to use for your 2 showers. Steps to follow:  [place] your hair with your normal shampoo and your body with regular soap and rinse well to remove shampoo and soap from your skin. Wet a clean washcloth and turn off the shower. Put CHG soap on washcloth and apply to your entire body from the neck down. Do not use on your head, face or private parts(genitals). Do not use CHG soap on open sores, wounds or areas of skin irritation. Wash you body gently for 5 minutes. Do not wash your skin too hard. This soap does not create lather.  Pay special attention to your underarms and from your belly button to your feet.  Turn the shower back on and rinse well to get CHG soap off your body. Pat your skin dry with a clean, dry towel. Do not apply lotions or moisturizer. Put on clean clothes and sleep on fresh bed sheets and do not allow pets to sleep with you. Shower with CHG soap 2 times before your surgery  The evening before your surgery  The morning of your surgery      Tips to help prevent infections after your surgery:  Protect your surgical wound from germs:  Hand washing is the most important thing you and your caregivers can do to prevent infections. Keep your bandage clean and dry! Do not touch your surgical wound. Use clean, freshly washed towels and washcloths every time you shower; do not share bath linens with others. Until your surgical wound is healed, wear clothing and sleep on bed linens each day that are clean. Do not allow pets to sleep in your bed with you or touch your surgical wound. Do not smoke - smoking delays wound healing. This may be a good time to stop smoking. If you have diabetes, it is important for you to manage your blood sugar levels properly before your surgery as well as after your surgery. Poorly managed blood sugar levels slow down wound healing and prevent you from healing completely. Prevention of Infection  Testing for Staphylococcus aureus on your skin before surgery    Staphylococcus aureus (staph) is a common bacteria that is found on the body. It normally does not cause infection on healthy skin. Before surgery, you will be tested to see if you have staph by swabbing the inside of your nose. When you have an incision with surgery, the goal is to protect that incision from infection. Removal of the staph bacteria before surgery can decrease the risk of a surgical site infection. If your nose swab is positive for staph you will be called.  Your treatment will include 2 steps:  Prescription for Mupirocin ointment to be used in each nostril twice a day for 5 days.  Showering with Chlorhexidine (CHG) liquid soap for 5 days prior to surgery. How to use Mupirocin ointment in your nose   the prescription from your pharmacy. You will receive a large tube of ointment which will be big enough for all of your treatments. You will apply this ointment to each nostril 2 times a day for 5 days. Wash your hands with  gel or soap and water for 20 seconds before using ointment. Place a pea-sized amount of ointment on a cotton Q-tip. Apply ointment just inside of each nostril with the Q-tip. Do not push Q-tip or ointment deep inside you nose. Press your nostrils together and massage for a few seconds. Wash your hands with  gel or soap and water after you are finished. Do not get ointment near your eyes. If it gets into your eyes, rinse them with cool water. If you need to use nasal spray, clean the tip of the bottle with alcohol before use and do not use both at the same time. If you are scheduled for COVID testing during the 5 days, do NOT apply morning dose until after the COVID test has been performed. How to use Chlorhexidine (CHG) 4% liquid soap  Purchase an 8 ounce bottle of CHG liquid soap (Chlorhexidine 4%, Hibiclens, Hex-A-Clens or store brand) at a pharmacy or grocery store. Wash your hair with your normal shampoo and your body with regular soap and rinse well to remove shampoo and soap from your skin. Wet a clean washcloth and turn off the shower. Put CHG soap on washcloth and apply to your entire body from the neck down. Do not use on your head, face or private parts(genitals). Do not use CHG soap on open sores, wounds or areas of skin irritation. Wash your body gently for 5 minutes. Do not wash your skin too hard. This soap does not create lather. Pay special attention to your underarms and from your belly button to your feet. Turn the shower back on and rinse well to get CHG soap off your body.   Pat your skin dry with a clean, dry towel. Do not apply lotions or moisturizer. Put on clean clothes and sleep on fresh bed sheets the night before surgery. Do not allow pets to sleep with you. Eating and Drinking Before Surgery    You may eat a regular dinner at the usual time on the day before your surgery. Do NOT eat any solid foods after midnight. You may drink clear liquids only from 12 midnight until 1 hours prior to your arrival time at the hospital on the day of your surgery. Do NOT drink alcohol. Clear liquids include:  Water  Fruit juices without pulp( i.e. apple juice)  Carbonated beverages  Black coffee (no cream/milk)  Tea (no cream/milk)  Gatorade  You may drink up to 12-16 ounces at one time every 4 hours between the hours of midnight and 1 hour before your arrival time at the hospital. Example- if your arrival time at the hospital is 6am, you may drink 12-16 ounces of clear liquids no later than 5am.  If you have any questions, please contact your surgeon's office. Patient Information Regarding COVID Restrictions    Day of Procedure    Please park in the parking deck or any designated visitor parking lot. Enter the facility through the Main Entrance of the hospital.  On the day of surgery, please provide the cell phone number of the person who will be waiting for you to the Patient Access representative at the time of registration. Masks are highly recommended in the hospital, but not required. Once your procedure and the immediate recovery period is completed, a nurse in the recovery area will contact your designated visitor to inform them of your room number or to otherwise review other pertinent information regarding your care. Social distancing practices are strongly encouraged in waiting areas and the cafeteria. The patient was contacted in person. She verbalized understanding of all instructions does not  need reinforcement.

## 2023-04-22 LAB
BACTERIA SPEC CULT: NORMAL
BACTERIA SPEC CULT: NORMAL
SERVICE CMNT-IMP: NORMAL

## 2023-04-24 NOTE — PERIOP NOTES
PAT Nurse Practitioner   Pre-Operative Chart Review/Assessment:-ORTHOPEDIC                Patient Name:  Stephanie Mckeon                                                           Age:   69 y.o.    :  1945     Today's Date:  2023     Date of PAT:   23      Date of Surgery:    23      Procedure(s):  Left Unicondylar Knee Arthroplasty     Surgeon:   Dr. Geoff Morris                       PLAN:      1)  Medical Clearance/PCP:  Lucy Murillo MD      2)  Cardiac Clearance:  EKG and METs reviewed. No further cardiac evaluation requested. EKG from 23 showed SB; HR-54 w/ LAD. 3)  Diabetic Treatment Consult:  Not indicated. A1c-5.5      4)  Sleep Apnea evaluation:   Not indicated.  CHRISTOPHER Score 2.       5) Treatment for MRSA/Staph Aureus:  Neg      6) Additional Concerns:  Former smoker, HTN                Vital Signs:         Vitals:    23 1117   BP: (!) 169/79   Pulse: 74   Resp: 18   Temp: 97.6 °F (36.4 °C)   Weight: 46.7 kg (102 lb 15.3 oz)   Height: 5' 5\" (1.651 m)          Body mass index is 17.13 kg/m².         ____________________________________________  PAST MEDICAL HISTORY  Past Medical History:   Diagnosis Date    Arthritis     Cancer ([facility] [address]     Skin    Chronic pain     LEFT KNEE AND BACK, NECK AND HEAD    Colitis     Degenerative cervical spinal stenosis     Degenerative lumbar spinal stenosis     Hyperlipemia     Hypertension     Migraine     Occipital neuralgia of right side     Thyroid disease     Ulceration of vulva 2008    Wound of left leg 2017      ____________________________________________  PAST SURGICAL HISTORY  Past Surgical History:   Procedure Laterality Date    COLONOSCOPY N/A 10/04/2021    COLONOSCOPY WITH BIOPSY performed by Safia Davila MD at [facility] ENDOSCOPY    COLONOSCOPY,DIAGNOSTIC  10/04/2021         HX ANKLE FRACTURE TX Left     HX APPENDECTOMY      HX COLONOSCOPY      HX GYN      hysterectomy at age 28    HX ORTHOPAEDIC Left 2017    rotator cuff repair    HX WISDOM TEETH EXTRACTION        ____________________________________________  HOME MEDICATIONS  Current Outpatient Medications   Medication Sig    vit C,T-Nv-qybkz-lutein-zeaxan (PreserVision AREDS-2) 250-90-40-1 mg cap capsule Take 1 Capsule by mouth daily. L.acidoph,paracasei, B.lactis (ENVIVE PO) Take 1 Tablet by mouth daily. butalbital-acetaminophen-caffeine (FIORICET, ESGIC) -40 mg per tablet TAKE 2 TABLETS BY MOUTH EVERY 8 HOURS AS NEEDED FOR HEADACHE    pregabalin (LYRICA) 25 mg capsule Take 1 Capsule by mouth three (3) times daily. Max Daily Amount: 75 mg.    baclofen (LIORESAL) 10 mg tablet Take 0.5 Tablets by mouth three (3) times daily. galcanezumab-gnlm (Emgality Pen) 120 mg/mL injection 1 mL by SubCUTAneous route every thirty (30) days for 90 days. Indications: migraine prevention    cholecalciferol, vitamin D3, 50 mcg (2,000 unit) tab Take 1 Tablet by mouth daily. cyanocobalamin 1,000 mcg tablet Take 3 Tablets by mouth daily. multivitamin (ONE A DAY) tablet Take 1 Tablet by mouth daily. budesonide (ENTOCORT EC) 3 mg capsule TAKE 3 CAPSULES BY MOUTH DAILY    B.infantis-B.ani-B.long-B.bifi (Probiotic 4X) 10-15 mg TbEC Take  by mouth daily. thyroid, Pork, (ARMOUR) 90 mg tablet Take 1 Tablet by mouth daily. Monday THRU Friday ONLY    atorvastatin (LIPITOR) 10 mg tablet Take 1 Tablet by mouth every evening. diclofenac EC (VOLTAREN) 75 mg EC tablet 1 Tablet two (2) times a day. conjugated estrogens (PREMARIN) 0.625 mg tablet Take 1 Tablet by mouth three (3) days a week.  3 times weekly, no specific days     No current facility-administered medications for this encounter.      ____________________________________________  ALLERGIES  Allergies   Allergen Reactions    Cephalexin Other (comments)     Other reaction(s): Dermatological problems, e.g., rash, hives    Penicillins Rash     Other reaction(s): Dermatological problems, e.g., rash, hives  Other reaction(s): Dermatological problems, e.g., rash, hives  Patient screened for any delayed non-IgE-mediated reaction to PCN.         Patient notes the following:    No delayed non-IgE-mediated reaction to PCN             Aspirin Rash and Swelling     Unable to take large dosages  Unable to take large dosages      Codeine Other (comments)     Rash, unable to take large dosages       Cymbalta [Duloxetine] Nausea and Vomiting    Methylprednisolone Other (comments)     INSOMNIA, FREQUENT HEART RACING      Penicillamine Rash    Penicillamine Rash    Prednisone Other (comments)     Insomnia, frequent and heart racing    Synthroid [Levothyroxine] Rash and Itching     To breast area    Levothyroxine Itching and Rash     To breast area      ____________________________________________  SOCIAL HISTORY  Social History     Tobacco Use    Smoking status: Former     Packs/day: 1.00     Years: 5.00     Pack years: 5.00     Types: Cigarettes     Quit date:      Years since quittin.3    Smokeless tobacco: Never   Substance Use Topics    Alcohol use: Not Currently     Alcohol/week: 0.0 standard drinks     Comment: very occasionally      ____________________________________________   Internal Administration   First Dose      Second Dose         Last COVID Lab SARS-CoV-2 ( )   Date Value   2021 Not detected                    Labs:     Hospital Outpatient Visit on 2023   Component Date Value Ref Range Status    Sodium 2023 133 (L)  136 - 145 mmol/L Final    Potassium 2023 4.4  3.5 - 5.1 mmol/L Final    Chloride 2023 99  97 - 108 mmol/L Final    CO2 2023 30  21 - 32 mmol/L Final    Anion gap 2023 4 (L)  5 - 15 mmol/L Final    Glucose 2023 101 (H)  65 - 100 mg/dL Final    BUN 2023 16  6 - 20 MG/DL Final    Creatinine 2023 0.78  0.55 - 1.02 MG/DL Final    BUN/Creatinine ratio 2023 21 (H)  12 - 20   Final    eGFR 2023 >60  >60 ml/min/1.73m2 Final    Comment:      Pediatric calculator link: Lorin.at. org/professionals/kdoqi/gfr_calculatorped       These results are not intended for use in patients <25years of age. eGFR results are calculated without a race factor using  the 2021 CKD-EPI equation. Careful clinical correlation is recommended, particularly when comparing to results calculated using previous equations. The CKD-EPI equation is less accurate in patients with extremes of muscle mass, extra-renal metabolism of creatinine, excessive creatine ingestion, or following therapy that affects renal tubular secretion. Calcium 04/21/2023 10.0  8.5 - 10.1 MG/DL Final    WBC 04/21/2023 6.5  3.6 - 11.0 K/uL Final    RBC 04/21/2023 4.00  3.80 - 5.20 M/uL Final    HGB 04/21/2023 11.3 (L)  11.5 - 16.0 g/dL Final    HCT 04/21/2023 37.0  35.0 - 47.0 % Final    MCV 04/21/2023 92.5  80.0 - 99.0 FL Final    MCH 04/21/2023 28.3  26.0 - 34.0 PG Final    MCHC 04/21/2023 30.5  30.0 - 36.5 g/dL Final    RDW 04/21/2023 13.2  11.5 - 14.5 % Final    PLATELET 20/29/2059 626  150 - 400 K/uL Final    MPV 04/21/2023 8.9  8.9 - 12.9 FL Final    NRBC 04/21/2023 0.0  0  WBC Final    ABSOLUTE NRBC 04/21/2023 0.00  0.00 - 0.01 K/uL Final    Crossmatch Expiration 04/21/2023 05/04/2023,2359   Final    ABO/Rh(D) 04/21/2023 O POSITIVE   Final    Antibody screen 04/21/2023 NEG   Final    INR 04/21/2023 1.0  0.9 - 1.1   Final    A single therapeutic range for Vit K antagonists may not be optimal for all indications - see June, 2008 issue of Chest, American College of Chest Physicians Evidence-Based Clinical Practice Guidelines, 8th Edition.     Prothrombin time 04/21/2023 10.3  9.0 - 11.1 sec Final    Color 04/21/2023 YELLOW/STRAW    Final    Color Reference Range: Straw, Yellow or Dark Yellow    Appearance 04/21/2023 CLEAR  CLEAR   Final    Specific gravity 04/21/2023 1.006  1.003 - 1.030   Final    pH (UA) 04/21/2023 7.5  5.0 - 8.0   Final    Protein 04/21/2023 Negative  NEG mg/dL Final Glucose 04/21/2023 Negative  NEG mg/dL Final    Ketone 04/21/2023 Negative  NEG mg/dL Final    Bilirubin 04/21/2023 Negative  NEG   Final    Blood 04/21/2023 Negative  NEG   Final    Urobilinogen 04/21/2023 0.2  0.2 - 1.0 EU/dL Final    Nitrites 04/21/2023 Negative  NEG   Final    Leukocyte Esterase 04/21/2023 Negative  NEG   Final    UA:UC IF INDICATED 04/21/2023 CULTURE NOT INDICATED BY UA RESULT  CNI   Final    WBC 04/21/2023 0-4  0 - 4 /hpf Final    RBC 04/21/2023 0-5  0 - 5 /hpf Final    Epithelial cells 04/21/2023 FEW  FEW /lpf Final    Epithelial cell category consists of squamous cells and /or transitional urothelial cells. Renal tubular cells, if present, are separately identified as such. Bacteria 04/21/2023 1+ (A)  NEG /hpf Final    Hyaline cast 04/21/2023 0-2  0 - 5 /lpf Final    Hemoglobin A1c 04/21/2023 5.5  4.0 - 5.6 % Final    Comment: NEW METHOD  PLEASE NOTE NEW REFERENCE RANGE  (NOTE)  HbA1C Interpretive Ranges  <5.7              Normal  5.7 - 6.4         Consider Prediabetes  >6.5              Consider Diabetes      Est. average glucose 04/21/2023 111  mg/dL Final    Special Requests: 04/21/2023 NO SPECIAL REQUESTS    Final    Culture result: 04/21/2023 MRSA NOT PRESENT    Final    Culture result: 04/21/2023     Final                    Value:Screening of patient nares for MRSA is for surveillance purposes and, if positive, to facilitate isolation considerations in high risk settings. It is not intended for automatic decolonization interventions per se as regimens are not sufficiently effective to warrant routine use. Skin:     Denies open wounds, cuts, sores, rashes or other areas of concern in PAT assessment.           Sana Beaulieu NP  Available via [facility]

## 2023-05-01 ENCOUNTER — ANESTHESIA EVENT (OUTPATIENT)
Dept: SURGERY | Age: 78
End: 2023-05-01
Payer: MEDICARE

## 2023-05-01 ENCOUNTER — HOSPITAL ENCOUNTER (OUTPATIENT)
Age: 78
LOS: 1 days | Discharge: HOME HEALTH | End: 2023-05-02
Attending: ORTHOPAEDIC SURGERY | Admitting: ORTHOPAEDIC SURGERY
Payer: MEDICARE

## 2023-05-01 ENCOUNTER — ANESTHESIA (OUTPATIENT)
Dept: SURGERY | Age: 78
End: 2023-05-01
Payer: MEDICARE

## 2023-05-01 DIAGNOSIS — Z96.652 S/P TOTAL KNEE ARTHROPLASTY, LEFT: ICD-10-CM

## 2023-05-01 DIAGNOSIS — M17.12 LOCALIZED OSTEOARTHRITIS OF LEFT KNEE: Primary | ICD-10-CM

## 2023-05-01 LAB
GLUCOSE BLD STRIP.AUTO-MCNC: 105 MG/DL (ref 65–117)
SERVICE CMNT-IMP: NORMAL

## 2023-05-01 PROCEDURE — 77030035236 HC SUT PDS STRATFX BARB J&J -B: Performed by: ORTHOPAEDIC SURGERY

## 2023-05-01 PROCEDURE — 74011000258 HC RX REV CODE- 258: Performed by: ORTHOPAEDIC SURGERY

## 2023-05-01 PROCEDURE — 76060000034 HC ANESTHESIA 1.5 TO 2 HR: Performed by: ORTHOPAEDIC SURGERY

## 2023-05-01 PROCEDURE — 97116 GAIT TRAINING THERAPY: CPT

## 2023-05-01 PROCEDURE — 74011000250 HC RX REV CODE- 250: Performed by: PHYSICIAN ASSISTANT

## 2023-05-01 PROCEDURE — C1776 JOINT DEVICE (IMPLANTABLE): HCPCS | Performed by: ORTHOPAEDIC SURGERY

## 2023-05-01 PROCEDURE — 74011250637 HC RX REV CODE- 250/637: Performed by: PHYSICIAN ASSISTANT

## 2023-05-01 PROCEDURE — 77030014125 HC TY EPDRL BBMI -B: Performed by: ANESTHESIOLOGY

## 2023-05-01 PROCEDURE — 20985 CPTR-ASST DIR MS PX: CPT | Performed by: ORTHOPAEDIC SURGERY

## 2023-05-01 PROCEDURE — 77030002933 HC SUT MCRYL J&J -A: Performed by: ORTHOPAEDIC SURGERY

## 2023-05-01 PROCEDURE — 76210000016 HC OR PH I REC 1 TO 1.5 HR: Performed by: ORTHOPAEDIC SURGERY

## 2023-05-01 PROCEDURE — 77030006812 HC BLD SAW RECIP STRY -B: Performed by: ORTHOPAEDIC SURGERY

## 2023-05-01 PROCEDURE — C1713 ANCHOR/SCREW BN/BN,TIS/BN: HCPCS | Performed by: ORTHOPAEDIC SURGERY

## 2023-05-01 PROCEDURE — 74011000258 HC RX REV CODE- 258: Performed by: PHYSICIAN ASSISTANT

## 2023-05-01 PROCEDURE — 82962 GLUCOSE BLOOD TEST: CPT

## 2023-05-01 PROCEDURE — 77030031139 HC SUT VCRL2 J&J -A: Performed by: ORTHOPAEDIC SURGERY

## 2023-05-01 PROCEDURE — 74011000250 HC RX REV CODE- 250: Performed by: ORTHOPAEDIC SURGERY

## 2023-05-01 PROCEDURE — 97530 THERAPEUTIC ACTIVITIES: CPT

## 2023-05-01 PROCEDURE — 77030010507 HC ADH SKN DERMBND J&J -B: Performed by: ORTHOPAEDIC SURGERY

## 2023-05-01 PROCEDURE — 74011250636 HC RX REV CODE- 250/636: Performed by: NURSE ANESTHETIST, CERTIFIED REGISTERED

## 2023-05-01 PROCEDURE — 76010000162 HC OR TIME 1.5 TO 2 HR INTENSV-TIER 1: Performed by: ORTHOPAEDIC SURGERY

## 2023-05-01 PROCEDURE — 74011250636 HC RX REV CODE- 250/636: Performed by: PHYSICIAN ASSISTANT

## 2023-05-01 PROCEDURE — 27446 REVISION OF KNEE JOINT: CPT | Performed by: ORTHOPAEDIC SURGERY

## 2023-05-01 PROCEDURE — 74011250636 HC RX REV CODE- 250/636: Performed by: ORTHOPAEDIC SURGERY

## 2023-05-01 PROCEDURE — C9290 INJ, BUPIVACAINE LIPOSOME: HCPCS | Performed by: ORTHOPAEDIC SURGERY

## 2023-05-01 PROCEDURE — 74011250636 HC RX REV CODE- 250/636: Performed by: ANESTHESIOLOGY

## 2023-05-01 PROCEDURE — 77030012935 HC DRSG AQUACEL BMS -B: Performed by: ORTHOPAEDIC SURGERY

## 2023-05-01 PROCEDURE — 2709999900 HC NON-CHARGEABLE SUPPLY: Performed by: ORTHOPAEDIC SURGERY

## 2023-05-01 PROCEDURE — 97161 PT EVAL LOW COMPLEX 20 MIN: CPT

## 2023-05-01 DEVICE — JOURNEY II UNI  MEDIAL TIBIA                                    BASEPLATE SZ 3 LEFT
Type: IMPLANTABLE DEVICE | Site: KNEE | Status: FUNCTIONAL
Brand: JOURNEY II UNI

## 2023-05-01 DEVICE — JOURNEY II UNI XLPE TIBIA INSERT                                    MEDIAL SZ 3-4 9MM
Type: IMPLANTABLE DEVICE | Site: KNEE | Status: FUNCTIONAL
Brand: JOURNEY II UNI

## 2023-05-01 DEVICE — SMARTSET GHV GENTAMICIN HIGH VISCOSITY BONE CEMENT 40G
Type: IMPLANTABLE DEVICE | Site: KNEE | Status: FUNCTIONAL
Brand: SMARTSET

## 2023-05-01 DEVICE — JOURNEY II UNI OXINIUM FEMORAL                                    COMPONENT 2 LM/RL
Type: IMPLANTABLE DEVICE | Site: KNEE | Status: FUNCTIONAL
Brand: JOURNEY II UNI

## 2023-05-01 DEVICE — KNEE K2 TOT HEMI ADV CMTLS IMPL CAPPED K2 SN: Type: IMPLANTABLE DEVICE | Status: FUNCTIONAL

## 2023-05-01 RX ORDER — DEXAMETHASONE SODIUM PHOSPHATE 10 MG/ML
10 INJECTION INTRAMUSCULAR; INTRAVENOUS ONCE
Status: DISCONTINUED | OUTPATIENT
Start: 2023-05-01 | End: 2023-05-01 | Stop reason: HOSPADM

## 2023-05-01 RX ORDER — SODIUM CHLORIDE, SODIUM LACTATE, POTASSIUM CHLORIDE, CALCIUM CHLORIDE 600; 310; 30; 20 MG/100ML; MG/100ML; MG/100ML; MG/100ML
125 INJECTION, SOLUTION INTRAVENOUS CONTINUOUS
Status: DISCONTINUED | OUTPATIENT
Start: 2023-05-01 | End: 2023-05-01 | Stop reason: HOSPADM

## 2023-05-01 RX ORDER — SODIUM CHLORIDE 9 MG/ML
125 INJECTION, SOLUTION INTRAVENOUS CONTINUOUS
Status: DISPENSED | OUTPATIENT
Start: 2023-05-01 | End: 2023-05-02

## 2023-05-01 RX ORDER — ATORVASTATIN CALCIUM 10 MG/1
10 TABLET, FILM COATED ORAL EVERY EVENING
Status: DISCONTINUED | OUTPATIENT
Start: 2023-05-01 | End: 2023-05-02 | Stop reason: HOSPADM

## 2023-05-01 RX ORDER — FENTANYL CITRATE 50 UG/ML
25 INJECTION, SOLUTION INTRAMUSCULAR; INTRAVENOUS
Status: DISCONTINUED | OUTPATIENT
Start: 2023-05-01 | End: 2023-05-01 | Stop reason: HOSPADM

## 2023-05-01 RX ORDER — MIDAZOLAM HYDROCHLORIDE 1 MG/ML
1 INJECTION, SOLUTION INTRAMUSCULAR; INTRAVENOUS AS NEEDED
Status: DISCONTINUED | OUTPATIENT
Start: 2023-05-01 | End: 2023-05-01 | Stop reason: HOSPADM

## 2023-05-01 RX ORDER — NALOXONE HYDROCHLORIDE 0.4 MG/ML
0.4 INJECTION, SOLUTION INTRAMUSCULAR; INTRAVENOUS; SUBCUTANEOUS AS NEEDED
Status: DISCONTINUED | OUTPATIENT
Start: 2023-05-01 | End: 2023-05-02 | Stop reason: HOSPADM

## 2023-05-01 RX ORDER — POLYETHYLENE GLYCOL 3350 17 G/17G
17 POWDER, FOR SOLUTION ORAL DAILY
Status: DISCONTINUED | OUTPATIENT
Start: 2023-05-01 | End: 2023-05-02 | Stop reason: HOSPADM

## 2023-05-01 RX ORDER — SODIUM CHLORIDE 0.9 % (FLUSH) 0.9 %
5-40 SYRINGE (ML) INJECTION EVERY 8 HOURS
Status: DISCONTINUED | OUTPATIENT
Start: 2023-05-01 | End: 2023-05-01 | Stop reason: HOSPADM

## 2023-05-01 RX ORDER — ASPIRIN 81 MG/1
81 TABLET ORAL 2 TIMES DAILY
Status: DISCONTINUED | OUTPATIENT
Start: 2023-05-01 | End: 2023-05-02 | Stop reason: HOSPADM

## 2023-05-01 RX ORDER — ONDANSETRON 2 MG/ML
4 INJECTION INTRAMUSCULAR; INTRAVENOUS AS NEEDED
Status: DISCONTINUED | OUTPATIENT
Start: 2023-05-01 | End: 2023-05-01 | Stop reason: HOSPADM

## 2023-05-01 RX ORDER — SODIUM CHLORIDE 0.9 % (FLUSH) 0.9 %
5-40 SYRINGE (ML) INJECTION EVERY 8 HOURS
Status: DISCONTINUED | OUTPATIENT
Start: 2023-05-01 | End: 2023-05-02 | Stop reason: HOSPADM

## 2023-05-01 RX ORDER — SODIUM CHLORIDE 0.9 % (FLUSH) 0.9 %
5-40 SYRINGE (ML) INJECTION AS NEEDED
Status: DISCONTINUED | OUTPATIENT
Start: 2023-05-01 | End: 2023-05-02 | Stop reason: HOSPADM

## 2023-05-01 RX ORDER — SODIUM CHLORIDE 0.9 % (FLUSH) 0.9 %
5-40 SYRINGE (ML) INJECTION AS NEEDED
Status: DISCONTINUED | OUTPATIENT
Start: 2023-05-01 | End: 2023-05-01 | Stop reason: HOSPADM

## 2023-05-01 RX ORDER — LIDOCAINE HYDROCHLORIDE 10 MG/ML
0.5 INJECTION, SOLUTION EPIDURAL; INFILTRATION; INTRACAUDAL; PERINEURAL AS NEEDED
Status: DISCONTINUED | OUTPATIENT
Start: 2023-05-01 | End: 2023-05-01 | Stop reason: HOSPADM

## 2023-05-01 RX ORDER — ROPIVACAINE HYDROCHLORIDE 5 MG/ML
30 INJECTION, SOLUTION EPIDURAL; INFILTRATION; PERINEURAL AS NEEDED
Status: DISCONTINUED | OUTPATIENT
Start: 2023-05-01 | End: 2023-05-01 | Stop reason: HOSPADM

## 2023-05-01 RX ORDER — ACETAMINOPHEN 325 MG/1
650 TABLET ORAL ONCE
Status: DISCONTINUED | OUTPATIENT
Start: 2023-05-01 | End: 2023-05-01 | Stop reason: SDUPTHER

## 2023-05-01 RX ORDER — MORPHINE SULFATE 2 MG/ML
2 INJECTION, SOLUTION INTRAMUSCULAR; INTRAVENOUS
Status: DISCONTINUED | OUTPATIENT
Start: 2023-05-01 | End: 2023-05-01 | Stop reason: HOSPADM

## 2023-05-01 RX ORDER — FENTANYL CITRATE 50 UG/ML
50 INJECTION, SOLUTION INTRAMUSCULAR; INTRAVENOUS AS NEEDED
Status: DISCONTINUED | OUTPATIENT
Start: 2023-05-01 | End: 2023-05-01 | Stop reason: HOSPADM

## 2023-05-01 RX ORDER — FACIAL-BODY WIPES
10 EACH TOPICAL DAILY PRN
Status: DISCONTINUED | OUTPATIENT
Start: 2023-05-01 | End: 2023-05-02 | Stop reason: HOSPADM

## 2023-05-01 RX ORDER — PREGABALIN 75 MG/1
75 CAPSULE ORAL ONCE
Status: DISCONTINUED | OUTPATIENT
Start: 2023-05-01 | End: 2023-05-01 | Stop reason: HOSPADM

## 2023-05-01 RX ORDER — HYDROXYZINE HYDROCHLORIDE 10 MG/1
10 TABLET, FILM COATED ORAL
Status: DISCONTINUED | OUTPATIENT
Start: 2023-05-01 | End: 2023-05-02 | Stop reason: HOSPADM

## 2023-05-01 RX ORDER — PREGABALIN 25 MG/1
25 CAPSULE ORAL 3 TIMES DAILY
Status: DISCONTINUED | OUTPATIENT
Start: 2023-05-01 | End: 2023-05-02 | Stop reason: HOSPADM

## 2023-05-01 RX ORDER — PROPOFOL 10 MG/ML
INJECTION, EMULSION INTRAVENOUS
Status: DISCONTINUED | OUTPATIENT
Start: 2023-05-01 | End: 2023-05-01 | Stop reason: HOSPADM

## 2023-05-01 RX ORDER — ACETAMINOPHEN 500 MG
1000 TABLET ORAL ONCE
Status: COMPLETED | OUTPATIENT
Start: 2023-05-01 | End: 2023-05-01

## 2023-05-01 RX ORDER — AMOXICILLIN 250 MG
1 CAPSULE ORAL 2 TIMES DAILY
Status: DISCONTINUED | OUTPATIENT
Start: 2023-05-01 | End: 2023-05-02 | Stop reason: HOSPADM

## 2023-05-01 RX ORDER — CELECOXIB 200 MG/1
200 CAPSULE ORAL ONCE
Status: COMPLETED | OUTPATIENT
Start: 2023-05-01 | End: 2023-05-01

## 2023-05-01 RX ORDER — SODIUM CHLORIDE 9 MG/ML
INJECTION, SOLUTION INTRAVENOUS
Status: DISCONTINUED | OUTPATIENT
Start: 2023-05-01 | End: 2023-05-01 | Stop reason: HOSPADM

## 2023-05-01 RX ORDER — KETOROLAC TROMETHAMINE 30 MG/ML
15 INJECTION, SOLUTION INTRAMUSCULAR; INTRAVENOUS EVERY 6 HOURS
Status: DISCONTINUED | OUTPATIENT
Start: 2023-05-01 | End: 2023-05-02 | Stop reason: HOSPADM

## 2023-05-01 RX ORDER — DEXTROSE, SODIUM CHLORIDE, SODIUM LACTATE, POTASSIUM CHLORIDE, AND CALCIUM CHLORIDE 5; .6; .31; .03; .02 G/100ML; G/100ML; G/100ML; G/100ML; G/100ML
125 INJECTION, SOLUTION INTRAVENOUS CONTINUOUS
Status: DISCONTINUED | OUTPATIENT
Start: 2023-05-01 | End: 2023-05-01 | Stop reason: HOSPADM

## 2023-05-01 RX ORDER — PHENYLEPHRINE HCL IN 0.9% NACL 0.4MG/10ML
SYRINGE (ML) INTRAVENOUS AS NEEDED
Status: DISCONTINUED | OUTPATIENT
Start: 2023-05-01 | End: 2023-05-01 | Stop reason: HOSPADM

## 2023-05-01 RX ORDER — OXYCODONE HYDROCHLORIDE 5 MG/1
5 TABLET ORAL AS NEEDED
Status: DISCONTINUED | OUTPATIENT
Start: 2023-05-01 | End: 2023-05-01 | Stop reason: HOSPADM

## 2023-05-01 RX ORDER — ACETAMINOPHEN 325 MG/1
650 TABLET ORAL EVERY 6 HOURS
Status: DISCONTINUED | OUTPATIENT
Start: 2023-05-01 | End: 2023-05-02 | Stop reason: HOSPADM

## 2023-05-01 RX ORDER — TRAMADOL HYDROCHLORIDE 50 MG/1
50 TABLET ORAL
Status: DISCONTINUED | OUTPATIENT
Start: 2023-05-01 | End: 2023-05-02 | Stop reason: HOSPADM

## 2023-05-01 RX ORDER — DIPHENHYDRAMINE HYDROCHLORIDE 50 MG/ML
12.5 INJECTION, SOLUTION INTRAMUSCULAR; INTRAVENOUS AS NEEDED
Status: DISCONTINUED | OUTPATIENT
Start: 2023-05-01 | End: 2023-05-01 | Stop reason: HOSPADM

## 2023-05-01 RX ORDER — FAMOTIDINE 20 MG/1
20 TABLET, FILM COATED ORAL 2 TIMES DAILY
Status: DISCONTINUED | OUTPATIENT
Start: 2023-05-01 | End: 2023-05-02 | Stop reason: HOSPADM

## 2023-05-01 RX ORDER — ONDANSETRON 2 MG/ML
4 INJECTION INTRAMUSCULAR; INTRAVENOUS
Status: DISCONTINUED | OUTPATIENT
Start: 2023-05-01 | End: 2023-05-02 | Stop reason: HOSPADM

## 2023-05-01 RX ORDER — BACLOFEN 10 MG/1
5 TABLET ORAL 3 TIMES DAILY
Status: DISCONTINUED | OUTPATIENT
Start: 2023-05-01 | End: 2023-05-02 | Stop reason: HOSPADM

## 2023-05-01 RX ORDER — OXYCODONE HYDROCHLORIDE 5 MG/1
5 TABLET ORAL
Status: DISCONTINUED | OUTPATIENT
Start: 2023-05-01 | End: 2023-05-02 | Stop reason: HOSPADM

## 2023-05-01 RX ORDER — HYDROMORPHONE HYDROCHLORIDE 1 MG/ML
0.5 INJECTION, SOLUTION INTRAMUSCULAR; INTRAVENOUS; SUBCUTANEOUS
Status: DISCONTINUED | OUTPATIENT
Start: 2023-05-01 | End: 2023-05-02 | Stop reason: HOSPADM

## 2023-05-01 RX ORDER — MIDAZOLAM HYDROCHLORIDE 1 MG/ML
1 INJECTION, SOLUTION INTRAMUSCULAR; INTRAVENOUS
Status: DISCONTINUED | OUTPATIENT
Start: 2023-05-01 | End: 2023-05-01 | Stop reason: HOSPADM

## 2023-05-01 RX ORDER — SODIUM CHLORIDE, SODIUM LACTATE, POTASSIUM CHLORIDE, CALCIUM CHLORIDE 600; 310; 30; 20 MG/100ML; MG/100ML; MG/100ML; MG/100ML
INJECTION, SOLUTION INTRAVENOUS
Status: DISCONTINUED | OUTPATIENT
Start: 2023-05-01 | End: 2023-05-01 | Stop reason: HOSPADM

## 2023-05-01 RX ORDER — BUDESONIDE 3 MG/1
3 CAPSULE, COATED PELLETS ORAL DAILY
Status: DISCONTINUED | OUTPATIENT
Start: 2023-05-01 | End: 2023-05-02 | Stop reason: HOSPADM

## 2023-05-01 RX ADMIN — LEVOTHYROXINE, LIOTHYRONINE 90 MG: 38; 9 TABLET ORAL at 12:54

## 2023-05-01 RX ADMIN — OXYCODONE 5 MG: 5 TABLET ORAL at 21:29

## 2023-05-01 RX ADMIN — ACETAMINOPHEN 650 MG: 325 TABLET ORAL at 12:56

## 2023-05-01 RX ADMIN — DOCUSATE SODIUM 50 MG AND SENNOSIDES 8.6 MG 1 TABLET: 8.6; 5 TABLET, FILM COATED ORAL at 12:57

## 2023-05-01 RX ADMIN — PREGABALIN 25 MG: 25 CAPSULE ORAL at 21:29

## 2023-05-01 RX ADMIN — ACETAMINOPHEN 650 MG: 325 TABLET ORAL at 23:55

## 2023-05-01 RX ADMIN — CEFAZOLIN 2 G: 1 INJECTION, POWDER, FOR SOLUTION INTRAMUSCULAR; INTRAVENOUS at 16:25

## 2023-05-01 RX ADMIN — POLYETHYLENE GLYCOL 3350 17 G: 17 POWDER, FOR SOLUTION ORAL at 12:54

## 2023-05-01 RX ADMIN — OXYCODONE 5 MG: 5 TABLET ORAL at 16:23

## 2023-05-01 RX ADMIN — MIDAZOLAM 2 MG: 1 INJECTION INTRAMUSCULAR; INTRAVENOUS at 08:44

## 2023-05-01 RX ADMIN — FAMOTIDINE 20 MG: 20 TABLET ORAL at 12:56

## 2023-05-01 RX ADMIN — FAMOTIDINE 20 MG: 20 TABLET ORAL at 18:57

## 2023-05-01 RX ADMIN — BUDESONIDE 3 MG: 3 CAPSULE, GELATIN COATED ORAL at 23:57

## 2023-05-01 RX ADMIN — KETOROLAC TROMETHAMINE 15 MG: 30 INJECTION, SOLUTION INTRAMUSCULAR; INTRAVENOUS at 18:59

## 2023-05-01 RX ADMIN — SODIUM CHLORIDE, PRESERVATIVE FREE 10 ML: 5 INJECTION INTRAVENOUS at 17:40

## 2023-05-01 RX ADMIN — DOCUSATE SODIUM 50 MG AND SENNOSIDES 8.6 MG 1 TABLET: 8.6; 5 TABLET, FILM COATED ORAL at 18:57

## 2023-05-01 RX ADMIN — PROPOFOL 75 MCG/KG/MIN: 10 INJECTION, EMULSION INTRAVENOUS at 08:49

## 2023-05-01 RX ADMIN — ATORVASTATIN CALCIUM 10 MG: 10 TABLET, FILM COATED ORAL at 18:58

## 2023-05-01 RX ADMIN — ACETAMINOPHEN 650 MG: 325 TABLET ORAL at 18:57

## 2023-05-01 RX ADMIN — WATER 2 G: 1 INJECTION INTRAMUSCULAR; INTRAVENOUS; SUBCUTANEOUS at 09:07

## 2023-05-01 RX ADMIN — SODIUM CHLORIDE: 900 INJECTION, SOLUTION INTRAVENOUS at 09:53

## 2023-05-01 RX ADMIN — BACLOFEN 5 MG: 10 TABLET ORAL at 21:29

## 2023-05-01 RX ADMIN — BACLOFEN 5 MG: 10 TABLET ORAL at 16:23

## 2023-05-01 RX ADMIN — ACETAMINOPHEN 1000 MG: 500 TABLET ORAL at 08:26

## 2023-05-01 RX ADMIN — CELECOXIB 200 MG: 200 CAPSULE ORAL at 08:25

## 2023-05-01 RX ADMIN — SODIUM CHLORIDE, POTASSIUM CHLORIDE, SODIUM LACTATE AND CALCIUM CHLORIDE 125 ML/HR: 600; 310; 30; 20 INJECTION, SOLUTION INTRAVENOUS at 08:12

## 2023-05-01 RX ADMIN — SODIUM CHLORIDE 125 ML/HR: 9 INJECTION, SOLUTION INTRAVENOUS at 10:56

## 2023-05-01 RX ADMIN — SODIUM CHLORIDE, PRESERVATIVE FREE 10 ML: 5 INJECTION INTRAVENOUS at 22:00

## 2023-05-01 RX ADMIN — ASPIRIN 81 MG: 81 TABLET, COATED ORAL at 18:57

## 2023-05-01 RX ADMIN — Medication 80 MCG: at 10:05

## 2023-05-01 RX ADMIN — TRANEXAMIC ACID 1 G: 100 INJECTION, SOLUTION INTRAVENOUS at 09:07

## 2023-05-01 RX ADMIN — FENTANYL CITRATE 50 MCG: 50 INJECTION INTRAMUSCULAR; INTRAVENOUS at 08:43

## 2023-05-01 RX ADMIN — ASPIRIN 81 MG: 81 TABLET, COATED ORAL at 12:58

## 2023-05-01 RX ADMIN — Medication 80 MCG: at 09:57

## 2023-05-01 RX ADMIN — PREGABALIN 25 MG: 25 CAPSULE ORAL at 16:23

## 2023-05-01 RX ADMIN — SODIUM CHLORIDE, POTASSIUM CHLORIDE, SODIUM LACTATE AND CALCIUM CHLORIDE: 600; 310; 30; 20 INJECTION, SOLUTION INTRAVENOUS at 08:47

## 2023-05-01 NOTE — ROUTINE PROCESS
Patient: Rajesh Gonzalez MRN: [id number]  SSN: [id number]   YOB: 1945  Age: 69 y.o. Sex: female     Patient is status post Procedure(s):  LEFT UNICOMPARTMENTAL KNEE ARTHROPLASTY. Surgeon(s) and Role:     * Heather Jean Baptiste MD - Primary    Local/Dose/Irrigation:  SEE MAR                  Peripheral IV 05/01/23 Left Wrist (Active)                           Dressing/Packing:  Incision 05/01/23 Knee Left-Dressing/Treatment: Cast padding; Ace wrap;Skin glue; Other (Comment) (AQUACEL) (05/01/23 9573)    Splint/Cast:  ]    Other:  NA

## 2023-05-01 NOTE — PERIOP NOTES
TRANSFER - OUT REPORT:    Verbal report given to [facility], RN   on Pilar Orozco  being transferred to Room 568  for routine progression of care       Report consisted of patients Situation, Background, Assessment and   Recommendations(SBAR). Time Pre op antibiotic given:0907  Anesthesia Stop time: 2258    Information from the following report(s) Procedure Summary, Intake/Output, and MAR was reviewed with the receiving nurse. Opportunity for questions and clarification was provided. Is the patient on 02? NO       L/Min        Other     Is the patient on a monitor? no    Is the nurse transporting with the patient? NO    Surgical Waiting Area notified of patient's transfer from PACU? YES      The following personal items collected during your admission accompanied patient upon transfer:   Dental Appliance: Dental Appliances: None  Vision:    Hearing Aid:    Jewelry:    Clothing: Clothing: At bedside (clothing and cane returned in PACU)  Other Valuables:  Other Valuables: Cane (cane to pacu)  Valuables sent to safe:

## 2023-05-01 NOTE — H&P
H and P    Subjective:         Tamara Walker is a 69 y.o. female who presents for L uni after failure conservative mgmt    Patient Active Problem List    Diagnosis Date Noted    Localized osteoarthritis of left knee 05/01/2023    Current moderate episode of major depressive disorder without prior episode ([facility] [address] 01/24/2023    Intractable chronic migraine without aura and without status migrainosus 01/24/2023    Headache 01/12/2023    Lumbar spondylolysis 04/05/2022    Colitis 10/04/2021    Nontraumatic tear of right rotator cuff 09/14/2021    Migraine with aura, not intractable, without status migrainosus 09/24/2020    Cervicogenic headache 09/24/2020    Degenerative lumbar spinal stenosis 09/24/2020    Cervical radiculopathy due to degenerative joint disease of spine 09/24/2020    Hyperlipemia     Degenerative cervical spinal stenosis     Occipital neuralgia of right side     Thyroid disease     Lumbar radiculopathy 08/18/2020    Shoulder impingement, right 04/06/2020    Chronic right shoulder pain 04/06/2020    Spinal stenosis of cervical region 07/11/2017    Cervical spondylosis 05/02/2017    Myofascial pain dysfunction syndrome 05/02/2017    Other specified acquired hypothyroidism 03/21/2017    Primary osteoarthritis of left knee 08/31/2016    Inflammation of sacroiliac joint ([facility] [address] 12/29/2015    Lumbar spondylosis 12/29/2015    Cervical radiculopathy 02/24/2015    Degeneration of lumbar or lumbosacral intervertebral disc 12/16/2014    Diverticulosis of intestine 07/24/2014    History of colonic polyps 07/24/2014    Foot pain 06/10/2008    Malignant neoplasm of skin 08/14/2007    Atopic rhinitis 06/19/2007    Actinic keratosis 04/14/1999    Family history of malignant neoplasm of breast 07/14/1997    Menopausal syndrome 01/01/1980     Family History   Problem Relation Age of Onset    Dementia Mother     Breast Cancer Mother     Other Mother         POLIO AND COLITIS    Cancer Mother         Breast cancer, alzheimers    COPD Father     Migraines Child     Allergic Rhinitis Child     Sleep Apnea Child     Anesth Problems Neg Hx       Social History     Tobacco Use    Smoking status: Former     Packs/day: 1.00     Years: 5.00     Pack years: 5.00     Types: Cigarettes     Quit date:      Years since quittin.3    Smokeless tobacco: Never   Substance Use Topics    Alcohol use: Not Currently     Alcohol/week: 0.0 standard drinks     Comment: very occasionally     Past Medical History:   Diagnosis Date    Arthritis     Cancer ([address]     Skin    Chronic pain     LEFT KNEE AND BACK, NECK AND HEAD    Colitis     Degenerative cervical spinal stenosis     Degenerative lumbar spinal stenosis     Hyperlipemia     Hypertension     Migraine     Occipital neuralgia of right side     Thyroid disease     Ulceration of vulva 2008    Wound of left leg 2017      Past Surgical History:   Procedure Laterality Date    COLONOSCOPY N/A 10/04/2021    COLONOSCOPY WITH BIOPSY performed by Jia Chandra MD at [facility] ENDOSCOPY    COLONOSCOPY,DIAGNOSTIC  10/04/2021         HX ANKLE FRACTURE TX Left     HX APPENDECTOMY      HX COLONOSCOPY      HX GYN      hysterectomy at age 28    HX ORTHOPAEDIC Left 2017    rotator cuff repair    HX WISDOM TEETH EXTRACTION        Prior to Admission medications    Medication Sig Start Date End Date Taking? Authorizing Provider   vit C,I-Fh-bwzvf-lutein-zeaxan (PreserVision AREDS-2) 250-90-40-1 mg cap capsule Take 1 Capsule by mouth daily. Yes Provider, Historical   butalbital-acetaminophen-caffeine (FIORICET, ESGIC) -40 mg per tablet TAKE 2 TABLETS BY MOUTH EVERY 8 HOURS AS NEEDED FOR HEADACHE 23  Yes Makenna Franco ANP-C   pregabalin (LYRICA) 25 mg capsule Take 1 Capsule by mouth three (3) times daily. Max Daily Amount: 75 mg. 23  Yes Makenna Franco ANP-C   cholecalciferol, vitamin D3, 50 mcg (2,000 unit) tab Take 1 Tablet by mouth daily.    Yes Provider, Historical cyanocobalamin 1,000 mcg tablet Take 3 Tablets by mouth daily. Yes Provider, Historical   multivitamin (ONE A DAY) tablet Take 1 Tablet by mouth daily. Yes Provider, Historical   budesonide (ENTOCORT EC) 3 mg capsule TAKE 3 CAPSULES BY MOUTH DAILY 1/3/22  Yes Provider, Historical   B.infantis-B.ani-B.long-B.bifi (Probiotic 4X) 10-15 mg TbEC Take  by mouth daily. Yes Provider, Historical   atorvastatin (LIPITOR) 10 mg tablet Take 1 Tablet by mouth every evening. 6/30/20  Yes Provider, Historical   diclofenac EC (VOLTAREN) 75 mg EC tablet 1 Tablet two (2) times a day. 7/26/20  Yes Provider, Historical   L.acidoph,paracasei, B.lactis (ENVIVE PO) Take 1 Tablet by mouth daily. Provider, Historical   baclofen (LIORESAL) 10 mg tablet Take 0.5 Tablets by mouth three (3) times daily. 1/30/23   Nayely Franco, RIGO-C   thyroid, Pork, (ARMOUR) 90 mg tablet Take 1 Tablet by mouth daily. Monday THRU Friday ONLY    Provider, Historical   conjugated estrogens (PREMARIN) 0.625 mg tablet Take 1 Tablet by mouth three (3) days a week.  3 times weekly, no specific days    Provider, Historical     Current Facility-Administered Medications   Medication Dose Route Frequency    acetaminophen (TYLENOL) tablet 1,000 mg  1,000 mg Oral ONCE    pregabalin (LYRICA) capsule 75 mg  75 mg Oral ONCE    celecoxib (CELEBREX) capsule 200 mg  200 mg Oral ONCE    tranexamic acid (CYKLOKAPRON) 1,000 mg in 0.9% sodium chloride 100 mL IVPB  1 g IntraVENous ONCE    ceFAZolin (ANCEF) 2 g in sterile water (preservative free) 20 mL IV syringe  2 g IntraVENous ONCE    dexamethasone (PF) (DECADRON) 10 mg/mL injection 10 mg  10 mg IntraVENous ONCE    lactated Ringers infusion  125 mL/hr IntraVENous CONTINUOUS    dextrose 5% lactated ringers infusion  125 mL/hr IntraVENous CONTINUOUS    sodium chloride (NS) flush 5-40 mL  5-40 mL IntraVENous Q8H    sodium chloride (NS) flush 5-40 mL  5-40 mL IntraVENous PRN    lidocaine (PF) (XYLOCAINE) 10 mg/mL (1 %) injection 0.5 mL  0.5 mL SubCUTAneous PRN    fentaNYL citrate (PF) injection 50 mcg  50 mcg IntraVENous PRN    midazolam (VERSED) injection 1 mg  1 mg IntraVENous PRN    midazolam (VERSED) injection 1 mg  1 mg IntraVENous PRN    ROPivacaine (PF) (NAROPIN) 5 mg/mL (0.5 %) injection 30 mL  30 mL Peripheral Nerve Block PRN      Allergies   Allergen Reactions    Cephalexin Other (comments)     Other reaction(s): Dermatological problems, e.g., rash, hives    Penicillins Rash     Other reaction(s): Dermatological problems, e.g., rash, hives  Other reaction(s): Dermatological problems, e.g., rash, hives  Patient screened for any delayed non-IgE-mediated reaction to PCN. Patient notes the following:    No delayed non-IgE-mediated reaction to PCN             Aspirin Rash and Swelling     Unable to take large dosages  Unable to take large dosages      Codeine Other (comments)     Rash, unable to take large dosages       Cymbalta [Duloxetine] Nausea and Vomiting    Methylprednisolone Other (comments)     INSOMNIA, FREQUENT HEART RACING      Penicillamine Rash    Penicillamine Rash    Prednisone Other (comments)     Insomnia, frequent and heart racing    Synthroid [Levothyroxine] Rash and Itching     To breast area    Levothyroxine Itching and Rash     To breast area        Review of Systems:    Negative for fevers, chills, nausea, vomiting, chest pain, shortness of breath, headaches.               Objective:     Patient Vitals for the past 24 hrs:   Temp Pulse Resp BP SpO2   23 0745 97.9 °F (36.6 °C) 63 16 136/68 100 %       Temp (24hrs), Av.9 °F (36.6 °C), Min:97.9 °F (36.6 °C), Max:97.9 °F (36.6 °C)      Gen: NAD, A&Ox3  Resp: Non-labored breathing  CV: Extremities well perfused  Abd: soft, NT  LLE:  pain with ROM, no swelling about knee or ankle, skin intact, warm well perfused, SILT in al distributions L3-S1, palpable pedal pulses, no calf tenderness    Imaging Review: Left medial knee OA with joint space narrowing    Labs: No results found for this or any previous visit (from the past 24 hour(s)). Current Facility-Administered Medications   Medication Dose Route Frequency    acetaminophen (TYLENOL) tablet 1,000 mg  1,000 mg Oral ONCE    pregabalin (LYRICA) capsule 75 mg  75 mg Oral ONCE    celecoxib (CELEBREX) capsule 200 mg  200 mg Oral ONCE    tranexamic acid (CYKLOKAPRON) 1,000 mg in 0.9% sodium chloride 100 mL IVPB  1 g IntraVENous ONCE    ceFAZolin (ANCEF) 2 g in sterile water (preservative free) 20 mL IV syringe  2 g IntraVENous ONCE    dexamethasone (PF) (DECADRON) 10 mg/mL injection 10 mg  10 mg IntraVENous ONCE    lactated Ringers infusion  125 mL/hr IntraVENous CONTINUOUS    dextrose 5% lactated ringers infusion  125 mL/hr IntraVENous CONTINUOUS    sodium chloride (NS) flush 5-40 mL  5-40 mL IntraVENous Q8H    sodium chloride (NS) flush 5-40 mL  5-40 mL IntraVENous PRN    lidocaine (PF) (XYLOCAINE) 10 mg/mL (1 %) injection 0.5 mL  0.5 mL SubCUTAneous PRN    fentaNYL citrate (PF) injection 50 mcg  50 mcg IntraVENous PRN    midazolam (VERSED) injection 1 mg  1 mg IntraVENous PRN    midazolam (VERSED) injection 1 mg  1 mg IntraVENous PRN    ROPivacaine (PF) (NAROPIN) 5 mg/mL (0.5 %) injection 30 mL  30 mL Peripheral Nerve Block PRN         Impression:     Active Problems:    Localized osteoarthritis of left knee (5/1/2023)        Plan:   Plan for L Uni    Risks and benefits of joint arthroplasty discussed at length including but not limited to bleeding, need for blood transfusion, infection, damage to surrounding structures, intra-operative fracture, blood clots, pulmonary embolism, death. The patient understands the risks of surgery. All questions answered. They elected to move forward.      Do Herrera MD

## 2023-05-01 NOTE — PROGRESS NOTES
Occupational Therapy  5/1/23    OT orders received and acknowledged. Per chart, patient is POD #0 left unicompartmental knee arthoplasty. Will defer evaluation and follow up tomorrow.      Thank you,   Shawanda Nguyen, OTR/L

## 2023-05-01 NOTE — PROGRESS NOTES
Patient has urinated multiple times to bedside commode. Can get in and out of bed without assistance. She passed gas and moved her bowels.

## 2023-05-01 NOTE — ANESTHESIA POSTPROCEDURE EVALUATION
Procedure(s):  LEFT UNICOMPARTMENTAL KNEE ARTHROPLASTY. spinal    Anesthesia Post Evaluation        Patient location during evaluation: PACU  Patient participation: complete - patient participated  Level of consciousness: awake and alert  Pain management: adequate  Airway patency: patent  Anesthetic complications: no  Cardiovascular status: acceptable  Respiratory status: acceptable  Hydration status: acceptable  Comments: I have seen and evaluated the patient and is ready for discharge. Eliecer Reno MD    Post anesthesia nausea and vomiting:  none      INITIAL Post-op Vital signs:   Vitals Value Taken Time   /44 05/01/23 1115   Temp 36.4 °C (97.5 °F) 05/01/23 1100   Pulse 63 05/01/23 1120   Resp 14 05/01/23 1120   SpO2 99 % 05/01/23 1120   Vitals shown include unvalidated device data.

## 2023-05-01 NOTE — H&P
Date of Surgery Update:  Sherrie Pacheco was seen and examined. History and physical has been reviewed. The patient has been examined. There have been no significant clinical changes since the completion of the originally dated History and Physical.  Patient identified by surgeon; surgical site was confirmed by patient and surgeon.     Signed By: Katie Rabago MD     May 1, 2023 7:23 AM

## 2023-05-01 NOTE — DISCHARGE INSTRUCTIONS
Discharge Instructions Knee Replacement  Dr. Mary Vergara  Patient Name  Jorge Bah  Date of procedure  5/1/2023    Procedure  Procedure(s):  LEFT UNICOMPARTMENTAL KNEE ARTHROPLASTY  Surgeon  Surgeon(s) and Role:     * Marilyn Beck MD - Primary  Date of discharge: [unfilled]  PCP: @PCP@    Follow up care  Follow up visit with Dr. Mary Vergara in 4 weeks. Call [phone] [facility] to make an appointment. If Home Health has been arranged for you, they will call you to arrange dates/times for visits. Call them if you do not hear from them within 24 hours after you go home. Activity at home  Take a short walk every hour; except at night when sleeping. Do your Home Exercise Program 3 times every day. After exercising lie down and elevate your leg on pillows for 15-30 minutes to decrease swelling. Refer to your patient notebook for more information. Bathing and caring for your incision  You may take a shower with your waterproof dressing on your knee. The waterproof dressing is to stay on your knee for 7 days. On the 7th day have someone gently peel the dressing off by lifting the edge and stretching it to break the seal.  You may then leave your incision open to air unless you see drainage from your knee. Preventing blood clots  Take Aspirin 81mg twice each day for one month (30 days) following surgery. Call Dr. Mary Vergara for signs of a blood clot in your leg: calf pain, tenderness, redness, swelling of lower leg   Preventing lung congestion  Use your incentive spirometer 4 times a day; do 10 repetitions each time  Remember to keep the small blue ball between the two arrows when taking a slow, deep breath   Pain Management  Get up and walk a short distance to relieve pain and stiffness. Place ice wrap on your knee except when you are walking. The gel ice packs should be changed about every 4 hours. Elevate your leg on pillows for 15-30 minutes.    Pain Medications  Take Tylenol 650mg (take two 325mg tablets) every 6 hours for the next 4 weeks. Take Meloxicam (Mobic) every day as prescribed to decrease swelling and inflammation. Do not take Meloxicam if you have had stomach ulcers. Take Famotidine (Pepcid) 20mg twice a day to prevent an upset stomach (if taking Aspirin and/or Meloxicam). If needed, take Tramadol (narcotic pain pill) every 6 hours as prescribed. If Tramadol has not relieved your pain within 1 hour, take Oxycodone 5mg (narcotic pain pill). Take Oxycodone only if needed and stop once your pain is tolerable. Take all medications with a small amount of food. As your pain decreases, take the narcotics less often or take ½ of a pill. Call Dr. Bebeto Heller if you have side effects from your narcotic pain medication: itching, drowsiness, dizziness, upset stomach, dry mouth, constipation or if you medication is not relieving your pain. Diet after surgery  You may resume your normal diet. Include vegetables, fruit, whole grains, lean meats, and low-fat dairy products. Eat food high in fiber. Drink plenty of fluids, including 8 cups of water daily. Take a stool softener (Senokot-s or Colace) to prevent constipation. If constipation occurs you may take a laxative (Milk of Magnesia, Dulcolax tablets). Avoid after surgery  Do not take any over-the-counter medication for pain except Tylenol  Do not take more than 3000mg (3 Grams) of Tylenol in 24 hours  Do not drink alcoholic beverages  Do not smoke  Do not drive until seen for follow up appointment  Do not place frozen gel pack directly on your skin. It can cause frostbite. Do not take a tub bath, swim or get in a hot tub for 8 weeks  Prevention of falls and safety at home  Set up an area where you can rest comfortably leaving space around furniture to allow you to walk with your walker. Keep stairs, hallways and bathrooms well lit; especially at night. Arrange for care for your pets  Keep your home free of clutter.    Call Dr. Bebeto Heller at [phone] for:  Pain that is not relieved by pain medication, ice and activity  Side effects of medications  Increased/spread of bruising  Warning signs of infection:  persistent fever greater than 100 degrees  shaking or chills  increased redness, tenderness, swelling or drainage from incision  increased pain during activity or rest  Warning signs of a blood clot in your leg:  increased pain in your calf  tenderness or redness  increased swelling or knee, calf, ankle or foot    Call [phone] after 5pm or on a weekend.  The on call physician will return your phone call  Call your Primary Care Doctor for:   Concerns about your medical conditions such as diabetes, high blood pressure, asthma, congestive heart failure  Blood sugars greater than 180  Persistent headache or dizziness  Coughing or congestion  Constipation or diarrhea  Burning when you go to the bathroom  Abnormal heart rate (fast or  slow)      Call 911 and go to the nearest hospital for:   Sudden increased shortness of breath  Sudden onset of chest pain  Difficulty breathing  Localized chest pain with coughing or taking a deep breath

## 2023-05-01 NOTE — PROGRESS NOTES
Problem: Mobility Impaired (Adult and Pediatric)  Goal: *Acute Goals and Plan of Care (Insert Text)  Description: FUNCTIONAL STATUS PRIOR TO ADMISSION: Patient was independent and active without use of DME.    HOME SUPPORT PRIOR TO ADMISSION: The patient lived with spouse but did not require assist.    Physical Therapy Goals  Initiated 5/1/2023    1. Patient will move from supine to sit and sit to supine , scoot up and down, and roll side to side in bed with modified independence within 4 days. 2. Patient will perform sit to stand with modified independence within 4 days. 3. Patient will ambulate with modified independence for 150 feet with the least restrictive device within 4 days. 4. Patient will ascend/descend 4 stairs with cane and one handrail(s) with modified independence within 4 days. 5. Patient will perform home exercise program per protocol with independence within 4 days. 6. Patient will demonstrate AROM 0-90 degrees in operative joint within 4 days. Outcome: Progressing Towards Goal   PHYSICAL THERAPY EVALUATION  Patient: Curtis Ring (76 y.o. female)  Date: 5/1/2023  Primary Diagnosis: Localized osteoarthritis of left knee [M17.12]  Procedure(s) (LRB):  LEFT UNICOMPARTMENTAL KNEE ARTHROPLASTY (Left) Day of Surgery   Precautions:   Fall, WBAT    ASSESSMENT  Based on the objective data described below, the patient presents with  impairment in functional mobility, activity tolerance and balance s/p L unilateral knee. PLOF: Independent with ADLs and IADLs. Patient lives with spouse in a one home with 3 steps with rail to enter. Patient's mobility was on target for POD#0. Will address more exercises, increase gait distance, negotiate stairs and assess for discharge at am PT session tomorrow. Patient instructed NOT to get up from bed, chair or commode without calling for assistance. Initiated post TKA exercise protocol and wrote same on [employer].  Anticipate early discharge after am  PT session tomorrow. Current Level of Function Impacting Discharge (mobility/balance): Stand by assistance for all mobility    Functional Outcome Measure: The patient scored 55/100 on the Barthel outcome measure which is indicative of moderate impaired ability to care for basic self-needs/dependency on others. .      Other factors to consider for discharge: Motivated/A & O x 4/Supportive Family/Independent PLOF      Patient will benefit from skilled therapy intervention to address the above noted impairments. PLAN :  Recommendations and Planned Interventions: bed mobility training, transfer training, gait training, therapeutic exercises, patient and family training/education, and therapeutic activities      Frequency/Duration: Patient will be followed by physical therapy:  twice daily to address goals. Recommendation for discharge: (in order for the patient to meet his/her long term goals)  Physical therapy at least 2 days/week in the home     This discharge recommendation:  Has been made in collaboration with the attending provider and/or case management    IF patient discharges home will need the following DME: patient owns DME required for discharge         SUBJECTIVE:   Patient stated I am feeling pretty good.     OBJECTIVE DATA SUMMARY:   HISTORY:    Past Medical History:   Diagnosis Date    Arthritis     Cancer ([address]     Skin    Chronic pain     LEFT KNEE AND BACK, NECK AND HEAD    Colitis     Degenerative cervical spinal stenosis     Degenerative lumbar spinal stenosis     Hyperlipemia     Hypertension     Migraine     Occipital neuralgia of right side     Thyroid disease     Ulceration of vulva 01/17/2008    Wound of left leg 12/13/2017     Past Surgical History:   Procedure Laterality Date    COLONOSCOPY N/A 10/04/2021    COLONOSCOPY WITH BIOPSY performed by Porter Junior MD at [facility] ENDOSCOPY    COLONOSCOPY,DIAGNOSTIC  10/04/2021         HX ANKLE FRACTURE TX Left     HX APPENDECTOMY      HX COLONOSCOPY      HX GYN      hysterectomy at age 28    HX ORTHOPAEDIC Left 2017    rotator cuff repair    HX WISDOM TEETH EXTRACTION         Personal factors and/or comorbidities impacting plan of care: Motivated/A & O x 4/Supportive Family/Independent PLOF     Home Situation  Home Environment: Private residence  # Steps to Enter: 3 (threshold)  Rails to Enter: Yes  Hand Rails : Left  One/Two Story Residence: One story  Living Alone: No  Support Systems: Spouse/Significant Other, Child(south)  Patient Expects to be Discharged to[de-identified] Home with home health  Current DME Used/Available at Home: Grab bars, Raised toilet seat, Shower chair, Walker, rolling, Cane, straight, Safety frame toliet  Tub or Shower Type: Tub/Shower combination    EXAMINATION/PRESENTATION/DECISION MAKING:   Critical Behavior:  Neurologic State: Alert  Orientation Level: Oriented X4  Cognition: Follows commands     Hearing:     Skin:  Ace Wrap Intact with no drainage appreciated. Edema: Normal post-op inflammation   Range Of Motion:  AROM: Generally decreased, functional           PROM: Generally decreased, functional           Strength:    Strength: Generally decreased, functional                    Tone & Sensation:   Tone: Normal              Sensation: Intact               Coordination:  Coordination: Within functional limits  Vision:      Functional Mobility:  Bed Mobility:     Supine to Sit: Stand-by assistance;Setup  Sit to Supine: Stand-by assistance;Setup  Scooting: Stand-by assistance  Transfers:  Sit to Stand: Stand-by assistance  Stand to Sit: Stand-by assistance                       Balance:   Sitting: Intact  Standing: Intact; With support  Ambulation/Gait Training:  Distance (ft): 65 Feet (ft)  Assistive Device: Walker, rolling;Gait belt  Ambulation - Level of Assistance: Stand-by assistance        Gait Abnormalities: Antalgic     Left Side Weight Bearing: As tolerated  Base of Support: Widened  Stance: Left decreased  Speed/Lilli: Slow  Step Length: Right shortened  Swing Pattern: Left asymmetrical     Interventions: Safety awareness training;Verbal cues         Therapeutic Exercises: Ankle Pumps  Ham Sets  Quad Sets  Glute Sets  Heel Slides  X 10 each every hour     Functional Measure:  Barthel Index:    Bathin  Bladder: 10  Bowels: 10  Groomin  Dressin  Feeding: 10  Mobility: 0  Stairs: 0  Toilet Use: 5  Transfer (Bed to Chair and Back): 10  Total: 55/100       The Barthel ADL Index: Guidelines  1. The index should be used as a record of what a patient does, not as a record of what a patient could do. 2. The main aim is to establish degree of independence from any help, physical or verbal, however minor and for whatever reason. 3. The need for supervision renders the patient not independent. 4. A patient's performance should be established using the best available evidence. Asking the patient, friends/relatives and nurses are the usual sources, but direct observation and common sense are also important. However direct testing is not needed. 5. Usually the patient's performance over the preceding 24-48 hours is important, but occasionally longer periods will be relevant. 6. Middle categories imply that the patient supplies over 50 per cent of the effort. 7. Use of aids to be independent is allowed. Score Interpretation (from [address] [address])    Independent   60-79 Minimally independent   40-59 Partially dependent   20-39 Very dependent   <20 Totally dependent     -Anurag Romero., Barthel, D.W. (1965). Functional evaluation: the Barthel Index. [facility] ([address] [facility] [address] (). The Barthel activities of daily living index: self-reporting versus actual performance in the old (> or = 75 years). Journal of [address] [place] [address] 45(7), [facility], .SRIRAM, Michael Desir., Erica Leblanc. (1999).  Measuring the change in disability after inpatient rehabilitation; comparison of the responsiveness of the Barthel Index and Functional Riley Measure. Journal of Neurology, Neurosurgery, and Psychiatry, 66(4), 450-400. THIERRY Mcdowell, [facility], & Barbara Weiss M.A. (2004) Assessment of post-stroke quality of life in cost-effectiveness studies: The usefulness of the Barthel Index and the EuroQoL-5D. Quality of Life Research, 15, 772-16           Physical Therapy Evaluation Charge Determination   History Examination Presentation Decision-Making   LOW Complexity : Zero comorbidities / personal factors that will impact the outcome / POC LOW Complexity : 1-2 Standardized tests and measures addressing body structure, function, activity limitation and / or participation in recreation  LOW Complexity : Stable, uncomplicated  LOW Complexity : FOTO score of       Based on the above components, the patient evaluation is determined to be of the following complexity level: LOW     Pain Ratin/10    Activity Tolerance:   Good    After treatment patient left in no apparent distress:   Supine in bed, Call bell within reach, Side rails x 3, and nurse notified. COMMUNICATION/EDUCATION:   The patients plan of care was discussed with: Registered nurse, Physician, Case management, and Rehabilitation technician. Fall prevention education was provided and the patient/caregiver indicated understanding., Patient/family have participated as able in goal setting and plan of care. , and Patient/family agree to work toward stated goals and plan of care.     Thank you for this referral.  Ethan Orozco   Time Calculation: 35 mins

## 2023-05-01 NOTE — OP NOTES
Name: Lisa Handley  MRN:  [id number]  : 1945  Age:  69 y.o. Surgery Date: 2023      OPERATIVE REPORT   LEFT UNCOMPARTMENTAL KNEE REPLACEMENT    PREOPERATIVE DIAGNOSIS: Osteoarthritis medial compartment, left knee. POSTOPERATIVE DIAGNOSIS: Osteoarthritis medial compartment, left knee. PROCEDURE PERFORMED: Left unicompartmental knee arthroplasty. SURGEON: Maida Mancera MD    FIRST ASSISTANT:  Francisco Galloway PA-C    ANESTHESIA: Spinal    PRE-OP ANTIBIOTIC: Ancef 2g    COMPLICATIONS: None. ESTIMATED BLOOD LOSS: 50 mL. SPECIMENS REMOVED: None. COMPONENTS IMPLANTED:   Implant Name Type Inv. Item Serial No.  Lot No. LRB No. Used Action   CEMENT BNE GENTAMC GHV 40GM --  - SNA  CEMENT BNE GENTAMC GHV 40GM --  NA [facility] [id number] Left 1 Implanted   COMPONENT FEM 2 LM/RL KNEE UNI OXIN JOURNEY II - SNA  COMPONENT FEM 2 LM/RL KNEE UNI OXIN JOURNEY II NA [employer] [id number] Left 1 Implanted   BASEPLATE TIB 3 MEDL LT JOURNEY II UNI - SNA  BASEPLATE TIB 3 MEDL LT JOURNEY II UNI NA [employer] [id number] Left 1 Implanted   INSERT TIB 3-4 9 MM MEDL XLPE JOURNEY II UNI - SNA  INSERT TIB 3-4 9 MM MEDL XLPE JOURNEY II UNI NA [employer] [id number] Left 1 Implanted       INDICATIONS: The patient is an 69 yrs female with progressive debilitating left knee pain due to severe osteoarthritis. Symptoms have progressed despite comprehensive conservative treatment and he presents for left unicompartmental knee replacement. Risks, benefits, alternatives of the procedure were reviewed with them in detail and they desired to proceed. He understands the increased risk for perioperative medical complications due to their medical comorbidities. DESCRIPTION OF PROCEDURE: Anesthetic was initiated. Preoperative dose of antibiotic was given. Knight catheter was placed.  Left side was confirmed as the operative side, prepped and draped in the usual sterile fashion. Skin was covered with Ioban occlusive dressing. Tourniquet was only employed for cementation. Total tourniquet time was 7 minutes. Subvastus approach was made to the left knee. The knee was examined. Severe medial compartment disease. No issues on the lateral or patellofemoral joints. The knee was exposed. The tibial guide was used to make a proximal tibial cut at neutral with 5 degrees posterior slope. The femur was cut parallel to the tibia with a spacer block technique. The femur was sized. The cutting block was placed and provisionally pinned, examined the flexion gap, translated the femoral component as far lateral as possible and created a rectangular flexion gap with rotation. The femur was pinned and finished. The meniscus was removed. Cement was mixed and implants opened on the back table. Implants were placed and cement was allowed to fully cure with no motion and all excess cement was removed. At this point, the knee was ranged, irrigated copiously with pulsatile lavage. Soft tissues were infiltrated with local anesthetic. The arthrotomy was closed with #2 Vicryl sutures and 0 Vicryl sutures. Deep wound was again irrigated. Skin and subcutaneous were closed in standard fashion. Sterile dressing was applied. There were no complications. No specimen was sent. Shania Seaman PA-C was critical throughout the case to assist with positioning, retraction, instrument manipulation, and wound closure. Please note that no intern, resident, or other hospital surgical staff was available to assist during this procedure. Procedure was unicompartmental arthroplasty, left knee. The patient was taken to the recovery room in good condition.     Jin Thao MD

## 2023-05-01 NOTE — ANESTHESIA PREPROCEDURE EVALUATION
Relevant Problems   No relevant active problems       Anesthetic History   No history of anesthetic complications            Review of Systems / Medical History  Patient summary reviewed and pertinent labs reviewed    Pulmonary  Within defined limits                 Neuro/Psych         Headaches     Cardiovascular    Hypertension          Hyperlipidemia    Exercise tolerance: >4 METS     GI/Hepatic/Renal  Within defined limits              Endo/Other      Hypothyroidism: well controlled  Arthritis     Other Findings   Comments: Lumbar stenosis  Cervical stenosis           Physical Exam    Airway  Mallampati: I  TM Distance: > 6 cm    Mouth opening: Normal     Cardiovascular  Regular rate and rhythm,  S1 and S2 normal,  no murmur, click, rub, or gallop  Rhythm: regular  Rate: normal         Dental  No notable dental hx       Pulmonary  Breath sounds clear to auscultation               Abdominal  GI exam deferred       Other Findings            Anesthetic Plan    ASA: 2  Anesthesia type: spinal      Post-op pain plan if not by surgeon: peripheral nerve block single    Induction: Intravenous  Anesthetic plan and risks discussed with: Patient

## 2023-05-01 NOTE — PROGRESS NOTES
05/01/23 0920   Family Communication   Family Update Message Procedure started   Delivery Origin Nurse    Relationship to Patient Daughter    Phone Number [facility]   Family/Significant Other Update Called

## 2023-05-01 NOTE — PROGRESS NOTES
PHYSICAL THERAPY    Patient assessed but does not have proprioception return yet. Will assess again later.     Verlin Meckel

## 2023-05-02 VITALS
RESPIRATION RATE: 16 BRPM | SYSTOLIC BLOOD PRESSURE: 137 MMHG | HEIGHT: 65 IN | HEART RATE: 76 BPM | TEMPERATURE: 98.1 F | DIASTOLIC BLOOD PRESSURE: 63 MMHG | BODY MASS INDEX: 19.61 KG/M2 | OXYGEN SATURATION: 96 % | WEIGHT: 117.73 LBS

## 2023-05-02 LAB
ANION GAP SERPL CALC-SCNC: 4 MMOL/L (ref 5–15)
BUN SERPL-MCNC: 14 MG/DL (ref 6–20)
BUN/CREAT SERPL: 19 (ref 12–20)
CALCIUM SERPL-MCNC: 9.3 MG/DL (ref 8.5–10.1)
CHLORIDE SERPL-SCNC: 105 MMOL/L (ref 97–108)
CO2 SERPL-SCNC: 27 MMOL/L (ref 21–32)
CREAT SERPL-MCNC: 0.75 MG/DL (ref 0.55–1.02)
GLUCOSE SERPL-MCNC: 113 MG/DL (ref 65–100)
HGB BLD-MCNC: 10.1 G/DL (ref 11.5–16)
POTASSIUM SERPL-SCNC: 4.1 MMOL/L (ref 3.5–5.1)
SODIUM SERPL-SCNC: 136 MMOL/L (ref 136–145)

## 2023-05-02 PROCEDURE — 97165 OT EVAL LOW COMPLEX 30 MIN: CPT

## 2023-05-02 PROCEDURE — 97116 GAIT TRAINING THERAPY: CPT

## 2023-05-02 PROCEDURE — 36415 COLL VENOUS BLD VENIPUNCTURE: CPT

## 2023-05-02 PROCEDURE — 80048 BASIC METABOLIC PNL TOTAL CA: CPT

## 2023-05-02 PROCEDURE — 74011000250 HC RX REV CODE- 250: Performed by: PHYSICIAN ASSISTANT

## 2023-05-02 PROCEDURE — 74011250636 HC RX REV CODE- 250/636: Performed by: PHYSICIAN ASSISTANT

## 2023-05-02 PROCEDURE — 74011250637 HC RX REV CODE- 250/637: Performed by: PHYSICIAN ASSISTANT

## 2023-05-02 PROCEDURE — 97535 SELF CARE MNGMENT TRAINING: CPT

## 2023-05-02 PROCEDURE — 85018 HEMOGLOBIN: CPT

## 2023-05-02 RX ORDER — TRAMADOL HYDROCHLORIDE 50 MG/1
50 TABLET ORAL
Qty: 40 TABLET | Refills: 0 | Status: SHIPPED | OUTPATIENT
Start: 2023-05-02 | End: 2023-05-16

## 2023-05-02 RX ORDER — MELOXICAM 7.5 MG/1
7.5 TABLET ORAL DAILY
Qty: 30 TABLET | Refills: 0 | Status: SHIPPED | OUTPATIENT
Start: 2023-05-02 | End: 2023-06-01

## 2023-05-02 RX ORDER — OXYCODONE HYDROCHLORIDE 5 MG/1
5 TABLET ORAL
Qty: 42 TABLET | Refills: 0 | Status: SHIPPED | OUTPATIENT
Start: 2023-05-02 | End: 2023-05-09

## 2023-05-02 RX ORDER — AMOXICILLIN 250 MG
1 CAPSULE ORAL 2 TIMES DAILY
Qty: 60 TABLET | Refills: 0 | Status: SHIPPED | OUTPATIENT
Start: 2023-05-02 | End: 2023-06-01

## 2023-05-02 RX ORDER — NALOXONE HYDROCHLORIDE 4 MG/.1ML
SPRAY NASAL
Qty: 1 EACH | Refills: 0 | Status: SHIPPED | OUTPATIENT
Start: 2023-05-02

## 2023-05-02 RX ORDER — FAMOTIDINE 20 MG/1
20 TABLET, FILM COATED ORAL 2 TIMES DAILY
Qty: 60 TABLET | Refills: 0 | Status: SHIPPED | OUTPATIENT
Start: 2023-05-02 | End: 2023-06-01

## 2023-05-02 RX ORDER — ASPIRIN 81 MG/1
81 TABLET ORAL 2 TIMES DAILY
Qty: 60 TABLET | Refills: 0 | Status: SHIPPED | OUTPATIENT
Start: 2023-05-02 | End: 2023-06-01

## 2023-05-02 RX ADMIN — ACETAMINOPHEN 650 MG: 325 TABLET ORAL at 05:38

## 2023-05-02 RX ADMIN — LEVOTHYROXINE, LIOTHYRONINE 90 MG: 38; 9 TABLET ORAL at 09:30

## 2023-05-02 RX ADMIN — CEFAZOLIN 2 G: 1 INJECTION, POWDER, FOR SOLUTION INTRAMUSCULAR; INTRAVENOUS at 00:00

## 2023-05-02 RX ADMIN — OXYCODONE 5 MG: 5 TABLET ORAL at 09:29

## 2023-05-02 RX ADMIN — BACLOFEN 5 MG: 10 TABLET ORAL at 09:28

## 2023-05-02 RX ADMIN — ASPIRIN 81 MG: 81 TABLET, COATED ORAL at 09:28

## 2023-05-02 RX ADMIN — KETOROLAC TROMETHAMINE 15 MG: 30 INJECTION, SOLUTION INTRAMUSCULAR; INTRAVENOUS at 00:12

## 2023-05-02 RX ADMIN — FAMOTIDINE 20 MG: 20 TABLET ORAL at 09:29

## 2023-05-02 RX ADMIN — PREGABALIN 25 MG: 25 CAPSULE ORAL at 09:29

## 2023-05-02 RX ADMIN — KETOROLAC TROMETHAMINE 15 MG: 30 INJECTION, SOLUTION INTRAMUSCULAR; INTRAVENOUS at 05:38

## 2023-05-02 RX ADMIN — SODIUM CHLORIDE, PRESERVATIVE FREE 10 ML: 5 INJECTION INTRAVENOUS at 05:40

## 2023-05-02 RX ADMIN — BUDESONIDE 3 MG: 3 CAPSULE, GELATIN COATED ORAL at 09:57

## 2023-05-22 RX ORDER — LEVOTHYROXINE AND LIOTHYRONINE 57; 13.5 UG/1; UG/1
90 TABLET ORAL DAILY
COMMUNITY

## 2023-05-22 RX ORDER — BACLOFEN 10 MG/1
5 TABLET ORAL 3 TIMES DAILY
COMMUNITY
Start: 2023-01-30 | End: 2023-06-05 | Stop reason: SDUPTHER

## 2023-05-22 RX ORDER — BUDESONIDE 3 MG/1
3 CAPSULE, COATED PELLETS ORAL DAILY
COMMUNITY
Start: 2022-01-03

## 2023-05-22 RX ORDER — FAMOTIDINE 20 MG/1
20 TABLET, FILM COATED ORAL 2 TIMES DAILY
Qty: 60 TABLET | Refills: 0 | COMMUNITY
Start: 2023-05-02 | End: 2023-05-30

## 2023-05-22 RX ORDER — MELOXICAM 7.5 MG/1
7.5 TABLET ORAL DAILY
Qty: 30 TABLET | Refills: 0 | COMMUNITY
Start: 2023-05-02 | End: 2023-06-01

## 2023-05-22 RX ORDER — AMOXICILLIN 250 MG
1 CAPSULE ORAL 2 TIMES DAILY
Qty: 60 TABLET | Refills: 0 | COMMUNITY
Start: 2023-05-02 | End: 2023-06-01

## 2023-05-22 RX ORDER — ASPIRIN 81 MG/1
81 TABLET ORAL 2 TIMES DAILY
Qty: 60 TABLET | Refills: 0 | COMMUNITY
Start: 2023-05-02 | End: 2023-06-01

## 2023-05-22 RX ORDER — PREGABALIN 25 MG/1
25 CAPSULE ORAL 3 TIMES DAILY
COMMUNITY
Start: 2023-02-23

## 2023-05-22 RX ORDER — NALOXONE HYDROCHLORIDE 4 MG/.1ML
SPRAY NASAL
COMMUNITY
Start: 2023-05-02 | End: 2023-05-30

## 2023-05-22 RX ORDER — ATORVASTATIN CALCIUM 10 MG/1
10 TABLET, FILM COATED ORAL EVERY EVENING
COMMUNITY
Start: 2020-06-30

## 2023-05-22 RX ORDER — BUTALBITAL, ACETAMINOPHEN AND CAFFEINE 50; 325; 40 MG/1; MG/1; MG/1
TABLET ORAL
COMMUNITY
Start: 2023-04-05

## 2023-07-31 ENCOUNTER — TELEMEDICINE (OUTPATIENT)
Age: 78
End: 2023-07-31
Payer: MEDICARE

## 2023-07-31 DIAGNOSIS — G43.719 CHRONIC MIGRAINE WITHOUT AURA, INTRACTABLE, WITHOUT STATUS MIGRAINOSUS: ICD-10-CM

## 2023-07-31 DIAGNOSIS — M54.81 BILATERAL OCCIPITAL NEURALGIA: Primary | ICD-10-CM

## 2023-07-31 PROCEDURE — G8419 CALC BMI OUT NRM PARAM NOF/U: HCPCS | Performed by: PSYCHIATRY & NEUROLOGY

## 2023-07-31 PROCEDURE — 99214 OFFICE O/P EST MOD 30 MIN: CPT | Performed by: PSYCHIATRY & NEUROLOGY

## 2023-07-31 PROCEDURE — G8400 PT W/DXA NO RESULTS DOC: HCPCS | Performed by: PSYCHIATRY & NEUROLOGY

## 2023-07-31 PROCEDURE — G8427 DOCREV CUR MEDS BY ELIG CLIN: HCPCS | Performed by: PSYCHIATRY & NEUROLOGY

## 2023-07-31 PROCEDURE — 1090F PRES/ABSN URINE INCON ASSESS: CPT | Performed by: PSYCHIATRY & NEUROLOGY

## 2023-07-31 PROCEDURE — 1123F ACP DISCUSS/DSCN MKR DOCD: CPT | Performed by: PSYCHIATRY & NEUROLOGY

## 2023-07-31 PROCEDURE — 1036F TOBACCO NON-USER: CPT | Performed by: PSYCHIATRY & NEUROLOGY

## 2023-07-31 RX ORDER — ASPIRIN 81 MG/1
TABLET ORAL
Qty: 180 TABLET | OUTPATIENT
Start: 2023-07-31

## 2023-07-31 RX ORDER — FAMOTIDINE 20 MG/1
TABLET, FILM COATED ORAL
Qty: 180 TABLET | OUTPATIENT
Start: 2023-07-31

## 2023-07-31 ASSESSMENT — PATIENT HEALTH QUESTIONNAIRE - PHQ9
SUM OF ALL RESPONSES TO PHQ QUESTIONS 1-9: 0
SUM OF ALL RESPONSES TO PHQ QUESTIONS 1-9: 0
SUM OF ALL RESPONSES TO PHQ9 QUESTIONS 1 & 2: 0
1. LITTLE INTEREST OR PLEASURE IN DOING THINGS: 0
SUM OF ALL RESPONSES TO PHQ QUESTIONS 1-9: 0
2. FEELING DOWN, DEPRESSED OR HOPELESS: 0
SUM OF ALL RESPONSES TO PHQ QUESTIONS 1-9: 0

## 2023-07-31 NOTE — PATIENT INSTRUCTIONS
As per discussion overall you are doing well  Continue Emgality monthly  Keep track of your prior authorization the pharmacy will know when that expires and then let me know about a month prior to that so we can make sure that there is no interruption of therapy    Continue the butalbital as needed and continue to follow-up with your pain management team    Keep the appointment Dr. Fermin Kinsey in September unless you are doing really well then go ahead and cancel it a few days before    And I will set you up to see me back toward the end of February early March 2024 which is unfortunately my next available appointment.   I will do it virtually but if something changes please convert that to an in office visit

## 2023-07-31 NOTE — PROGRESS NOTES
[address]  VV FOLLOW-UP VISIT     Joann Stevens, was evaluated through a synchronous (real-time) audio-video encounter. The patient (or guardian if applicable) is aware that this is a billable service, which includes applicable co-pays. This Virtual Visit was conducted with patient's (and/or legal guardian's) consent. Patient identification was verified, and a caregiver was present when appropriate. The patient was located at Home: [address] [facility] [id number]  Provider was located at Facility (Appt Dept): [address] Francisca Stevens is a 69 y.o.  female who presents today for the following:  Chief Complaint   Patient presents with    Migraine     No migraine since starting medication   Occipital neuralgia \" will always have\"           ASSESSMENT AND PLAN  1. Bilateral occipital neuralgia  Assessment & Plan:   Reasonably well-controlled for patient although clearly not at goal  Continue on baclofen 5 mg twice a day and Lyrica 25 mg 3 times a day  For rescue medicine she can use butalbital as needed  Continue to follow-up with pain management with Dr. Lala Swan for continued occipital nerve blocks as needed  2. Chronic migraine without aura, intractable, without status migrainosus  Assessment & Plan:  Since starting CGRP there is been a reduction in frequency intensity and duration as well as a reduction in rescue medication utilization and a reduction in this time at work/personal life    Continue Emgality monthly    Rescue medication butalbital as needed        Return in about 6 months (around 1/31/2024) for VV, Or next available after that time. Patient and/or family was given time to ask questions and voice concerns. I believe all questions concerns were adequately addressed at this  office visit.   Patient and/or family also verbalized agreement and understanding of the above-stated plan    Complex neurologic

## 2023-07-31 NOTE — ASSESSMENT & PLAN NOTE
Reasonably well-controlled for patient although clearly not at goal  Continue on baclofen 5 mg twice a day and Lyrica 25 mg 3 times a day  For rescue medicine she can use butalbital as needed  Continue to follow-up with pain management with Dr. Ritu Sellers for continued occipital nerve blocks as needed

## 2023-07-31 NOTE — PROGRESS NOTES
Chief Complaint   Patient presents with    Migraine     No migraine since starting medication   Occipital neuralgia \" will always have\"     1. Have you been to the ER, urgent care clinic since your last visit? Hospitalized since your last visit? No     2. Have you seen or consulted any other health care providers outside of the [address] [place] [address] Avenue since your last visit? Include any pap smears or colon screening.   No

## 2023-07-31 NOTE — ASSESSMENT & PLAN NOTE
Since starting CGRP there is been a reduction in frequency intensity and duration as well as a reduction in rescue medication utilization and a reduction in this time at work/personal life    Continue Emgality monthly    Rescue medication butalbital as needed

## 2023-09-16 ENCOUNTER — PATIENT MESSAGE (OUTPATIENT)
Age: 78
End: 2023-09-16

## 2023-09-16 DIAGNOSIS — M54.81 BILATERAL OCCIPITAL NEURALGIA: Primary | ICD-10-CM

## 2023-09-20 RX ORDER — PREGABALIN 25 MG/1
25 CAPSULE ORAL 3 TIMES DAILY
Qty: 270 CAPSULE | Refills: 1 | Status: SHIPPED | OUTPATIENT
Start: 2023-09-20 | End: 2024-03-18

## 2023-09-20 NOTE — TELEPHONE ENCOUNTER
From: Shaggy Madden  To: Hoa Ulrich  Sent: 9/16/2023 11:54 AM EDT  Subject: Lyrica    I need a new prescription for Lyrica, 25mg 1 cap 3 times a day, sent to [facility].    Leon Segundo

## 2023-09-22 RX ORDER — BACLOFEN 5 MG/1
5 TABLET ORAL 3 TIMES DAILY
Qty: 90 TABLET | Refills: 5 | Status: SHIPPED | OUTPATIENT
Start: 2023-09-22

## 2024-01-18 DIAGNOSIS — G43.719 CHRONIC MIGRAINE WITHOUT AURA, INTRACTABLE, WITHOUT STATUS MIGRAINOSUS: Primary | ICD-10-CM

## 2024-01-18 RX ORDER — GALCANEZUMAB 120 MG/ML
INJECTION, SOLUTION SUBCUTANEOUS
Qty: 3 ML | Refills: 3 | Status: SHIPPED | OUTPATIENT
Start: 2024-01-18

## 2024-01-18 NOTE — TELEPHONE ENCOUNTER
Last office visit 07/31/2023  Next office visit 02/29/2024  Last med refill 05/03/2023    No information on medication dose, dispense, route, refills in current or old EMR system.

## 2024-02-29 ENCOUNTER — TELEMEDICINE (OUTPATIENT)
Age: 79
End: 2024-02-29
Payer: MEDICARE

## 2024-02-29 DIAGNOSIS — M54.81 BILATERAL OCCIPITAL NEURALGIA: ICD-10-CM

## 2024-02-29 DIAGNOSIS — G43.719 CHRONIC MIGRAINE WITHOUT AURA, INTRACTABLE, WITHOUT STATUS MIGRAINOSUS: Primary | ICD-10-CM

## 2024-02-29 PROCEDURE — G8484 FLU IMMUNIZE NO ADMIN: HCPCS | Performed by: PSYCHIATRY & NEUROLOGY

## 2024-02-29 PROCEDURE — G8400 PT W/DXA NO RESULTS DOC: HCPCS | Performed by: PSYCHIATRY & NEUROLOGY

## 2024-02-29 PROCEDURE — 99214 OFFICE O/P EST MOD 30 MIN: CPT | Performed by: PSYCHIATRY & NEUROLOGY

## 2024-02-29 PROCEDURE — 1036F TOBACCO NON-USER: CPT | Performed by: PSYCHIATRY & NEUROLOGY

## 2024-02-29 PROCEDURE — G8427 DOCREV CUR MEDS BY ELIG CLIN: HCPCS | Performed by: PSYCHIATRY & NEUROLOGY

## 2024-02-29 PROCEDURE — 1090F PRES/ABSN URINE INCON ASSESS: CPT | Performed by: PSYCHIATRY & NEUROLOGY

## 2024-02-29 PROCEDURE — 1123F ACP DISCUSS/DSCN MKR DOCD: CPT | Performed by: PSYCHIATRY & NEUROLOGY

## 2024-02-29 PROCEDURE — G8419 CALC BMI OUT NRM PARAM NOF/U: HCPCS | Performed by: PSYCHIATRY & NEUROLOGY

## 2024-02-29 ASSESSMENT — PATIENT HEALTH QUESTIONNAIRE - PHQ9
5. POOR APPETITE OR OVEREATING: 0
4. FEELING TIRED OR HAVING LITTLE ENERGY: 0
SUM OF ALL RESPONSES TO PHQ QUESTIONS 1-9: 0
3. TROUBLE FALLING OR STAYING ASLEEP: 0
10. IF YOU CHECKED OFF ANY PROBLEMS, HOW DIFFICULT HAVE THESE PROBLEMS MADE IT FOR YOU TO DO YOUR WORK, TAKE CARE OF THINGS AT HOME, OR GET ALONG WITH OTHER PEOPLE: 0
1. LITTLE INTEREST OR PLEASURE IN DOING THINGS: 0
SUM OF ALL RESPONSES TO PHQ QUESTIONS 1-9: 0
9. THOUGHTS THAT YOU WOULD BE BETTER OFF DEAD, OR OF HURTING YOURSELF: 0
2. FEELING DOWN, DEPRESSED OR HOPELESS: 0
SUM OF ALL RESPONSES TO PHQ QUESTIONS 1-9: 0
SUM OF ALL RESPONSES TO PHQ9 QUESTIONS 1 & 2: 0
SUM OF ALL RESPONSES TO PHQ QUESTIONS 1-9: 0
8. MOVING OR SPEAKING SO SLOWLY THAT OTHER PEOPLE COULD HAVE NOTICED. OR THE OPPOSITE, BEING SO FIGETY OR RESTLESS THAT YOU HAVE BEEN MOVING AROUND A LOT MORE THAN USUAL: 0
7. TROUBLE CONCENTRATING ON THINGS, SUCH AS READING THE NEWSPAPER OR WATCHING TELEVISION: 0
6. FEELING BAD ABOUT YOURSELF - OR THAT YOU ARE A FAILURE OR HAVE LET YOURSELF OR YOUR FAMILY DOWN: 0

## 2024-02-29 ASSESSMENT — ANXIETY QUESTIONNAIRES
4. TROUBLE RELAXING: 0
GAD7 TOTAL SCORE: 0
2. NOT BEING ABLE TO STOP OR CONTROL WORRYING: 0
3. WORRYING TOO MUCH ABOUT DIFFERENT THINGS: 0
5. BEING SO RESTLESS THAT IT IS HARD TO SIT STILL: 0
1. FEELING NERVOUS, ANXIOUS, OR ON EDGE: 0
6. BECOMING EASILY ANNOYED OR IRRITABLE: 0
IF YOU CHECKED OFF ANY PROBLEMS ON THIS QUESTIONNAIRE, HOW DIFFICULT HAVE THESE PROBLEMS MADE IT FOR YOU TO DO YOUR WORK, TAKE CARE OF THINGS AT HOME, OR GET ALONG WITH OTHER PEOPLE: NOT DIFFICULT AT ALL
7. FEELING AFRAID AS IF SOMETHING AWFUL MIGHT HAPPEN: 0

## 2024-02-29 NOTE — ASSESSMENT & PLAN NOTE
Reasonably well-controlled for the patient continue on Lyrica 25 mg 1 tablet 3 times a day  She is no longer taking baclofen  For rescue medicine she can use butalbital as needed  Continue to follow-up with pain management  for continued occipital nerve blocks as needed

## 2024-02-29 NOTE — PROGRESS NOTES
[facility]  [facility]  [address]  [place] [address]  Phone: [phone] fax: [phone]      Cinthia Cadena, was evaluated through a synchronous (real-time) audio-video encounter. The patient (or guardian if applicable) is aware that this is a billable service, which includes applicable co-pays. This Virtual Visit was conducted with patient's (and/or legal guardian's) consent. Patient identification was verified, and a caregiver was present when appropriate.   The patient was located at Home: [address] [place] [id number]  Provider was located at Facility (Appt Dept): [address]  [address]  [place] [address]      Cinthia Cadena (:  1945) is a Established patient, presenting virtually for evaluation of the following:  Chief Complaint   Patient presents with    Migraine         Assessment & Plan   Below is the assessment and plan developed based on review of pertinent history, physical exam, labs, studies, and medications.  1. Chronic migraine without aura, intractable, without status migrainosus  Assessment & Plan:  Since starting CGRP there is been a reduction in frequency intensity and duration as well as a reduction in rescue medication utilization and a reduction in this time at work/personal life    Continue Emgality monthly    Rescue medication butalbital as needed  2. Bilateral occipital neuralgia  Assessment & Plan:  Reasonably well-controlled for the patient continue on Lyrica 25 mg 1 tablet 3 times a day  She is no longer taking baclofen  For rescue medicine she can use butalbital as needed  Continue to follow-up with pain management  for continued occipital nerve blocks as needed    Return in about 6 months (around 2024) for In office.       Subjective   HPI  Historical Data  Patient is known to the practice and has been previously seen by Dr. Valdes     Neurologic diagnosis  Headaches and

## 2024-02-29 NOTE — PROGRESS NOTES
Cinthia Cadena is a 79 y.o. female  HIPAA verified by two patient identifiers.   Chief Complaint   Patient presents with    Migraine     There were no vitals taken for this visit.    Pain Scale:0 /10  Pain Location:   1. Have you been to the ER, urgent care clinic since your last visit?  Hospitalized since your last visit?No    2. Have you seen or consulted any other health care providers outside of the [facility] since your last visit?  Include any pap smears or colon screening. No

## 2024-03-27 ENCOUNTER — TELEPHONE (OUTPATIENT)
Age: 79
End: 2024-03-27

## 2024-05-09 ENCOUNTER — TELEPHONE (OUTPATIENT)
Age: 79
End: 2024-05-09

## 2024-05-09 NOTE — TELEPHONE ENCOUNTER
Needs [facility] PA to [facility]    Forwarding to [facility] with PA dept.

## 2024-05-09 NOTE — TELEPHONE ENCOUNTER
Cinthia Kennedy [facility] (supporting Rosita Vivar ANP)10 minutes ago (9:13 AM)       I just tried to get my  new Emgality prescription filled but received notice from [employer] they do not have the Prior Authorization approval. It was my understanding from several weeks ago this was done?  My next injection would be May 23rd.   Cinthia

## 2024-05-10 ENCOUNTER — TELEPHONE (OUTPATIENT)
Age: 79
End: 2024-05-10

## 2024-05-10 NOTE — TELEPHONE ENCOUNTER
Emgality sent via CM to [employer]    [facility]     [id number]     Approved CaseId:[id number];Status:Approved;Review Type:Prior Auth;Coverage Start Date:04/10/2024;Coverage End Date:05/10/2025;     Rt fax to [employer]   [phone]

## 2024-06-07 DIAGNOSIS — G44.86 CERVICOGENIC HEADACHE: Primary | ICD-10-CM

## 2024-06-07 RX ORDER — BACLOFEN 5 MG/1
5 TABLET ORAL 3 TIMES DAILY
Qty: 270 TABLET | Refills: 3 | Status: SHIPPED | OUTPATIENT
Start: 2024-06-07

## 2024-06-07 NOTE — TELEPHONE ENCOUNTER
Gilberto Becker,  I need a new prescription for Baclofen. I let it  before I could refill. Send to [facility] in [facility].     Received by my chart patient advise  Last office visit 2024  Next office visit 9/10/2024  Last med refill 2024

## 2024-07-22 RX ORDER — BUTALBITAL, ACETAMINOPHEN AND CAFFEINE 50; 325; 40 MG/1; MG/1; MG/1
TABLET ORAL
Qty: 50 TABLET | Refills: 0 | Status: SHIPPED | OUTPATIENT
Start: 2024-07-22

## 2024-09-10 ENCOUNTER — TELEMEDICINE (OUTPATIENT)
Age: 79
End: 2024-09-10
Payer: MEDICARE

## 2024-09-10 DIAGNOSIS — G43.719 CHRONIC MIGRAINE WITHOUT AURA, INTRACTABLE, WITHOUT STATUS MIGRAINOSUS: Primary | ICD-10-CM

## 2024-09-10 DIAGNOSIS — M54.81 BILATERAL OCCIPITAL NEURALGIA: ICD-10-CM

## 2024-09-10 PROCEDURE — G8400 PT W/DXA NO RESULTS DOC: HCPCS | Performed by: PSYCHIATRY & NEUROLOGY

## 2024-09-10 PROCEDURE — 1090F PRES/ABSN URINE INCON ASSESS: CPT | Performed by: PSYCHIATRY & NEUROLOGY

## 2024-09-10 PROCEDURE — G8420 CALC BMI NORM PARAMETERS: HCPCS | Performed by: PSYCHIATRY & NEUROLOGY

## 2024-09-10 PROCEDURE — 99214 OFFICE O/P EST MOD 30 MIN: CPT | Performed by: PSYCHIATRY & NEUROLOGY

## 2024-09-10 PROCEDURE — G8427 DOCREV CUR MEDS BY ELIG CLIN: HCPCS | Performed by: PSYCHIATRY & NEUROLOGY

## 2024-09-10 PROCEDURE — 1123F ACP DISCUSS/DSCN MKR DOCD: CPT | Performed by: PSYCHIATRY & NEUROLOGY

## 2024-09-10 PROCEDURE — 1036F TOBACCO NON-USER: CPT | Performed by: PSYCHIATRY & NEUROLOGY

## 2024-09-10 ASSESSMENT — PATIENT HEALTH QUESTIONNAIRE - PHQ9
SUM OF ALL RESPONSES TO PHQ QUESTIONS 1-9: 0
SUM OF ALL RESPONSES TO PHQ QUESTIONS 1-9: 0
2. FEELING DOWN, DEPRESSED OR HOPELESS: NOT AT ALL
SUM OF ALL RESPONSES TO PHQ9 QUESTIONS 1 & 2: 0
SUM OF ALL RESPONSES TO PHQ QUESTIONS 1-9: 0
SUM OF ALL RESPONSES TO PHQ QUESTIONS 1-9: 0

## 2024-11-05 DIAGNOSIS — G44.86 CERVICOGENIC HEADACHE: Primary | ICD-10-CM

## 2024-11-05 DIAGNOSIS — M54.81 BILATERAL OCCIPITAL NEURALGIA: ICD-10-CM

## 2024-11-06 RX ORDER — BUTALBITAL, ACETAMINOPHEN AND CAFFEINE 50; 325; 40 MG/1; MG/1; MG/1
2 TABLET ORAL
Qty: 50 TABLET | Refills: 0 | Status: SHIPPED | OUTPATIENT
Start: 2024-11-06

## 2024-12-06 PROBLEM — M17.11 ARTHRITIS OF RIGHT KNEE: Status: ACTIVE | Noted: 2024-12-06

## 2025-01-28 DIAGNOSIS — G43.719 CHRONIC MIGRAINE WITHOUT AURA, INTRACTABLE, WITHOUT STATUS MIGRAINOSUS: ICD-10-CM

## 2025-01-29 RX ORDER — GALCANEZUMAB 120 MG/ML
120 INJECTION, SOLUTION SUBCUTANEOUS
Qty: 3 ML | Refills: 3 | Status: ACTIVE | OUTPATIENT
Start: 2025-01-29 | End: 2026-01-29

## 2025-01-29 NOTE — TELEPHONE ENCOUNTER
Chronic Respiratory Failure on 3 liters of Oxygen at home - COPD, BHAVANI/OHS with severe restrictive lung disease.      She presented with SOB 3 days PTA.  Placed on BiPAP and given Lasix 80mg IV x 1 in ED.  Transferred to ICU on admission.  Started initially on Lasix 40mg IV q12 and increased to 80mg q12.  Started on IV Vanc, Flagyl, and Cefepime on 10/15/2020 by Pulmonary-CC given CT Chest concerning for infection - antibiotics stopped 10/18/2020.  Respiratory culture with normal respiratory leonardo on 10/16/2020.  Given cavitary appearance on imaging, Quantiferon gold sent and indeterminate x 2  with Sputum AFB stain negative and  Culture pending x 1 on 10/16/2020.  Transferred out of ICU on 10/21/2020.  Awaiting Trilogy for home.   More short of breath on 10/30/2020 - CXR overall stable from 2 days ago, but BNP in 400s from 200s - given an additional dose of Lasix 40mg IV with improvement in symptoms.    I/O not well recorded today - net cumulative output of 1930ml since admission  Weight 103.4kg on admission and down to 97.5kg on 10/31/2020.  On 11/1/2020, she is desating off BiPAP, although she does not complain of increased SOB.  Lung exam with crackles, but no wheezing heard.       CXR on 10/13/2020 -  Interval worsening of diffuse bilateral airspace disease and moderate bilateral pleural effusions when compared to 09/09/2020  CT Chest Without on 10/15/2020 - The findings highly concerning for extensive diffuse pulmonary infection with bilateral pleural fluid collections right greater than left.  US of LEs on 10/15/2020 - No evidence of deep venous thrombosis in either lower extremity.  CXR Portable on 10/20/2020 - No significant change.  There is cardiomegaly, multifocal airspace opacities and bilateral effusions.  CXR Protable on 10/28/2020 - Worsening right basilar atelectasis or consolidation.  Right upper lobe consolidation is improved.  There may be a tiny pneumatocele or central cavitation.  Pleural  Last office visit 9/10/2024  Next office visit 9/11/2025  Last med refill 1/18/2024   effusions are improved.  Continued follow-up advised.  CXR on 10/30/2020 - Stable right upper lobe consolidation.  Findings remain concerning for pneumonia with small cavitation as described previously.  Improved aeration right lung base.  Small pleural effusions, decreased in volume on the right.     Labs on Admission:  WBC 9.22  COVID-19 rapid negative  Trop negative x 3     PH 7.227/pCO2 94.4/pO2 73  Procal normal  Lactic Acid normal  Quantiferon indeterminate x 2  Sputum AFB stain negative and culture pending x 1     Plan:  Change Lasix back to 40mg IV bid  Change DuoNebs to standing q6  ABG  Continue BiPAP at night - insurance verbally approved Trilogy (appeal done by Pulmonary) for home use and awaiting confirmation  Strict I&Os  Fluid Restrict to 1.5L   Consider empiric steroids and re-consulting Pulmonary

## 2025-02-03 ENCOUNTER — HOSPITAL ENCOUNTER (OUTPATIENT)
Facility: HOSPITAL | Age: 80
Discharge: HOME | End: 2025-02-06
Payer: MEDICARE

## 2025-02-03 VITALS
WEIGHT: 110.4 LBS | TEMPERATURE: 98 F | HEIGHT: 65 IN | DIASTOLIC BLOOD PRESSURE: 72 MMHG | SYSTOLIC BLOOD PRESSURE: 144 MMHG | RESPIRATION RATE: 20 BRPM | BODY MASS INDEX: 18.39 KG/M2 | HEART RATE: 66 BPM

## 2025-02-03 LAB
ABO + RH BLD: NORMAL
ANION GAP SERPL CALC-SCNC: 8 MMOL/L (ref 2–12)
APPEARANCE UR: CLEAR
BACTERIA URNS QL MICRO: ABNORMAL /HPF
BASOPHILS # BLD: 0.03 K/UL (ref 0–0.1)
BASOPHILS NFR BLD: 0.6 % (ref 0–1)
BILIRUB UR QL: NEGATIVE
BLOOD GROUP ANTIBODIES SERPL: NORMAL
BUN SERPL-MCNC: 10 MG/DL (ref 6–20)
BUN/CREAT SERPL: 14 (ref 12–20)
CALCIUM SERPL-MCNC: 10 MG/DL (ref 8.5–10.1)
CHLORIDE SERPL-SCNC: 98 MMOL/L (ref 97–108)
CO2 SERPL-SCNC: 29 MMOL/L (ref 21–32)
COLOR UR: ABNORMAL
CREAT SERPL-MCNC: 0.72 MG/DL (ref 0.55–1.02)
DIFFERENTIAL METHOD BLD: NORMAL
EKG ATRIAL RATE: 65 BPM
EKG DIAGNOSIS: NORMAL
EKG P AXIS: 72 DEGREES
EKG P-R INTERVAL: 180 MS
EKG Q-T INTERVAL: 412 MS
EKG QRS DURATION: 92 MS
EKG QTC CALCULATION (BAZETT): 428 MS
EKG R AXIS: -47 DEGREES
EKG T AXIS: 50 DEGREES
EKG VENTRICULAR RATE: 65 BPM
EOSINOPHIL # BLD: 0.08 K/UL (ref 0–0.4)
EOSINOPHIL NFR BLD: 1.7 % (ref 0–7)
EPITH CASTS URNS QL MICRO: ABNORMAL /LPF
ERYTHROCYTE [DISTWIDTH] IN BLOOD BY AUTOMATED COUNT: 13.2 % (ref 11.5–14.5)
EST. AVERAGE GLUCOSE BLD GHB EST-MCNC: 114 MG/DL
GLUCOSE SERPL-MCNC: 97 MG/DL (ref 65–100)
GLUCOSE UR STRIP.AUTO-MCNC: NEGATIVE MG/DL
HBA1C MFR BLD: 5.6 % (ref 4–5.6)
HCT VFR BLD AUTO: 40.3 % (ref 35–47)
HGB BLD-MCNC: 12.8 G/DL (ref 11.5–16)
HGB UR QL STRIP: NEGATIVE
HYALINE CASTS URNS QL MICRO: ABNORMAL /LPF (ref 0–5)
IMM GRANULOCYTES # BLD AUTO: 0.01 K/UL (ref 0–0.04)
IMM GRANULOCYTES NFR BLD AUTO: 0.2 % (ref 0–0.5)
INR PPP: 1 (ref 0.9–1.1)
KETONES UR QL STRIP.AUTO: NEGATIVE MG/DL
LEUKOCYTE ESTERASE UR QL STRIP.AUTO: ABNORMAL
LYMPHOCYTES # BLD: 0.96 K/UL (ref 0.8–3.5)
LYMPHOCYTES NFR BLD: 20.4 % (ref 12–49)
MCH RBC QN AUTO: 29 PG (ref 26–34)
MCHC RBC AUTO-ENTMCNC: 31.8 G/DL (ref 30–36.5)
MCV RBC AUTO: 91.2 FL (ref 80–99)
MONOCYTES # BLD: 0.37 K/UL (ref 0–1)
MONOCYTES NFR BLD: 7.9 % (ref 5–13)
NEUTS SEG # BLD: 3.26 K/UL (ref 1.8–8)
NEUTS SEG NFR BLD: 69.2 % (ref 32–75)
NITRITE UR QL STRIP.AUTO: NEGATIVE
NRBC # BLD: 0 K/UL (ref 0–0.01)
NRBC BLD-RTO: 0 PER 100 WBC
PH UR STRIP: 8.5 (ref 5–8)
PLATELET # BLD AUTO: 193 K/UL (ref 150–400)
PMV BLD AUTO: 9.4 FL (ref 8.9–12.9)
POTASSIUM SERPL-SCNC: 3.7 MMOL/L (ref 3.5–5.1)
PROT UR STRIP-MCNC: NEGATIVE MG/DL
PROTHROMBIN TIME: 10.3 SEC (ref 9.2–11.2)
RBC # BLD AUTO: 4.42 M/UL (ref 3.8–5.2)
RBC #/AREA URNS HPF: ABNORMAL /HPF (ref 0–5)
SODIUM SERPL-SCNC: 135 MMOL/L (ref 136–145)
SP GR UR REFRACTOMETRY: 1.01 (ref 1–1.03)
SPECIMEN EXP DATE BLD: NORMAL
URINE CULTURE IF INDICATED: ABNORMAL
UROBILINOGEN UR QL STRIP.AUTO: 0.2 EU/DL (ref 0.2–1)
WBC # BLD AUTO: 4.7 K/UL (ref 3.6–11)
WBC URNS QL MICRO: ABNORMAL /HPF (ref 0–4)

## 2025-02-03 PROCEDURE — 36415 COLL VENOUS BLD VENIPUNCTURE: CPT

## 2025-02-03 PROCEDURE — APPNB30 APP NON BILLABLE TIME 0-30 MINS: Performed by: NURSE PRACTITIONER

## 2025-02-03 PROCEDURE — 81001 URINALYSIS AUTO W/SCOPE: CPT

## 2025-02-03 PROCEDURE — 86901 BLOOD TYPING SEROLOGIC RH(D): CPT

## 2025-02-03 PROCEDURE — 80048 BASIC METABOLIC PNL TOTAL CA: CPT

## 2025-02-03 PROCEDURE — 93005 ELECTROCARDIOGRAM TRACING: CPT | Performed by: ORTHOPAEDIC SURGERY

## 2025-02-03 PROCEDURE — 86850 RBC ANTIBODY SCREEN: CPT

## 2025-02-03 PROCEDURE — 85610 PROTHROMBIN TIME: CPT

## 2025-02-03 PROCEDURE — 83036 HEMOGLOBIN GLYCOSYLATED A1C: CPT

## 2025-02-03 PROCEDURE — 86900 BLOOD TYPING SEROLOGIC ABO: CPT

## 2025-02-03 PROCEDURE — 93010 ELECTROCARDIOGRAM REPORT: CPT | Performed by: SPECIALIST

## 2025-02-03 PROCEDURE — 85025 COMPLETE CBC W/AUTO DIFF WBC: CPT

## 2025-02-03 RX ORDER — AMLODIPINE BESYLATE 5 MG/1
5 TABLET ORAL DAILY
COMMUNITY

## 2025-02-03 RX ORDER — VITAMIN E 268 MG
400 CAPSULE ORAL DAILY
COMMUNITY

## 2025-02-03 RX ORDER — UBIDECARENONE 30 MG
CAPSULE ORAL DAILY
COMMUNITY

## 2025-02-03 RX ORDER — M-VIT,TX,IRON,MINS/CALC/FOLIC 27MG-0.4MG
1 TABLET ORAL DAILY
COMMUNITY

## 2025-02-03 ASSESSMENT — KOOS JR
RISING FROM SITTING: MODERATE
TWISING OR PIVOTING ON KNEE: SEVERE
GOING UP OR DOWN STAIRS: MODERATE
BENDING TO THE FLOOR TO PICK UP OBJECT: MODERATE
STRAIGHTENING KNEE FULLY: MILD
HOW SEVERE IS YOUR KNEE STIFFNESS AFTER FIRST WAKING IN MORNING: MODERATE
KOOS JR TOTAL INTERVAL SCORE: 52.465
STANDING UPRIGHT: MODERATE

## 2025-02-03 ASSESSMENT — PROMIS GLOBAL HEALTH SCALE
HOW IS THE PROMIS V1.1 BEING ADMINISTERED?: PAPER
IN THE PAST 7 DAYS, HOW WOULD YOU RATE YOUR PAIN ON AVERAGE [ON A SCALE FROM 0 (NO PAIN) TO 10 (WORST IMAGINABLE PAIN)]?: 6
IN GENERAL, HOW WOULD YOU RATE YOUR PHYSICAL HEALTH [ON A SCALE OF 1 (POOR) TO 5 (EXCELLENT)]?: VERY GOOD
IN GENERAL, HOW WOULD YOU RATE YOUR SATISFACTION WITH YOUR SOCIAL ACTIVITIES AND RELATIONSHIPS [ON A SCALE OF 1 (POOR) TO 5 (EXCELLENT)]?: VERY GOOD
IN THE PAST 7 DAYS, HOW WOULD YOU RATE YOUR FATIGUE ON AVERAGE [ON A SCALE FROM 1 (NONE) TO 5 (VERY SEVERE)]?: MILD
IN GENERAL, WOULD YOU SAY YOUR HEALTH IS...[ON A SCALE OF 1 (POOR) TO 5 (EXCELLENT)]: VERY GOOD
IN THE PAST 7 DAYS, HOW OFTEN HAVE YOU BEEN BOTHERED BY EMOTIONAL PROBLEMS, SUCH AS FEELING ANXIOUS, DEPRESSED, OR IRRITABLE [ON A SCALE FROM 1 (NEVER) TO 5 (ALWAYS)]?: NEVER
TO WHAT EXTENT ARE YOU ABLE TO CARRY OUT YOUR EVERYDAY PHYSICAL ACTIVITIES SUCH AS WALKING, CLIMBING STAIRS, CARRYING GROCERIES, OR MOVING A CHAIR [ON A SCALE OF 1 (NOT AT ALL) TO 5 (COMPLETELY)]?: MOSTLY
IN GENERAL, WOULD YOU SAY YOUR QUALITY OF LIFE IS...[ON A SCALE OF 1 (POOR) TO 5 (EXCELLENT)]: VERY GOOD
IN GENERAL, PLEASE RATE HOW WELL YOU CARRY OUT YOUR USUAL SOCIAL ACTIVITIES (INCLUDES ACTIVITIES AT HOME, AT WORK, AND IN YOUR COMMUNITY, AND RESPONSIBILITIES AS A PARENT, CHILD, SPOUSE, EMPLOYEE, FRIEND, ETC) [ON A SCALE OF 1 (POOR) TO 5 (EXCELLENT)]?: VERY GOOD
SUM OF RESPONSES TO QUESTIONS 2, 4, 5, & 10: 17
SUM OF RESPONSES TO QUESTIONS 3, 6, 7, & 8: 18
IN GENERAL, HOW WOULD YOU RATE YOUR MENTAL HEALTH, INCLUDING YOUR MOOD AND YOUR ABILITY TO THINK [ON A SCALE OF 1 (POOR) TO 5 (EXCELLENT)]?: VERY GOOD
WHO IS THE PERSON COMPLETING THE PROMIS V1.1 SURVEY?: SELF

## 2025-02-03 ASSESSMENT — PAIN SCALES - GENERAL: PAINLEVEL_OUTOF10: 6

## 2025-02-03 ASSESSMENT — PAIN DESCRIPTION - ORIENTATION: ORIENTATION: RIGHT;LOWER

## 2025-02-03 ASSESSMENT — PAIN DESCRIPTION - LOCATION: LOCATION: BACK;KNEE

## 2025-02-03 ASSESSMENT — PAIN DESCRIPTION - DESCRIPTORS: DESCRIPTORS: ACHING

## 2025-02-03 NOTE — PERIOP NOTE
[address] [address]   MAIN OR                     [phone]    MAIN PRE OP             [phone]                                                                                AMBULATORY PRE OP          [phone]  PRE-ADMISSION TESTING    [phone]     Surgery Date:  2-19-25       Is surgery arrival time given by surgeon?  YES  NO    If “NO”, [facility] staff will call you between 4 and 7pm the day before your surgery with your arrival time. (If your surgery is on a Monday, we will call you the Friday before.)    Call [phone] after 7pm Monday-Friday if you did not receive this call.    INSTRUCTIONS BEFORE YOUR SURGERY   When You  Arrive Arrive at [facility] Patient Access on 1st floor the day of your surgery.  Have your insurance card, photo ID,living will/advanced directive/POA (if applicable),  and any copayment (if needed)   Food   and   Drink NO solid food after midnight the night before surgery. You can drink clear liquids from midnight until ONE hour prior to your arrival at the hospital on the day of your surgery. Clear liquids include:  Water  Apple juice (no sediment)  Carbonated beverages  Black coffee(no cream/milk)  Tea(no cream/milk)  Gatorade    No alcohol (beer, wine, liquor) or marijuana (smoking) 24 hours, edibles (3 days). Stop smoking cigarettes 14 days before surgery (helps w/healing and breathing).   Medications to   TAKE   Morning of Surgery MEDICATIONS TO TAKE THE MORNING OF SURGERY WITH A SIP OF WATER: LYRICA, AMLODIPINE, ARMOUR THYROID    You may take these medications, IF NEEDED, the morning of surgery: NONE    Ask your surgeon/prescribing doctor for instructions on taking or stopping these medications prior to surgery: NONE   Medications to STOP  before surgery Non-Steroidal anti-inflammatory Drugs (NSAID's): for example, Diclofenac(Voltaren),  Ibuprofen (Advil, Motrin), Naproxen (Aleve) 3 days    STOP

## 2025-02-03 NOTE — PERIOP NOTE
ARAMIS AT THE [facility] CALLED FOR PATIENT  REFERRAL. SHE STATES SHE WILL CALL PATIENT THIS AFTERNOON.

## 2025-02-04 LAB
BACTERIA SPEC CULT: NORMAL
BACTERIA SPEC CULT: NORMAL
SERVICE CMNT-IMP: NORMAL

## 2025-02-04 NOTE — PERIOP NOTE
PAT Nurse Practitioner   Pre-Operative Chart Review/Assessment:-ORTHOPEDIC                Patient Name:  Cinthia Cadena                                                           Age:   79 y.o.    :  1945     Today's Date:  2025     Date of PAT:   2/3/25      Date of Surgery:    25      Procedure(s):  Right Unicondylar Knee Arthroplasty     Anesthesia:   Spinal     Surgeon:   Dr. Hogan                       PLAN:      1)  PCP:  Brian Vides MD      2)  Cardiac Clearance:  EKG and METs reviewed. No further cardiac evaluation requested. PAT EKG showed normal sinus rhythm and LAFB.         3)  Diabetic Treatment Consult:  Not indicated. A1c-5.6      4)  Sleep Apnea evaluation:   Not indicated. ZAK Score 2.       5) Treatment for MRSA/Staph Aureus:  Neg      6) Discharge Plan:  Home w/ spouse      7) Skin: Denies open wounds, cuts, sores, rashes or other areas of concern in PAT assessment.      8) Additional Concerns:  Former smoker, HTN, GERD, migraines      9) Cardiac/Diabetic Medication Recommendations Prior to Surgery:  take amlodipine DOS      Additional Orders for Day of Surgery:  T&S      Records Scanned in Epic:   None              Vital Signs:        Vitals:    25 1348   BP: (!) 144/72   Pulse: 66   Resp: 20   Temp: 98 °F (36.7 °C)             Body mass index is 18.37 kg/m².         ____________________________________________  PAST MEDICAL HISTORY  Past Medical History:   Diagnosis Date    Arthritis     Cancer (HCC)     Skin    Chronic pain     LEFT KNEE AND BACK, NECK AND HEAD    Colitis     Degenerative cervical spinal stenosis     Degenerative lumbar spinal stenosis     GERD (gastroesophageal reflux disease)     Hyperlipemia     Hypertension     Migraine     Occipital neuralgia of right side     Thyroid disease     Ulceration of vulva 2008    Wound of left leg 2017      ____________________________________________  PAST SURGICAL HISTORY  Past Surgical History:

## 2025-02-05 PROBLEM — Z01.818 ENCOUNTER FOR PREADMISSION TESTING: Status: ACTIVE | Noted: 2025-02-05

## 2025-02-19 ENCOUNTER — HOSPITAL ENCOUNTER (OUTPATIENT)
Facility: HOSPITAL | Age: 80
Setting detail: OBSERVATION
Discharge: HOME | End: 2025-02-20
Attending: ORTHOPAEDIC SURGERY | Admitting: ORTHOPAEDIC SURGERY
Payer: MEDICARE

## 2025-02-19 ENCOUNTER — ANESTHESIA (OUTPATIENT)
Facility: HOSPITAL | Age: 80
End: 2025-02-19
Payer: MEDICARE

## 2025-02-19 ENCOUNTER — ANESTHESIA EVENT (OUTPATIENT)
Facility: HOSPITAL | Age: 80
End: 2025-02-19
Payer: MEDICARE

## 2025-02-19 DIAGNOSIS — G43.719 CHRONIC MIGRAINE WITHOUT AURA, INTRACTABLE, WITHOUT STATUS MIGRAINOSUS: ICD-10-CM

## 2025-02-19 DIAGNOSIS — Z96.651 S/P TKR (TOTAL KNEE REPLACEMENT), RIGHT: Primary | ICD-10-CM

## 2025-02-19 DIAGNOSIS — G44.86 CERVICOGENIC HEADACHE: ICD-10-CM

## 2025-02-19 DIAGNOSIS — M54.81 BILATERAL OCCIPITAL NEURALGIA: ICD-10-CM

## 2025-02-19 PROBLEM — M17.11 LOCALIZED OSTEOARTHRITIS OF RIGHT KNEE: Status: ACTIVE | Noted: 2025-02-19

## 2025-02-19 LAB
ABO + RH BLD: NORMAL
BLOOD GROUP ANTIBODIES SERPL: NORMAL
GLUCOSE BLD STRIP.AUTO-MCNC: 150 MG/DL (ref 65–117)
GLUCOSE BLD STRIP.AUTO-MCNC: 87 MG/DL (ref 65–117)
SERVICE CMNT-IMP: ABNORMAL
SERVICE CMNT-IMP: NORMAL
SPECIMEN EXP DATE BLD: NORMAL

## 2025-02-19 PROCEDURE — 6360000002 HC RX W HCPCS: Performed by: PHYSICIAN ASSISTANT

## 2025-02-19 PROCEDURE — 2580000003 HC RX 258: Performed by: PHYSICIAN ASSISTANT

## 2025-02-19 PROCEDURE — C1776 JOINT DEVICE (IMPLANTABLE): HCPCS | Performed by: ORTHOPAEDIC SURGERY

## 2025-02-19 PROCEDURE — G0378 HOSPITAL OBSERVATION PER HR: HCPCS

## 2025-02-19 PROCEDURE — 2500000003 HC RX 250 WO HCPCS: Performed by: ORTHOPAEDIC SURGERY

## 2025-02-19 PROCEDURE — 6360000002 HC RX W HCPCS: Performed by: ANESTHESIOLOGY

## 2025-02-19 PROCEDURE — 86900 BLOOD TYPING SEROLOGIC ABO: CPT

## 2025-02-19 PROCEDURE — 2500000003 HC RX 250 WO HCPCS

## 2025-02-19 PROCEDURE — 97161 PT EVAL LOW COMPLEX 20 MIN: CPT

## 2025-02-19 PROCEDURE — 2580000003 HC RX 258

## 2025-02-19 PROCEDURE — 3700000000 HC ANESTHESIA ATTENDED CARE: Performed by: ORTHOPAEDIC SURGERY

## 2025-02-19 PROCEDURE — 2709999900 HC NON-CHARGEABLE SUPPLY: Performed by: ORTHOPAEDIC SURGERY

## 2025-02-19 PROCEDURE — 2580000003 HC RX 258: Performed by: ORTHOPAEDIC SURGERY

## 2025-02-19 PROCEDURE — 6360000002 HC RX W HCPCS

## 2025-02-19 PROCEDURE — 36415 COLL VENOUS BLD VENIPUNCTURE: CPT

## 2025-02-19 PROCEDURE — 6370000000 HC RX 637 (ALT 250 FOR IP): Performed by: PHYSICIAN ASSISTANT

## 2025-02-19 PROCEDURE — 6360000002 HC RX W HCPCS: Performed by: ORTHOPAEDIC SURGERY

## 2025-02-19 PROCEDURE — 86901 BLOOD TYPING SEROLOGIC RH(D): CPT

## 2025-02-19 PROCEDURE — 3700000001 HC ADD 15 MINUTES (ANESTHESIA): Performed by: ORTHOPAEDIC SURGERY

## 2025-02-19 PROCEDURE — 82962 GLUCOSE BLOOD TEST: CPT

## 2025-02-19 PROCEDURE — 3600000015 HC SURGERY LEVEL 5 ADDTL 15MIN: Performed by: ORTHOPAEDIC SURGERY

## 2025-02-19 PROCEDURE — 97116 GAIT TRAINING THERAPY: CPT

## 2025-02-19 PROCEDURE — 3600000005 HC SURGERY LEVEL 5 BASE: Performed by: ORTHOPAEDIC SURGERY

## 2025-02-19 PROCEDURE — 2500000003 HC RX 250 WO HCPCS: Performed by: PHYSICIAN ASSISTANT

## 2025-02-19 PROCEDURE — 2720000010 HC SURG SUPPLY STERILE: Performed by: ORTHOPAEDIC SURGERY

## 2025-02-19 PROCEDURE — 86850 RBC ANTIBODY SCREEN: CPT

## 2025-02-19 PROCEDURE — 7100000001 HC PACU RECOVERY - ADDTL 15 MIN: Performed by: ORTHOPAEDIC SURGERY

## 2025-02-19 PROCEDURE — 97530 THERAPEUTIC ACTIVITIES: CPT

## 2025-02-19 PROCEDURE — 2580000003 HC RX 258: Performed by: ANESTHESIOLOGY

## 2025-02-19 PROCEDURE — 7100000000 HC PACU RECOVERY - FIRST 15 MIN: Performed by: ORTHOPAEDIC SURGERY

## 2025-02-19 DEVICE — SMARTSET GHV GENTAMICIN HIGH VISCOSITY BONE CEMENT 40G
Type: IMPLANTABLE DEVICE | Site: KNEE | Status: FUNCTIONAL
Brand: SMARTSET

## 2025-02-19 DEVICE — SIGMA HIGH PERFORMANCE PARTIAL KNEE TIBIAL TRAY UNICONDYLAR METAL BACKED SIZE 3 RM/LL
Type: IMPLANTABLE DEVICE | Site: KNEE | Status: FUNCTIONAL
Brand: SIGMA

## 2025-02-19 DEVICE — SIGMA HIGH PERFORMANCE PARTIAL KNEE FEMORAL UNICONDYLAR CEMENTED SIZE 3 RM/LL
Type: IMPLANTABLE DEVICE | Site: KNEE | Status: FUNCTIONAL
Brand: SIGMA

## 2025-02-19 DEVICE — KNEE K1 TOT HEMI STD CEM IMPL CAPPED SYNTHES: Type: IMPLANTABLE DEVICE | Site: KNEE | Status: FUNCTIONAL

## 2025-02-19 DEVICE — SIGMA HIGH PERFORMANCE PARTIAL KNEE TIBIAL INSERT FIXED BEARING UNICONDYLAR SIZE 3 RM/LL 8MM
Type: IMPLANTABLE DEVICE | Site: KNEE | Status: FUNCTIONAL
Brand: SIGMA

## 2025-02-19 RX ORDER — 0.9 % SODIUM CHLORIDE 0.9 %
500 INTRAVENOUS SOLUTION INTRAVENOUS PRN
Status: DISCONTINUED | OUTPATIENT
Start: 2025-02-19 | End: 2025-02-20 | Stop reason: HOSPADM

## 2025-02-19 RX ORDER — LIDOCAINE HYDROCHLORIDE 10 MG/ML
INJECTION, SOLUTION INFILTRATION; PERINEURAL PRN
Status: DISCONTINUED | OUTPATIENT
Start: 2025-02-19 | End: 2025-02-19 | Stop reason: HOSPADM

## 2025-02-19 RX ORDER — OXYCODONE HYDROCHLORIDE 5 MG/1
5 TABLET ORAL EVERY 4 HOURS PRN
Status: DISCONTINUED | OUTPATIENT
Start: 2025-02-19 | End: 2025-02-20

## 2025-02-19 RX ORDER — ONDANSETRON 2 MG/ML
INJECTION INTRAMUSCULAR; INTRAVENOUS
Status: COMPLETED
Start: 2025-02-19 | End: 2025-02-19

## 2025-02-19 RX ORDER — ASPIRIN 81 MG/1
81 TABLET ORAL 2 TIMES DAILY
Status: DISCONTINUED | OUTPATIENT
Start: 2025-02-19 | End: 2025-02-20 | Stop reason: HOSPADM

## 2025-02-19 RX ORDER — SENNA AND DOCUSATE SODIUM 50; 8.6 MG/1; MG/1
1 TABLET, FILM COATED ORAL 2 TIMES DAILY
Status: DISCONTINUED | OUTPATIENT
Start: 2025-02-19 | End: 2025-02-20 | Stop reason: HOSPADM

## 2025-02-19 RX ORDER — AMLODIPINE BESYLATE 5 MG/1
2.5 TABLET ORAL NIGHTLY
Status: DISCONTINUED | OUTPATIENT
Start: 2025-02-19 | End: 2025-02-20 | Stop reason: HOSPADM

## 2025-02-19 RX ORDER — MIDAZOLAM HYDROCHLORIDE 2 MG/2ML
2 INJECTION, SOLUTION INTRAMUSCULAR; INTRAVENOUS PRN
Status: DISCONTINUED | OUTPATIENT
Start: 2025-02-19 | End: 2025-02-19 | Stop reason: HOSPADM

## 2025-02-19 RX ORDER — NALOXONE HYDROCHLORIDE 0.4 MG/ML
INJECTION, SOLUTION INTRAMUSCULAR; INTRAVENOUS; SUBCUTANEOUS PRN
Status: DISCONTINUED | OUTPATIENT
Start: 2025-02-19 | End: 2025-02-19 | Stop reason: HOSPADM

## 2025-02-19 RX ORDER — SODIUM CHLORIDE 0.9 % (FLUSH) 0.9 %
5-40 SYRINGE (ML) INJECTION EVERY 12 HOURS SCHEDULED
Status: DISCONTINUED | OUTPATIENT
Start: 2025-02-19 | End: 2025-02-19 | Stop reason: HOSPADM

## 2025-02-19 RX ORDER — SODIUM CHLORIDE 0.9 % (FLUSH) 0.9 %
5-40 SYRINGE (ML) INJECTION PRN
Status: DISCONTINUED | OUTPATIENT
Start: 2025-02-19 | End: 2025-02-20 | Stop reason: HOSPADM

## 2025-02-19 RX ORDER — ONDANSETRON 2 MG/ML
4 INJECTION INTRAMUSCULAR; INTRAVENOUS
Status: DISCONTINUED | OUTPATIENT
Start: 2025-02-19 | End: 2025-02-19 | Stop reason: HOSPADM

## 2025-02-19 RX ORDER — ACETAMINOPHEN 500 MG
1000 TABLET ORAL ONCE
Status: DISCONTINUED | OUTPATIENT
Start: 2025-02-19 | End: 2025-02-19 | Stop reason: SDUPTHER

## 2025-02-19 RX ORDER — AMLODIPINE BESYLATE 5 MG/1
5 TABLET ORAL DAILY
Status: DISCONTINUED | OUTPATIENT
Start: 2025-02-20 | End: 2025-02-20 | Stop reason: HOSPADM

## 2025-02-19 RX ORDER — TRAMADOL HYDROCHLORIDE 50 MG/1
50 TABLET ORAL EVERY 6 HOURS PRN
Status: DISCONTINUED | OUTPATIENT
Start: 2025-02-19 | End: 2025-02-20 | Stop reason: HOSPADM

## 2025-02-19 RX ORDER — LIDOCAINE HYDROCHLORIDE 20 MG/ML
INJECTION, SOLUTION EPIDURAL; INFILTRATION; INTRACAUDAL; PERINEURAL
Status: DISCONTINUED | OUTPATIENT
Start: 2025-02-19 | End: 2025-02-19 | Stop reason: SDUPTHER

## 2025-02-19 RX ORDER — HYDROMORPHONE HYDROCHLORIDE 1 MG/ML
0.5 INJECTION, SOLUTION INTRAMUSCULAR; INTRAVENOUS; SUBCUTANEOUS EVERY 5 MIN PRN
Status: COMPLETED | OUTPATIENT
Start: 2025-02-19 | End: 2025-02-19

## 2025-02-19 RX ORDER — ACETAMINOPHEN 325 MG/1
650 TABLET ORAL EVERY 6 HOURS
Status: DISCONTINUED | OUTPATIENT
Start: 2025-02-19 | End: 2025-02-20 | Stop reason: HOSPADM

## 2025-02-19 RX ORDER — PROCHLORPERAZINE EDISYLATE 5 MG/ML
5 INJECTION INTRAMUSCULAR; INTRAVENOUS
Status: DISCONTINUED | OUTPATIENT
Start: 2025-02-19 | End: 2025-02-19 | Stop reason: HOSPADM

## 2025-02-19 RX ORDER — AMLODIPINE BESYLATE 5 MG/1
5 TABLET ORAL DAILY
Status: DISCONTINUED | OUTPATIENT
Start: 2025-02-19 | End: 2025-02-19

## 2025-02-19 RX ORDER — LIDOCAINE HYDROCHLORIDE 10 MG/ML
1 INJECTION, SOLUTION EPIDURAL; INFILTRATION; INTRACAUDAL; PERINEURAL
Status: DISCONTINUED | OUTPATIENT
Start: 2025-02-19 | End: 2025-02-19 | Stop reason: HOSPADM

## 2025-02-19 RX ORDER — HYDROXYZINE HYDROCHLORIDE 10 MG/1
10 TABLET, FILM COATED ORAL EVERY 8 HOURS PRN
Status: DISCONTINUED | OUTPATIENT
Start: 2025-02-19 | End: 2025-02-20 | Stop reason: HOSPADM

## 2025-02-19 RX ORDER — ONDANSETRON 2 MG/ML
INJECTION INTRAMUSCULAR; INTRAVENOUS
Status: DISCONTINUED | OUTPATIENT
Start: 2025-02-19 | End: 2025-02-19 | Stop reason: SDUPTHER

## 2025-02-19 RX ORDER — SODIUM CHLORIDE 9 MG/ML
INJECTION, SOLUTION INTRAVENOUS CONTINUOUS
Status: DISCONTINUED | OUTPATIENT
Start: 2025-02-19 | End: 2025-02-20 | Stop reason: HOSPADM

## 2025-02-19 RX ORDER — POLYETHYLENE GLYCOL 3350 17 G/17G
17 POWDER, FOR SOLUTION ORAL DAILY
Status: DISCONTINUED | OUTPATIENT
Start: 2025-02-19 | End: 2025-02-20 | Stop reason: HOSPADM

## 2025-02-19 RX ORDER — PHENYLEPHRINE HCL IN 0.9% NACL 0.4MG/10ML
SYRINGE (ML) INTRAVENOUS
Status: DISCONTINUED | OUTPATIENT
Start: 2025-02-19 | End: 2025-02-19 | Stop reason: SDUPTHER

## 2025-02-19 RX ORDER — SODIUM CHLORIDE 9 MG/ML
INJECTION, SOLUTION INTRAVENOUS PRN
Status: DISCONTINUED | OUTPATIENT
Start: 2025-02-19 | End: 2025-02-19 | Stop reason: HOSPADM

## 2025-02-19 RX ORDER — FAMOTIDINE 20 MG/1
20 TABLET, FILM COATED ORAL 2 TIMES DAILY
Status: DISCONTINUED | OUTPATIENT
Start: 2025-02-19 | End: 2025-02-20 | Stop reason: HOSPADM

## 2025-02-19 RX ORDER — OXYCODONE HYDROCHLORIDE 5 MG/1
5 TABLET ORAL
Status: DISCONTINUED | OUTPATIENT
Start: 2025-02-19 | End: 2025-02-19 | Stop reason: HOSPADM

## 2025-02-19 RX ORDER — ROCURONIUM BROMIDE 10 MG/ML
INJECTION, SOLUTION INTRAVENOUS
Status: DISCONTINUED | OUTPATIENT
Start: 2025-02-19 | End: 2025-02-19 | Stop reason: SDUPTHER

## 2025-02-19 RX ORDER — SODIUM CHLORIDE 0.9 % (FLUSH) 0.9 %
5-40 SYRINGE (ML) INJECTION EVERY 12 HOURS SCHEDULED
Status: DISCONTINUED | OUTPATIENT
Start: 2025-02-19 | End: 2025-02-20 | Stop reason: HOSPADM

## 2025-02-19 RX ORDER — KETOROLAC TROMETHAMINE 30 MG/ML
15 INJECTION, SOLUTION INTRAMUSCULAR; INTRAVENOUS EVERY 6 HOURS
Status: DISCONTINUED | OUTPATIENT
Start: 2025-02-19 | End: 2025-02-20 | Stop reason: HOSPADM

## 2025-02-19 RX ORDER — FENTANYL CITRATE 50 UG/ML
100 INJECTION, SOLUTION INTRAMUSCULAR; INTRAVENOUS
Status: DISCONTINUED | OUTPATIENT
Start: 2025-02-19 | End: 2025-02-19 | Stop reason: HOSPADM

## 2025-02-19 RX ORDER — PROPOFOL 10 MG/ML
INJECTION, EMULSION INTRAVENOUS
Status: DISCONTINUED | OUTPATIENT
Start: 2025-02-19 | End: 2025-02-19 | Stop reason: SDUPTHER

## 2025-02-19 RX ORDER — DEXAMETHASONE SODIUM PHOSPHATE 10 MG/ML
8 INJECTION, SOLUTION INTRAMUSCULAR; INTRAVENOUS ONCE
Status: DISCONTINUED | OUTPATIENT
Start: 2025-02-19 | End: 2025-02-19 | Stop reason: HOSPADM

## 2025-02-19 RX ORDER — FENTANYL CITRATE 50 UG/ML
25 INJECTION, SOLUTION INTRAMUSCULAR; INTRAVENOUS EVERY 5 MIN PRN
Status: DISCONTINUED | OUTPATIENT
Start: 2025-02-19 | End: 2025-02-19 | Stop reason: HOSPADM

## 2025-02-19 RX ORDER — CELECOXIB 100 MG/1
100 CAPSULE ORAL ONCE
Status: COMPLETED | OUTPATIENT
Start: 2025-02-19 | End: 2025-02-19

## 2025-02-19 RX ORDER — SODIUM CHLORIDE 9 MG/ML
INJECTION, SOLUTION INTRAVENOUS PRN
Status: DISCONTINUED | OUTPATIENT
Start: 2025-02-19 | End: 2025-02-20 | Stop reason: HOSPADM

## 2025-02-19 RX ORDER — THYROID 60 MG/1
90 TABLET ORAL DAILY
Status: DISCONTINUED | OUTPATIENT
Start: 2025-02-20 | End: 2025-02-20 | Stop reason: HOSPADM

## 2025-02-19 RX ORDER — ACETAMINOPHEN 500 MG
1000 TABLET ORAL ONCE
Status: COMPLETED | OUTPATIENT
Start: 2025-02-19 | End: 2025-02-19

## 2025-02-19 RX ORDER — OXYCODONE HYDROCHLORIDE 5 MG/1
5 TABLET ORAL EVERY 4 HOURS PRN
Status: DISCONTINUED | OUTPATIENT
Start: 2025-02-19 | End: 2025-02-19

## 2025-02-19 RX ORDER — PROPOFOL 10 MG/ML
INJECTION, EMULSION INTRAVENOUS
Status: COMPLETED
Start: 2025-02-19 | End: 2025-02-19

## 2025-02-19 RX ORDER — ONDANSETRON 4 MG/1
4 TABLET, ORALLY DISINTEGRATING ORAL EVERY 8 HOURS PRN
Status: DISCONTINUED | OUTPATIENT
Start: 2025-02-19 | End: 2025-02-20 | Stop reason: HOSPADM

## 2025-02-19 RX ORDER — TRANEXAMIC ACID 100 MG/ML
INJECTION, SOLUTION INTRAVENOUS
Status: DISCONTINUED | OUTPATIENT
Start: 2025-02-19 | End: 2025-02-19 | Stop reason: SDUPTHER

## 2025-02-19 RX ORDER — ATORVASTATIN CALCIUM 10 MG/1
10 TABLET, FILM COATED ORAL EVERY EVENING
Status: DISCONTINUED | OUTPATIENT
Start: 2025-02-19 | End: 2025-02-20 | Stop reason: HOSPADM

## 2025-02-19 RX ORDER — SODIUM CHLORIDE, SODIUM LACTATE, POTASSIUM CHLORIDE, CALCIUM CHLORIDE 600; 310; 30; 20 MG/100ML; MG/100ML; MG/100ML; MG/100ML
INJECTION, SOLUTION INTRAVENOUS CONTINUOUS
Status: DISCONTINUED | OUTPATIENT
Start: 2025-02-19 | End: 2025-02-19 | Stop reason: HOSPADM

## 2025-02-19 RX ORDER — HYDRALAZINE HYDROCHLORIDE 20 MG/ML
10 INJECTION INTRAMUSCULAR; INTRAVENOUS
Status: DISCONTINUED | OUTPATIENT
Start: 2025-02-19 | End: 2025-02-19 | Stop reason: HOSPADM

## 2025-02-19 RX ORDER — BISACODYL 10 MG
10 SUPPOSITORY, RECTAL RECTAL DAILY PRN
Status: DISCONTINUED | OUTPATIENT
Start: 2025-02-19 | End: 2025-02-20 | Stop reason: HOSPADM

## 2025-02-19 RX ORDER — SODIUM CHLORIDE 0.9 % (FLUSH) 0.9 %
5-40 SYRINGE (ML) INJECTION PRN
Status: DISCONTINUED | OUTPATIENT
Start: 2025-02-19 | End: 2025-02-19 | Stop reason: HOSPADM

## 2025-02-19 RX ORDER — NALOXONE HYDROCHLORIDE 0.4 MG/ML
0.4 INJECTION, SOLUTION INTRAMUSCULAR; INTRAVENOUS; SUBCUTANEOUS PRN
Status: DISCONTINUED | OUTPATIENT
Start: 2025-02-19 | End: 2025-02-20 | Stop reason: HOSPADM

## 2025-02-19 RX ORDER — ONDANSETRON 2 MG/ML
4 INJECTION INTRAMUSCULAR; INTRAVENOUS EVERY 6 HOURS PRN
Status: DISCONTINUED | OUTPATIENT
Start: 2025-02-19 | End: 2025-02-20 | Stop reason: HOSPADM

## 2025-02-19 RX ORDER — PREGABALIN 50 MG/1
50 CAPSULE ORAL 3 TIMES DAILY
Status: DISCONTINUED | OUTPATIENT
Start: 2025-02-19 | End: 2025-02-20 | Stop reason: HOSPADM

## 2025-02-19 RX ORDER — FENTANYL CITRATE 50 UG/ML
INJECTION, SOLUTION INTRAMUSCULAR; INTRAVENOUS
Status: DISCONTINUED | OUTPATIENT
Start: 2025-02-19 | End: 2025-02-19 | Stop reason: SDUPTHER

## 2025-02-19 RX ADMIN — PHENYLEPHRINE HYDROCHLORIDE 50 MCG/MIN: 10 INJECTION INTRAVENOUS at 11:57

## 2025-02-19 RX ADMIN — HYDROMORPHONE HYDROCHLORIDE 0.5 MG: 1 INJECTION, SOLUTION INTRAMUSCULAR; INTRAVENOUS; SUBCUTANEOUS at 13:55

## 2025-02-19 RX ADMIN — SUGAMMADEX 100 MG: 100 INJECTION, SOLUTION INTRAVENOUS at 12:56

## 2025-02-19 RX ADMIN — Medication 120 MCG: at 12:48

## 2025-02-19 RX ADMIN — ONDANSETRON 4 MG: 2 INJECTION INTRAMUSCULAR; INTRAVENOUS at 16:04

## 2025-02-19 RX ADMIN — FENTANYL CITRATE 25 MCG: 50 INJECTION INTRAMUSCULAR; INTRAVENOUS at 14:30

## 2025-02-19 RX ADMIN — ONDANSETRON 4 MG: 2 INJECTION INTRAMUSCULAR; INTRAVENOUS at 22:15

## 2025-02-19 RX ADMIN — FENTANYL CITRATE 50 MCG: 50 INJECTION INTRAMUSCULAR; INTRAVENOUS at 12:26

## 2025-02-19 RX ADMIN — WATER 2000 MG: 1 INJECTION INTRAMUSCULAR; INTRAVENOUS; SUBCUTANEOUS at 12:01

## 2025-02-19 RX ADMIN — HYDROMORPHONE HYDROCHLORIDE 0.5 MG: 1 INJECTION, SOLUTION INTRAMUSCULAR; INTRAVENOUS; SUBCUTANEOUS at 14:15

## 2025-02-19 RX ADMIN — FAMOTIDINE 20 MG: 20 TABLET, FILM COATED ORAL at 21:24

## 2025-02-19 RX ADMIN — Medication 20 MG: at 11:54

## 2025-02-19 RX ADMIN — ACETAMINOPHEN 1000 MG: 500 TABLET ORAL at 11:17

## 2025-02-19 RX ADMIN — FENTANYL CITRATE 25 MCG: 50 INJECTION INTRAMUSCULAR; INTRAVENOUS at 13:20

## 2025-02-19 RX ADMIN — SODIUM CHLORIDE: 0.9 INJECTION, SOLUTION INTRAVENOUS at 13:49

## 2025-02-19 RX ADMIN — TRANEXAMIC ACID 1000 MG: 100 INJECTION INTRAVENOUS at 12:01

## 2025-02-19 RX ADMIN — PROPOFOL 100 MCG/KG/MIN: 10 INJECTION, EMULSION INTRAVENOUS at 11:36

## 2025-02-19 RX ADMIN — CELECOXIB 100 MG: 100 CAPSULE ORAL at 11:17

## 2025-02-19 RX ADMIN — SODIUM CHLORIDE: 0.9 INJECTION, SOLUTION INTRAVENOUS at 16:59

## 2025-02-19 RX ADMIN — SODIUM CHLORIDE, POTASSIUM CHLORIDE, SODIUM LACTATE AND CALCIUM CHLORIDE: 600; 310; 30; 20 INJECTION, SOLUTION INTRAVENOUS at 11:17

## 2025-02-19 RX ADMIN — PREGABALIN 50 MG: 50 CAPSULE ORAL at 21:24

## 2025-02-19 RX ADMIN — ATORVASTATIN CALCIUM 10 MG: 10 TABLET, FILM COATED ORAL at 19:34

## 2025-02-19 RX ADMIN — FENTANYL CITRATE 25 MCG: 50 INJECTION INTRAMUSCULAR; INTRAVENOUS at 13:26

## 2025-02-19 RX ADMIN — TRAMADOL HYDROCHLORIDE 50 MG: 50 TABLET ORAL at 21:23

## 2025-02-19 RX ADMIN — FENTANYL CITRATE 25 MCG: 50 INJECTION INTRAMUSCULAR; INTRAVENOUS at 14:22

## 2025-02-19 RX ADMIN — LIDOCAINE HYDROCHLORIDE 60 MG: 20 INJECTION, SOLUTION EPIDURAL; INFILTRATION; INTRACAUDAL; PERINEURAL at 11:36

## 2025-02-19 RX ADMIN — PROPOFOL 100 MG: 10 INJECTION, EMULSION INTRAVENOUS at 11:56

## 2025-02-19 RX ADMIN — ROCURONIUM BROMIDE 30 MG: 10 INJECTION, SOLUTION INTRAVENOUS at 11:56

## 2025-02-19 RX ADMIN — WATER 2000 MG: 1 INJECTION INTRAMUSCULAR; INTRAVENOUS; SUBCUTANEOUS at 21:24

## 2025-02-19 RX ADMIN — ASPIRIN 81 MG: 81 TABLET, COATED ORAL at 21:23

## 2025-02-19 RX ADMIN — ONDANSETRON 4 MG: 2 INJECTION INTRAMUSCULAR; INTRAVENOUS at 11:36

## 2025-02-19 RX ADMIN — AMLODIPINE BESYLATE 2.5 MG: 5 TABLET ORAL at 21:24

## 2025-02-19 RX ADMIN — ACETAMINOPHEN 650 MG: 325 TABLET ORAL at 19:34

## 2025-02-19 RX ADMIN — KETOROLAC TROMETHAMINE 15 MG: 30 INJECTION, SOLUTION INTRAMUSCULAR; INTRAVENOUS at 21:23

## 2025-02-19 ASSESSMENT — PAIN DESCRIPTION - LOCATION
LOCATION: KNEE

## 2025-02-19 ASSESSMENT — PAIN SCALES - GENERAL
PAINLEVEL_OUTOF10: 7
PAINLEVEL_OUTOF10: 8
PAINLEVEL_OUTOF10: 7
PAINLEVEL_OUTOF10: 3
PAINLEVEL_OUTOF10: 6
PAINLEVEL_OUTOF10: 7
PAINLEVEL_OUTOF10: 6

## 2025-02-19 ASSESSMENT — PAIN DESCRIPTION - DESCRIPTORS
DESCRIPTORS: SHARP
DESCRIPTORS: ACHING
DESCRIPTORS: SHARP

## 2025-02-19 ASSESSMENT — PAIN DESCRIPTION - ORIENTATION
ORIENTATION: RIGHT

## 2025-02-19 ASSESSMENT — PAIN - FUNCTIONAL ASSESSMENT: PAIN_FUNCTIONAL_ASSESSMENT: 0-10

## 2025-02-19 NOTE — PROGRESS NOTES
1430: family updated via volunteer desk.    1512:Verbal sign out Dr. Barnes.    1542:TRANSFER - OUT REPORT:    Verbal report given to Nikole RN(name) on Cinthia Cadena  being transferred to 576(unit) for routine post-op       Report consisted of patient’s Situation, Background, Assessment and   Recommendations(SBAR).     Time Pre op antibiotic given:1201  Anesthesia Stop time: 1325    Information from the following report(s) SBAR, Kardex, OR Summary, Intake/Output, and MAR was reviewed with the receiving nurse.    Opportunity for questions and clarification was provided.     Is the patient on 02? Yes       L/Min 2           Is the patient on a monitor? No    Is the nurse transporting with the patient? No    At transfer, are there Patient Belongings with the patient?  If Yes, please note/list:    Surgical Waiting Area notified of patient's transfer from PACU? Yes

## 2025-02-19 NOTE — DISCHARGE INSTRUCTIONS
Discharge Instructions Knee Replacement  Dr. Hogan  Patient Name:  Cinthia Cadena    Follow up care  Follow up visit with Dr. Hogan in 4 weeks.  Call [phone] extension [phone] (Jayro) to make an appointment.  If Home Health has been arranged for you, they will call you to arrange dates/times for visits. Call them if you do not hear from them within 24 hours after you go home.    Activity at home  Take a short walk every hour; except at night when sleeping.  Do your Home Exercise Program 3 times every day.   After exercising lie down and elevate your leg on pillows for 15-30 minutes to decrease swelling.  Refer to your patient notebook for more information.  Bathing and caring for your incision  You have a waterproof surgical bandage (called bambi). This bandage is to remain in place until your follow up appointment.   You may shower the day after surgery.      Preventing blood clots  Take Aspirin 81mg twice each day for one month (30 days) following surgery.   Call Dr. Hogan for signs of a blood clot in your leg: calf pain, tenderness, redness, swelling of lower leg   Preventing lung congestion  Use your incentive spirometer 4 times a day; do 10 repetitions each time  Remember to keep the small blue ball between the two arrows when taking a slow, deep breath   Pain Management  Get up and walk a short distance to relieve pain and stiffness.  Place ice wrap on your knee except when you are walking. The gel ice packs should be changed about every 4 hours.  Elevate your leg on pillows for 15-30 minutes.   Pain Medications  Take Tylenol 650mg (take two 325mg tablets) every 6 hours for the next 4 weeks.  Take Meloxicam (Mobic) every day as prescribed to decrease swelling and inflammation. Do not take Meloxicam if you have had stomach ulcers.  Take Famotidine (Pepcid) 20mg twice a day to prevent an upset stomach (if taking Aspirin and/or Meloxicam).  If needed, take Tramadol (narcotic pain pill) every 6 hours as

## 2025-02-19 NOTE — FLOWSHEET NOTE
02/19/25 1219   Family Communication    Relationship to Patient Daughter    Phone Number Bre Irizarry [phone]   Family/Significant Other Update Called   Delivery Origin Nurse   Family Communication   Family Update Message Procedure started

## 2025-02-19 NOTE — PLAN OF CARE
Problem: Physical Therapy - Adult  Goal: By Discharge: Performs mobility at highest level of function for planned discharge setting.  See evaluation for individualized goals.  Description: FUNCTIONAL STATUS PRIOR TO ADMISSION: Patient was independent without use of DME.    HOME SUPPORT PRIOR TO ADMISSION: The patient lived with  and did not require assistance.    Physical Therapy Goals  Initiated 2/19/2025  1.  Patient will move from supine to sit and sit to supine and scoot up and down in bed with modified independence within 4 day(s).    2.  Patient will perform sit to stand with modified independence within 4 day(s).  3.  Patient will transfer from bed to chair and chair to bed with modified independence using the least restrictive device within 4 day(s).  4.  Patient will ambulate with modified independence for > 150 feet with the least restrictive device within 4 day(s).   5.  Patient will ascend/descend 4 stairs with one handrail(s) with supervision within 4 day(s).  6. Patient will perform TKR home exercise program per protocol with supervision/set-up within 4 days.  7. Patient will demonstrate AROM 0-90 degrees in operative joint within 4 days.     PHYSICAL THERAPY EVALUATION    Patient: Cinthia Cadena (80 y.o. female)  Date: 2/19/2025  Primary Diagnosis: Arthritis of right knee [M17.11]  Localized osteoarthritis of right knee [M17.11]  Procedure(s) (LRB):  RIGHT UNICONDYLAR MEDIAL KNEE ARTHROPLASTY (Right) Day of Surgery   Precautions: Restrictions/Precautions: Weight Bearing, Fall Risk  Activity Level: Up with Assist   Lower Extremity Weight Bearing Restrictions  Right Lower Extremity Weight Bearing: Weight Bearing As Tolerated                  ASSESSMENT :   DEFICITS/IMPAIRMENTS:   The patient presents POD # 0 right unicondylar medial knee arthroplasty with pain right knee, decreased AROM/strength and function right leg, decreased activity tolerance, decline in functional mobility and impaired

## 2025-02-19 NOTE — H&P
Update History & Physical    The patient's History and Physical  was reviewed with the patient and I examined the patient. There was no change. The surgical site was confirmed by the patient and me.       Plan: The risks, benefits, expected outcome, and alternative to the recommended procedure have been discussed with the patient. Patient understands and wants to proceed with the procedure.     Electronically signed by Gary Hogan MD on 2/19/2025 at 10:44 AM

## 2025-02-19 NOTE — OP NOTE
Name: Cinthia Cadena  MRN:  [id number]  : 1945  Age:  80 y.o.  Surgery Date: 2025      OPERATIVE REPORT   RIGHT UNCOMPARTMENTAL KNEE REPLACEMENT    PREOPERATIVE DIAGNOSIS: Osteoarthritis medial compartment, right knee.    POSTOPERATIVE DIAGNOSIS: Osteoarthritis medial compartment, right knee.    PROCEDURE PERFORMED: Computer navigated Right unicompartmental knee arthroplasty.    SURGEON: Gary Hogan MD    FIRST ASSISTANT:  Fiona Vaughan    ANESTHESIA: General    PRE-OP ANTIBIOTIC: Ancef 2g    COMPLICATIONS: None.    ESTIMATED BLOOD LOSS: 50 mL.    SPECIMENS REMOVED: None.    COMPONENTS IMPLANTED:   Implant Name Type Inv. Item Serial No.  Lot No. LRB No. Used Action   CEMENT BNE 40GM FULL DOSE PMMA W/ GENT HI VISC RADPQ LNG - SN/A  CEMENT BNE 40GM FULL DOSE PMMA W/ GENT HI VISC RADPQ LNG N/A [facility] [id number] Right 1 Implanted   COMPONENT FEM SZ 3 L LAT R MED UNI KNEE POLYETH PRASHANT HI PERF - SN/A  COMPONENT FEM SZ 3 L LAT R MED UNI KNEE POLYETH PRASHANT HI PERF N/A [facility] [facility] [id number] Right 1 Implanted   TRAY TIB SZ 3 L LAT R MED UNI KNEE POLYETH FIX BEAR HI PERF - SN/A  TRAY TIB SZ 3 L LAT R MED UNI KNEE POLYETH FIX BEAR HI PERF N/A [facility] [id number] Right 1 Implanted   INSERT TIB FIX BEAR 3 8 MM UNI CNDYL RT MEDL LT LAT AOX SIG - SN/A  INSERT TIB FIX BEAR 3 8 MM UNI CNDYL RT MEDL LT LAT AOX SIG N/A [facility] [id number] Right 1 Implanted       INDICATIONS: The patient is an 80 yrs female with progressive debilitating right knee pain due to osteoarthritis. Symptoms have progressed despite comprehensive conservative treatment and he presents for right unicompartmental knee replacement. Risks, benefits, alternatives of the procedure were reviewed  in detail and the patient desired to proceed. The patient understands the increased risk for perioperative medical complications due to their medical comorbidities.  A

## 2025-02-19 NOTE — ANESTHESIA PRE PROCEDURE
Department of Anesthesiology  Preprocedure Note       Name:  Cinthia Cadena   Age:  80 y.o.  :  1945                                          MRN:  [id number]         Date:  2025      Surgeon: Surgeon(s):  Gary Hogan MD    Procedure: Procedure(s):  RIGHT UNICONDYLAR MEDIAL KNEE ARTHROPLASTY    Medications prior to admission:   Prior to Admission medications    Medication Sig Start Date End Date Taking? Authorizing Provider   amLODIPine (NORVASC) 5 MG tablet Take 1 tablet by mouth daily 5 MG IN THE AM AND 2.5 MG AT BEDTIME   Yes Maeve Evangelista MD   Multiple Vitamins-Minerals (THERAPEUTIC MULTIVITAMIN-MINERALS) tablet Take 1 tablet by mouth daily   Yes Maeve Evangelista MD   vitamin E 180 MG (400 UNIT) CAPS capsule Take 1 capsule by mouth daily   Yes Maeve Evangelista MD   Potassium Gluconate 550 MG TABS Take by mouth daily   Yes Maeve Evangelista MD   VITAMIN A PO Take 2,400 mcg by mouth daily   Yes Maeve Evangelista MD   butalbital-acetaminophen-caffeine (FIORICET, ESGIC) -40 MG per tablet Take 2 tablets by mouth every 8-12 hours as needed for Headaches  Patient taking differently: Take 2 tablets by mouth as needed for Headaches 24  Yes Rosita Vivar ANP   pregabalin (LYRICA) 25 MG capsule Take 1 capsule by mouth 3 times daily for 180 days. Max Daily Amount: 75 mg  Patient taking differently: Take 2 capsules by mouth 3 times daily. 50 MG IN AM, 25 MG AFTERNOON AND BEDTIME 23 Yes Micheline Acosta, APRN - NP   Multiple Vitamins-Minerals (PRESERVISION AREDS 2+MULTI VIT PO) daily   Yes Maeve Evangelista MD   estrogens, conjugated, (PREMARIN) 0.625 MG tablet 1 tablet Every Monday, Wednesday, and 18  Yes Maeve Evangelista MD   atorvastatin (LIPITOR) 10 MG tablet Take 2 tablets by mouth every evening 20  Yes Automatic Reconciliation, Ar   Cholecalciferol 50 MCG ( UT) TABS Take 1 tablet by mouth daily   Yes Automatic

## 2025-02-19 NOTE — ANESTHESIA POSTPROCEDURE EVALUATION
Department of Anesthesiology  Postprocedure Note    Patient: Cinthia Cadena  MRN: [id number]  YOB: 1945  Date of evaluation: 2/19/2025    Procedure Summary       Date: 02/19/25 Room / Location: [facility] MAIN OR [address] [place] [address] MAIN OR    Anesthesia Start: 1132 Anesthesia Stop: 1327    Procedure: RIGHT UNICONDYLAR MEDIAL KNEE ARTHROPLASTY (Right: Knee) Diagnosis:       Arthritis of right knee      (Arthritis of right knee [M17.11])    Providers: Gary Hogan MD Responsible Provider: Ruslan Hagan Jr., MD    Anesthesia Type: General ASA Status: 2            Anesthesia Type: General    Danilo Phase I: Danilo Score: 9    Danilo Phase II:      Anesthesia Post Evaluation    Patient location during evaluation: PACU  Patient participation: complete - patient participated  Level of consciousness: awake  Airway patency: patent  Nausea & Vomiting: no nausea  Cardiovascular status: blood pressure returned to baseline and hemodynamically stable  Respiratory status: acceptable  Hydration status: stable  Multimodal analgesia pain management approach    No notable events documented.

## 2025-02-19 NOTE — PROGRESS NOTES
Ortho NP Note    POD# 0  s/p RIGHT UNICONDYLAR MEDIAL KNEE ARTHROPLASTY   Pt seen with family at bedside.     Pt resting in bed comfortably.   Recently mobilized with PT, able to walk to door and back to bed   Reports postop knee feels \"really tired\". Discussed multimodal pain regimen, unable to take oxycodone but has tolerated norco and tramadol in past   Nausea improving with recent zofran. Tolerating ginger ale.   Postop plan reviewed - No complaints/concerns.    VSS Afebrile.    Visit Vitals  BP (!) 149/76   Pulse 67   Temp 97.4 °F (36.3 °C) (Oral)   Resp 18   SpO2 97%       Voiding status: voiding          Labs    Lab Results   Component Value Date/Time    HGB 12.8 02/03/2025 01:43 PM      Lab Results   Component Value Date/Time    INR 1.0 02/03/2025 01:43 PM      Lab Results   Component Value Date/Time     02/03/2025 01:43 PM    K 3.7 02/03/2025 01:43 PM    CL 98 02/03/2025 01:43 PM    CO2 29 02/03/2025 01:43 PM    BUN 10 02/03/2025 01:43 PM     Recent Glucose Results:   Glucose   Date Value Ref Range Status   02/03/2025 97 65 - 100 mg/dL Final   05/02/2023 113 (H) 65 - 100 mg/dL Final   04/21/2023 101 (H) 65 - 100 mg/dL Final           There is no height or weight on file to calculate BMI. : A BMI > 30 is classified as obesity and > 40 is classified as morbid obesity.       Ace wrap dressing c.d.i  Cryotherapy in place over incision  Calves soft and supple; No pain with passive stretch  Sensation and motor intact. +PF/DF/EHL intact   SCDs for mechanical DVT proph while in bed     PLAN:  1) PT BID, OT - WBAT  2) Aspirin 81 mg PO BID for DVT Prophylaxis. Encouraged early mobilization, bed exercises, and SCD use.  3) GI Prophylaxis - Pepcid    4) Pain control - scheduled tylenol  and toradol, and prn  Norco and tramadol .  5) Post op care - Continue bowel regimen, encouraged IS. Straight cath per protocol. Prineo to remain in place until follow up unless integrity is lost.    6) Readniess for discharge:

## 2025-02-20 VITALS
BODY MASS INDEX: 18.3 KG/M2 | TEMPERATURE: 97.7 F | HEART RATE: 58 BPM | SYSTOLIC BLOOD PRESSURE: 125 MMHG | RESPIRATION RATE: 16 BRPM | OXYGEN SATURATION: 96 % | WEIGHT: 110 LBS | DIASTOLIC BLOOD PRESSURE: 67 MMHG

## 2025-02-20 PROBLEM — Z96.651 S/P TKR (TOTAL KNEE REPLACEMENT), RIGHT: Status: ACTIVE | Noted: 2025-02-20

## 2025-02-20 LAB
ANION GAP SERPL CALC-SCNC: 6 MMOL/L (ref 2–12)
BUN SERPL-MCNC: 8 MG/DL (ref 6–20)
BUN/CREAT SERPL: 11 (ref 12–20)
CALCIUM SERPL-MCNC: 9.5 MG/DL (ref 8.5–10.1)
CHLORIDE SERPL-SCNC: 101 MMOL/L (ref 97–108)
CO2 SERPL-SCNC: 29 MMOL/L (ref 21–32)
CREAT SERPL-MCNC: 0.71 MG/DL (ref 0.55–1.02)
GLUCOSE BLD STRIP.AUTO-MCNC: 84 MG/DL (ref 65–117)
GLUCOSE SERPL-MCNC: 79 MG/DL (ref 65–100)
HCT VFR BLD AUTO: 36.3 % (ref 35–47)
HGB BLD-MCNC: 11.7 G/DL (ref 11.5–16)
POTASSIUM SERPL-SCNC: 3.8 MMOL/L (ref 3.5–5.1)
SERVICE CMNT-IMP: NORMAL
SODIUM SERPL-SCNC: 136 MMOL/L (ref 136–145)

## 2025-02-20 PROCEDURE — 97116 GAIT TRAINING THERAPY: CPT

## 2025-02-20 PROCEDURE — 96376 TX/PRO/DX INJ SAME DRUG ADON: CPT

## 2025-02-20 PROCEDURE — 85014 HEMATOCRIT: CPT

## 2025-02-20 PROCEDURE — 80048 BASIC METABOLIC PNL TOTAL CA: CPT

## 2025-02-20 PROCEDURE — 96374 THER/PROPH/DIAG INJ IV PUSH: CPT

## 2025-02-20 PROCEDURE — 2500000003 HC RX 250 WO HCPCS: Performed by: PHYSICIAN ASSISTANT

## 2025-02-20 PROCEDURE — 6370000000 HC RX 637 (ALT 250 FOR IP): Performed by: PHYSICIAN ASSISTANT

## 2025-02-20 PROCEDURE — 6360000002 HC RX W HCPCS: Performed by: PHYSICIAN ASSISTANT

## 2025-02-20 PROCEDURE — 85018 HEMOGLOBIN: CPT

## 2025-02-20 PROCEDURE — 97165 OT EVAL LOW COMPLEX 30 MIN: CPT

## 2025-02-20 PROCEDURE — 97110 THERAPEUTIC EXERCISES: CPT

## 2025-02-20 PROCEDURE — 96375 TX/PRO/DX INJ NEW DRUG ADDON: CPT

## 2025-02-20 PROCEDURE — 6370000000 HC RX 637 (ALT 250 FOR IP): Performed by: NURSE PRACTITIONER

## 2025-02-20 PROCEDURE — 97535 SELF CARE MNGMENT TRAINING: CPT

## 2025-02-20 PROCEDURE — G0378 HOSPITAL OBSERVATION PER HR: HCPCS

## 2025-02-20 PROCEDURE — 82962 GLUCOSE BLOOD TEST: CPT

## 2025-02-20 RX ORDER — HYDROCODONE BITARTRATE AND ACETAMINOPHEN 5; 325 MG/1; MG/1
1 TABLET ORAL EVERY 6 HOURS PRN
Qty: 20 TABLET | Refills: 0 | Status: SHIPPED | OUTPATIENT
Start: 2025-02-20 | End: 2025-02-25

## 2025-02-20 RX ORDER — TRAMADOL HYDROCHLORIDE 50 MG/1
50 TABLET ORAL EVERY 6 HOURS PRN
Qty: 28 TABLET | Refills: 0 | Status: SHIPPED | OUTPATIENT
Start: 2025-02-20 | End: 2025-02-27

## 2025-02-20 RX ORDER — FAMOTIDINE 20 MG/1
20 TABLET, FILM COATED ORAL 2 TIMES DAILY
Qty: 60 TABLET | Refills: 0 | Status: SHIPPED | OUTPATIENT
Start: 2025-02-20

## 2025-02-20 RX ORDER — BUTALBITAL, ACETAMINOPHEN AND CAFFEINE 50; 325; 40 MG/1; MG/1; MG/1
2 TABLET ORAL AS NEEDED
Qty: 1 TABLET | Refills: 0 | Status: SHIPPED
Start: 2025-02-20

## 2025-02-20 RX ORDER — HYDROCODONE BITARTRATE AND ACETAMINOPHEN 5; 325 MG/1; MG/1
1 TABLET ORAL EVERY 6 HOURS PRN
Status: DISCONTINUED | OUTPATIENT
Start: 2025-02-20 | End: 2025-02-20 | Stop reason: HOSPADM

## 2025-02-20 RX ORDER — ASPIRIN 81 MG/1
81 TABLET ORAL 2 TIMES DAILY
Qty: 60 TABLET | Refills: 0 | Status: SHIPPED | OUTPATIENT
Start: 2025-02-20 | End: 2025-03-22

## 2025-02-20 RX ORDER — POLYETHYLENE GLYCOL 3350 17 G/17G
17 POWDER, FOR SOLUTION ORAL DAILY
Qty: 7 EACH | Refills: 0 | Status: SHIPPED
Start: 2025-02-20 | End: 2025-03-22

## 2025-02-20 RX ORDER — MELOXICAM 7.5 MG/1
7.5 TABLET ORAL DAILY
Qty: 30 TABLET | Refills: 0 | Status: SHIPPED | OUTPATIENT
Start: 2025-02-20

## 2025-02-20 RX ORDER — GALCANEZUMAB 120 MG/ML
120 INJECTION, SOLUTION SUBCUTANEOUS
Qty: 1 ADJUSTABLE DOSE PRE-FILLED PEN SYRINGE | Refills: 0 | Status: SHIPPED
Start: 2025-02-20 | End: 2026-02-20

## 2025-02-20 RX ADMIN — ONDANSETRON 4 MG: 2 INJECTION INTRAMUSCULAR; INTRAVENOUS at 06:06

## 2025-02-20 RX ADMIN — PREGABALIN 50 MG: 50 CAPSULE ORAL at 08:26

## 2025-02-20 RX ADMIN — TRAMADOL HYDROCHLORIDE 50 MG: 50 TABLET ORAL at 11:57

## 2025-02-20 RX ADMIN — TRAMADOL HYDROCHLORIDE 50 MG: 50 TABLET ORAL at 06:06

## 2025-02-20 RX ADMIN — SENNOSIDES AND DOCUSATE SODIUM 1 TABLET: 50; 8.6 TABLET ORAL at 08:27

## 2025-02-20 RX ADMIN — ASPIRIN 81 MG: 81 TABLET, COATED ORAL at 08:26

## 2025-02-20 RX ADMIN — FAMOTIDINE 20 MG: 20 TABLET, FILM COATED ORAL at 08:27

## 2025-02-20 RX ADMIN — AMLODIPINE BESYLATE 5 MG: 5 TABLET ORAL at 08:26

## 2025-02-20 RX ADMIN — KETOROLAC TROMETHAMINE 15 MG: 30 INJECTION, SOLUTION INTRAMUSCULAR; INTRAVENOUS at 05:05

## 2025-02-20 RX ADMIN — KETOROLAC TROMETHAMINE 15 MG: 30 INJECTION, SOLUTION INTRAMUSCULAR; INTRAVENOUS at 08:27

## 2025-02-20 RX ADMIN — LEVOTHYROXINE, LIOTHYRONINE 90 MG: 38; 9 TABLET ORAL at 08:27

## 2025-02-20 RX ADMIN — WATER 2000 MG: 1 INJECTION INTRAMUSCULAR; INTRAVENOUS; SUBCUTANEOUS at 05:05

## 2025-02-20 RX ADMIN — HYDROCODONE BITARTRATE AND ACETAMINOPHEN 1 TABLET: 5; 325 TABLET ORAL at 09:58

## 2025-02-20 ASSESSMENT — PAIN SCALES - GENERAL
PAINLEVEL_OUTOF10: 7
PAINLEVEL_OUTOF10: 5

## 2025-02-20 NOTE — PLAN OF CARE
Problem: Physical Therapy - Adult  Goal: By Discharge: Performs mobility at highest level of function for planned discharge setting.  See evaluation for individualized goals.  Description: FUNCTIONAL STATUS PRIOR TO ADMISSION: Patient was independent without use of DME.    HOME SUPPORT PRIOR TO ADMISSION: The patient lived with  and did not require assistance.    Physical Therapy Goals  Initiated 2/19/2025  1.  Patient will move from supine to sit and sit to supine and scoot up and down in bed with modified independence within 4 day(s).    2.  Patient will perform sit to stand with modified independence within 4 day(s).  3.  Patient will transfer from bed to chair and chair to bed with modified independence using the least restrictive device within 4 day(s).  4.  Patient will ambulate with modified independence for > 150 feet with the least restrictive device within 4 day(s).   5.  Patient will ascend/descend 4 stairs with one handrail(s) with supervision within 4 day(s).  6. Patient will perform TKR home exercise program per protocol with supervision/set-up within 4 days.  7. Patient will demonstrate AROM 0-90 degrees in operative joint within 4 days.     Outcome: Progressing   PHYSICAL THERAPY TREATMENT    Patient: Cinthia Cadena (80 y.o. female)  Date: 2/20/2025  Diagnosis: Arthritis of right knee [M17.11]  Localized osteoarthritis of right knee [M17.11] Arthritis of right knee  Procedure(s) (LRB):  RIGHT UNICONDYLAR MEDIAL KNEE ARTHROPLASTY (Right) 1 Day Post-Op  Precautions: Weight Bearing, Fall Risk Right Lower Extremity Weight Bearing: Weight Bearing As Tolerated                    ASSESSMENT:  Patient continues to benefit from skilled PT services and is progressing towards goals. Patient received supine in bed, agreeable to therapy and motivated. Participated in supine and seated exercises, recalled all exercises without cues. Ambulated with antalgic gait to stairs and completed with CGA and cues

## 2025-02-20 NOTE — PROGRESS NOTES
Occupational Therapy  02/20/25    Orders received, chart reviewed and patient evaluated by occupational therapy. Pending progression with skilled acute occupational therapy, recommend:    No skilled occupational therapy    Pt is cleared for discharge home from an OT standpoint.    Recommend with nursing patient to complete as able in order to maintain strength, endurance and independence: OOB to chair 3x/day, ADLs with Spv and performing toileting with Spv assist. Thank you for your assistance.     Full evaluation to follow.   Leslie Mccoy, OT

## 2025-02-20 NOTE — DISCHARGE SUMMARY
Ortho Discharge Summary    Patient ID:  Cinthia Cadena  [id number]  female  80 y.o.  1945    Admit date: 2/19/2025    Discharge date: 2/20/2025    Admitting Physician: Gary Hogan MD     Consulting Physician(s):   Treatment Team:   Gary Hogan MD Mudrick, Colin A, MD Garcia, Lauren, OT  Mercy Olivas, RN  Chata Vela, PT  Dang Riojas PT    Date of Surgery:   2/19/2025     Preoperative Diagnosis:  Arthritis of right knee [M17.11]    Postoperative Diagnosis:   * No post-op diagnosis entered *    Procedure(s):   RIGHT UNICONDYLAR MEDIAL KNEE ARTHROPLASTY     Anesthesia Type:   General     Surgeon: Gary Hogan MD                            HPI:  Pt is a 80 y.o. female who has a history of Arthritis of right knee [M17.11]  with pain and limitations of activities of daily living who presents at this time for a RIGHT UNICONDYLAR MEDIAL KNEE ARTHROPLASTY following the failure of conservative management.    PMH:   Past Medical History:   Diagnosis Date    Arthritis     Cancer (HCC)     Skin    Chronic pain     LEFT KNEE AND BACK, NECK AND HEAD    Colitis     Degenerative cervical spinal stenosis     Degenerative lumbar spinal stenosis     GERD (gastroesophageal reflux disease)     Hyperlipemia     Hypertension     Migraine     Occipital neuralgia of right side     Thyroid disease     Ulceration of vulva 01/17/2008    Wound of left leg 12/13/2017       There is no height or weight on file to calculate BMI. : A BMI > 30 is classified as obesity and > 40 is classified as morbid obesity.     Medications upon admission :   Prior to Admission Medications   Prescriptions Last Dose Informant Patient Reported? Taking?   Cholecalciferol 50 MCG (2000 UT) TABS Past Week  Yes Yes   Sig: Take 1 tablet by mouth daily   Galcanezumab-gnlm (EMGALITY) 120 MG/ML SOAJ 1/23/2025  No No   Sig: Inject 120 mg into the skin every 30 days Indications: Migraine   Patient taking differently: Inject 120 mg into the skin

## 2025-02-20 NOTE — CARE COORDINATION
Care Management Initial Assessment       RUR:n/a  Readmission? No  1st IM letter given? No, pt in observation status  1st  letter given: No    Anticipate d/c home with [facility] added to AVS;    Pt owns a RW    Spouse transport    -s/p RIGHT UNICONDYLAR MEDIAL KNEE ARTHROPLASTY   -PT recs. HH    CM met with patient at bedside to introduce self and role. Pt resides with spouse in a one story home and stated she would like [facility] as she has already contacted them to make them aware of her HH need at d/c.  CM sent referral in [facility].      ADLs: independent  DME: RW, cane  PCP follow up: Dr. Brian Vides, f/u as directed on AVS  Previous Home Health: [facility]  Previous Skilled Nursing Facility: denies  Previous Inpatient Rehab: denies  Insurance verified: Medicare A & B and BCBS supplemental  Pharmacy: [facility]   Emergency Contact: DaughterBre, [phone]    CM will follow patient progress and assist as needed with [facility]ONA plan.    02/20/25 0943   Service Assessment   Patient Orientation Alert and Oriented;Person;Place;Situation   Cognition Alert   History Provided By Patient   Primary Caregiver Self   Support Systems Spouse/Significant Other   Patient's Healthcare Decision Maker is: Legal Next of Kin   PCP Verified by CM Yes   Last Visit to PCP Within last 3 months   Prior Functional Level Independent in ADLs/IADLs   Current Functional Level Assistance with the following:;Mobility;Shopping;Housework;Cooking   Can patient return to prior living arrangement Yes   Ability to make needs known: Good   Family able to assist with home care needs: Yes   Would you like for me to discuss the discharge plan with any other family members/significant others, and if so, who? No   Financial Resources Medicare   Social/Functional History   Lives With Spouse   Type of Home House   Home Layout One level   Bathroom Equipment Grab bars in shower   Home Equipment

## 2025-02-20 NOTE — PLAN OF CARE
Problem: Pain  Goal: Verbalizes/displays adequate comfort level or baseline comfort level  Outcome: Progressing     Problem: Safety - Adult  Goal: Free from fall injury  Outcome: Progressing     Problem: Discharge Planning  Goal: Discharge to home or other facility with appropriate resources  Outcome: Progressing     Problem: Physical Therapy - Adult  Goal: By Discharge: Performs mobility at highest level of function for planned discharge setting.  See evaluation for individualized goals.  Description: FUNCTIONAL STATUS PRIOR TO ADMISSION: Patient was independent without use of DME.    HOME SUPPORT PRIOR TO ADMISSION: The patient lived with  and did not require assistance.    Physical Therapy Goals  Initiated 2/19/2025  1.  Patient will move from supine to sit and sit to supine and scoot up and down in bed with modified independence within 4 day(s).    2.  Patient will perform sit to stand with modified independence within 4 day(s).  3.  Patient will transfer from bed to chair and chair to bed with modified independence using the least restrictive device within 4 day(s).  4.  Patient will ambulate with modified independence for > 150 feet with the least restrictive device within 4 day(s).   5.  Patient will ascend/descend 4 stairs with one handrail(s) with supervision within 4 day(s).  6. Patient will perform TKR home exercise program per protocol with supervision/set-up within 4 days.  7. Patient will demonstrate AROM 0-90 degrees in operative joint within 4 days.     2/19/2025 1630 by Glenny Meneses, PT  Outcome: Progressing

## 2025-02-20 NOTE — PROGRESS NOTES
OCCUPATIONAL THERAPY EVALUATION/DISCHARGE  Patient: Cinthia Cadena (80 y.o. female)  Date: 2/20/2025  Primary Diagnosis: Arthritis of right knee [M17.11]  Localized osteoarthritis of right knee [M17.11]  Procedure(s) (LRB):  RIGHT UNICONDYLAR MEDIAL KNEE ARTHROPLASTY (Right) 1 Day Post-Op     Precautions: Weight Bearing Right Lower Extremity Weight Bearing: Weight Bearing As Tolerated                ASSESSMENT :  Pt is POD #1 R TKA. Based on the objective data below, the patient is able to complete BADLs Mod I-Spv. Pt demonstrated ability to complete dressing Mod I with good safety awareness. Pt reports good social support from her family and  if she were to need assistance at home. Pt reporting no further questions or concerns for completing self care tasks in home environment. Pt left seated in chair with all needs met. Pt is cleared for discharge home from an OT standpoint.    Functional Outcome Measure:  The patient scored 23/24 on the [facility] outcome measure which is indicative of lower odds of benefiting from rehab at discharge in a facility versus home.       Further skilled acute occupational therapy is not indicated at this time.     PLAN :  Recommend with staff: Up ad radha    Recommendation for discharge: (in order for the patient to meet his/her long term goals):   No skilled occupational therapy    Other factors to consider for discharge: no additional factors    IF patient discharges home will need the following DME: patient owns DME required for discharge     SUBJECTIVE:   Patient stated, “Last time I didn't need a reacher, I have it in the back of a closet somewhere.”    OBJECTIVE DATA SUMMARY:     Past Medical History:   Diagnosis Date    Arthritis     Cancer (HCC)     Skin    Chronic pain     LEFT KNEE AND BACK, NECK AND HEAD    Colitis     Degenerative cervical spinal stenosis     Degenerative lumbar spinal stenosis     GERD (gastroesophageal reflux disease)     Hyperlipemia     Hypertension

## 2025-05-06 DIAGNOSIS — M54.81 BILATERAL OCCIPITAL NEURALGIA: ICD-10-CM

## 2025-05-06 DIAGNOSIS — G44.86 CERVICOGENIC HEADACHE: ICD-10-CM

## 2025-05-06 RX ORDER — BUTALBITAL, ACETAMINOPHEN AND CAFFEINE 50; 325; 40 MG/1; MG/1; MG/1
2 TABLET ORAL EVERY 8 HOURS PRN
Qty: 20 TABLET | Refills: 0 | Status: SHIPPED | OUTPATIENT
Start: 2025-05-06

## 2025-05-07 NOTE — TELEPHONE ENCOUNTER
Order printed rather than e-scribe. Verbal order called in due to fax not working.     Verbal order given for   butalbital-acetaminophen-caffeine (FIORICET, ESGIC) -40 MG per tablet   To take 2 tablets by mouth every 8 hours as needed for headaches. Dispense 20 tablets with 0 refills.

## 2025-05-14 DIAGNOSIS — G43.719 CHRONIC MIGRAINE WITHOUT AURA, INTRACTABLE, WITHOUT STATUS MIGRAINOSUS: ICD-10-CM

## 2025-05-14 NOTE — TELEPHONE ENCOUNTER
Last office visit 09/10/2024  Next office visit 09/11/2025  Last med refill 02/20/2025    New PA needed

## 2025-05-15 RX ORDER — GALCANEZUMAB 120 MG/ML
120 INJECTION, SOLUTION SUBCUTANEOUS
Qty: 1 ADJUSTABLE DOSE PRE-FILLED PEN SYRINGE | Refills: 11 | Status: ACTIVE | OUTPATIENT
Start: 2025-05-15 | End: 2026-05-15

## (undated) DEVICE — APPLICATOR MEDICATED 3 CC SOLUTION HI LT ORNG CHLORAPREP 930415

## (undated) DEVICE — BRUSH SCRB DRY NL CLN LF GRN --

## (undated) DEVICE — 4-PORT MANIFOLD: Brand: NEPTUNE 2

## (undated) DEVICE — PADDING CAST W6INXL4YD ST COT RAYON MICROPLEATED HIGHLY

## (undated) DEVICE — LIQUIBAND RAPID ADHESIVE 36/CS 0.8ML: Brand: MEDLINE

## (undated) DEVICE — HYPODERMIC SAFETY NEEDLE: Brand: MAGELLAN

## (undated) DEVICE — SUTURE STRATAFIX SYMMETRIC PDS + SZ 1 L18IN ABSRB VLT L48MM SXPP1A400

## (undated) DEVICE — CONTAINER,SPECIMEN,3OZ,OR STRL: Brand: MEDLINE

## (undated) DEVICE — SCRUBIN SCRUB BRUSH DRY STER: Brand: MEDLINE INDUSTRIES, INC.

## (undated) DEVICE — TOWEL 4 PLY TISS 19X30 SUE WHT

## (undated) DEVICE — SYR 20ML LL STRL LF --

## (undated) DEVICE — Device

## (undated) DEVICE — SOLIDIFIER FLD 2OZ 1500CC N DISINF IN BTL DISP SAFESORB

## (undated) DEVICE — Device: Brand: JELCO

## (undated) DEVICE — GLOVE ORTHO 8   MSG9480

## (undated) DEVICE — REM POLYHESIVE ADULT PATIENT RETURN ELECTRODE: Brand: VALLEYLAB

## (undated) DEVICE — SPONGE GZ 4X4 IN 8 PLY STRL LF

## (undated) DEVICE — SOLUTION SURG PREP 26 CC PURPREP

## (undated) DEVICE — SUTURE VCRL 1 L27IN ABSRB CT BRAID COAT UD J281H

## (undated) DEVICE — DRESSING HYDROCOLLOID BORDER 35X10 IN ALUM PRIMASEAL

## (undated) DEVICE — SYRINGE 20ML LL S/C 50

## (undated) DEVICE — PADDING CAST W6INXL4YD NONSTERILE COT RAYON MICROPLEATED

## (undated) DEVICE — SYSTEM NAVIGATION PALM SZ PRECIS ALIGN TECHNOLOGY DISP FOR

## (undated) DEVICE — 1200 GUARD II KIT W/5MM TUBE W/O VAC TUBE: Brand: GUARDIAN

## (undated) DEVICE — HOOD, PEEL-AWAY: Brand: FLYTE

## (undated) DEVICE — SUTURE MCRYL SZ 3-0 L27IN ABSRB UD L19MM PS-2 3/8 CIR PRIM Y427H

## (undated) DEVICE — RECIPROCATING BLADE HEAVY DUTY LONG, OFFSET  (77.6 X 0.77 X 11.2MM)

## (undated) DEVICE — ELECTRODE PT RET AD L9FT HI MOIST COND ADH HYDRGEL CORDED

## (undated) DEVICE — TOTAL JOINT - SMH: Brand: MEDLINE INDUSTRIES, INC.

## (undated) DEVICE — TUBING SUCT 12FR MAL ALUM SHFT FN CAP VENT UNIV CONN W/ OBT

## (undated) DEVICE — PREP SKN CLORAPREP ORN STRL --

## (undated) DEVICE — DERMABOND SKIN ADH 0.7ML -- DERMABOND ADVANCED 12/BX

## (undated) DEVICE — CATH IV AUTOGRD BC PNK 20GA 25 -- INSYTE

## (undated) DEVICE — BANDAGE COMPR M W6INXL10YD WHT BGE VELC E MTRX HK AND LOOP

## (undated) DEVICE — NEONATAL-ADULT SPO2 SENSOR: Brand: NELLCOR

## (undated) DEVICE — DRESSING HYDROFIBER AQUACEL AG ADVANTAGE 3.5X10 IN

## (undated) DEVICE — SYR 3ML LL TIP 1/10ML GRAD --

## (undated) DEVICE — ELECTRODE,RADIOTRANSLUCENT,FOAM,5PK: Brand: MEDLINE

## (undated) DEVICE — SUTURE VCRL SZ 2-0 L36IN ABSRB UD L40MM CT 1/2 CIR J957H

## (undated) DEVICE — DRAPE SURG KNEE 1 MIN SET ESYSUIT

## (undated) DEVICE — BANDAGE COMPR W6INXL12FT SMOOTH FOR LIMB EXSANG ESMARCH

## (undated) DEVICE — BOWL BONE CEM MX DISP QUICK-VAC

## (undated) DEVICE — SUTURE VICRYL ABSORBABLE BRAIDED 2-0 CT 36 IN DA UD  VCP957H

## (undated) DEVICE — PADDING CAST SPEC 6INX4YD COT --

## (undated) DEVICE — Z DISCONTINUED PER MEDLINE LINE GAS SAMPLING O2/CO2 LNG AD 13 FT NSL W/ TBNG FILTERLINE

## (undated) DEVICE — SOLUTION IRRIG 3000ML 0.9% SOD CHL USP UROMATIC PLAS CONT

## (undated) DEVICE — CONTAINER SPEC 20 ML LID NEUT BUFF FORMALIN 10 % POLYPR STS

## (undated) DEVICE — SUTURE VICRYL 1 L27IN ABSRB CT BRAID COAT UD J281H

## (undated) DEVICE — BAG SPEC BIOHZRD 10 X 10 IN --

## (undated) DEVICE — SOL IRR SOD CHL 0.9% TITAN XL CNTNR 3000ML

## (undated) DEVICE — BASIN EMSIS 16OZ GRAPHITE PLAS KID SHP MOLD GRAD FOR ORAL

## (undated) DEVICE — 2108 SERIES SAGITTAL BLADE, NO OFFSET  (12.4 X 1.19 X 82.1MM)

## (undated) DEVICE — NEEDLE HYPO 18GA L1.5IN PNK S STL HUB POLYPR SHLD REG BVL

## (undated) DEVICE — DRAPE,EXTREMITY,89X128,STERILE: Brand: MEDLINE

## (undated) DEVICE — PADDING CST 6IN STERILE --

## (undated) DEVICE — SUTURE MONOCRYL SZ 3-0 L27IN ABSRB UD PS-2 3/8 CIR REV CUT NDL MCP427H

## (undated) DEVICE — SET ADMIN 16ML TBNG L100IN 2 Y INJ SITE IV PIGGY BK DISP

## (undated) DEVICE — FORCEPS BX L240CM JAW DIA2.8MM L CAP W/ NDL MIC MESH TOOTH

## (undated) DEVICE — SYR 10ML LUER LOK 1/5ML GRAD --